# Patient Record
Sex: FEMALE | Race: ASIAN | NOT HISPANIC OR LATINO | Employment: OTHER | URBAN - METROPOLITAN AREA
[De-identification: names, ages, dates, MRNs, and addresses within clinical notes are randomized per-mention and may not be internally consistent; named-entity substitution may affect disease eponyms.]

---

## 2017-05-11 ENCOUNTER — HOME CARE/HOSPICE - HEALTHEAST (OUTPATIENT)
Dept: HOME HEALTH SERVICES | Facility: HOME HEALTH | Age: 72
End: 2017-05-11

## 2017-05-31 ENCOUNTER — RECORDS - HEALTHEAST (OUTPATIENT)
Dept: ADMINISTRATIVE | Facility: OTHER | Age: 72
End: 2017-05-31

## 2019-03-24 ENCOUNTER — RECORDS - HEALTHEAST (OUTPATIENT)
Dept: ADMINISTRATIVE | Facility: OTHER | Age: 74
End: 2019-03-24

## 2019-03-24 ASSESSMENT — MIFFLIN-ST. JEOR
SCORE: 1384.03
SCORE: 1384.03

## 2019-03-25 ENCOUNTER — RECORDS - HEALTHEAST (OUTPATIENT)
Dept: ADMINISTRATIVE | Facility: OTHER | Age: 74
End: 2019-03-25

## 2019-03-25 ENCOUNTER — AMBULATORY - HEALTHEAST (OUTPATIENT)
Dept: OTHER | Facility: CLINIC | Age: 74
End: 2019-03-25

## 2019-03-26 ENCOUNTER — AMBULATORY - HEALTHEAST (OUTPATIENT)
Dept: OTHER | Facility: CLINIC | Age: 74
End: 2019-03-26

## 2019-03-26 ENCOUNTER — ANESTHESIA - HEALTHEAST (OUTPATIENT)
Dept: SURGERY | Facility: HOSPITAL | Age: 74
End: 2019-03-26

## 2019-03-27 ENCOUNTER — OFFICE VISIT - HEALTHEAST (OUTPATIENT)
Dept: INTERPRETER SERVICES | Facility: CLINIC | Age: 74
End: 2019-03-27

## 2019-03-27 ENCOUNTER — SURGERY - HEALTHEAST (OUTPATIENT)
Dept: SURGERY | Facility: HOSPITAL | Age: 74
End: 2019-03-27

## 2019-03-29 ENCOUNTER — ANESTHESIA - HEALTHEAST (OUTPATIENT)
Dept: SURGERY | Facility: HOSPITAL | Age: 74
End: 2019-03-29

## 2019-03-29 ENCOUNTER — SURGERY - HEALTHEAST (OUTPATIENT)
Dept: SURGERY | Facility: HOSPITAL | Age: 74
End: 2019-03-29

## 2019-04-01 ENCOUNTER — COMMUNICATION - HEALTHEAST (OUTPATIENT)
Dept: NEUROLOGY | Facility: CLINIC | Age: 74
End: 2019-04-01

## 2019-04-01 ENCOUNTER — AMBULATORY - HEALTHEAST (OUTPATIENT)
Dept: OTHER | Facility: CLINIC | Age: 74
End: 2019-04-01

## 2019-04-02 ENCOUNTER — COMMUNICATION - HEALTHEAST (OUTPATIENT)
Dept: NEUROSURGERY | Facility: CLINIC | Age: 74
End: 2019-04-02

## 2019-04-02 DIAGNOSIS — S32.010A COMPRESSION FRACTURE OF L1 LUMBAR VERTEBRA (H): ICD-10-CM

## 2019-04-03 ENCOUNTER — COMMUNICATION - HEALTHEAST (OUTPATIENT)
Dept: NEUROSURGERY | Facility: CLINIC | Age: 74
End: 2019-04-03

## 2019-04-03 ENCOUNTER — AMBULATORY - HEALTHEAST (OUTPATIENT)
Dept: OTHER | Facility: CLINIC | Age: 74
End: 2019-04-03

## 2019-04-03 ENCOUNTER — OFFICE VISIT - HEALTHEAST (OUTPATIENT)
Dept: GERIATRICS | Facility: CLINIC | Age: 74
End: 2019-04-03

## 2019-04-03 DIAGNOSIS — Z98.890 S/P LAMINECTOMY: ICD-10-CM

## 2019-04-03 DIAGNOSIS — Z79.4 TYPE 2 DIABETES MELLITUS WITH HYPERGLYCEMIA, WITH LONG-TERM CURRENT USE OF INSULIN (H): ICD-10-CM

## 2019-04-03 DIAGNOSIS — S32.030S CLOSED COMPRESSION FRACTURE OF THIRD LUMBAR VERTEBRA, SEQUELA: ICD-10-CM

## 2019-04-03 DIAGNOSIS — R52 PAIN MANAGEMENT: ICD-10-CM

## 2019-04-03 DIAGNOSIS — E11.65 TYPE 2 DIABETES MELLITUS WITH HYPERGLYCEMIA, WITH LONG-TERM CURRENT USE OF INSULIN (H): ICD-10-CM

## 2019-04-04 ENCOUNTER — RECORDS - HEALTHEAST (OUTPATIENT)
Dept: LAB | Facility: CLINIC | Age: 74
End: 2019-04-04

## 2019-04-04 LAB
ALBUMIN UR-MCNC: ABNORMAL MG/DL
APPEARANCE UR: ABNORMAL
BACTERIA #/AREA URNS HPF: ABNORMAL HPF
BILIRUB UR QL STRIP: NEGATIVE
COLOR UR AUTO: YELLOW
GLUCOSE UR STRIP-MCNC: NEGATIVE MG/DL
HGB UR QL STRIP: ABNORMAL
KETONES UR STRIP-MCNC: NEGATIVE MG/DL
LEUKOCYTE ESTERASE UR QL STRIP: ABNORMAL
MUCOUS THREADS #/AREA URNS LPF: ABNORMAL LPF
NITRATE UR QL: POSITIVE
PH UR STRIP: 6.5 [PH] (ref 4.5–8)
RBC #/AREA URNS AUTO: ABNORMAL HPF
SP GR UR STRIP: 1.02 (ref 1–1.03)
SQUAMOUS #/AREA URNS AUTO: ABNORMAL LPF
UROBILINOGEN UR STRIP-ACNC: ABNORMAL
WBC #/AREA URNS AUTO: >100 HPF

## 2019-04-05 ENCOUNTER — OFFICE VISIT - HEALTHEAST (OUTPATIENT)
Dept: GERIATRICS | Facility: CLINIC | Age: 74
End: 2019-04-05

## 2019-04-05 DIAGNOSIS — Z79.4 TYPE 2 DIABETES MELLITUS WITH HYPERGLYCEMIA, WITH LONG-TERM CURRENT USE OF INSULIN (H): ICD-10-CM

## 2019-04-05 DIAGNOSIS — E11.65 TYPE 2 DIABETES MELLITUS WITH HYPERGLYCEMIA, WITH LONG-TERM CURRENT USE OF INSULIN (H): ICD-10-CM

## 2019-04-05 DIAGNOSIS — R52 PAIN MANAGEMENT: ICD-10-CM

## 2019-04-05 DIAGNOSIS — Z98.890 S/P LAMINECTOMY: ICD-10-CM

## 2019-04-05 DIAGNOSIS — S32.030S CLOSED COMPRESSION FRACTURE OF THIRD LUMBAR VERTEBRA, SEQUELA: ICD-10-CM

## 2019-04-05 DIAGNOSIS — N30.00 ACUTE CYSTITIS WITHOUT HEMATURIA: ICD-10-CM

## 2019-04-05 DIAGNOSIS — D64.9 ANEMIA, UNSPECIFIED TYPE: ICD-10-CM

## 2019-04-05 LAB — HGB BLD-MCNC: 8.8 G/DL (ref 12–16)

## 2019-04-06 LAB
BACTERIA SPEC CULT: ABNORMAL
BACTERIA SPEC CULT: ABNORMAL

## 2019-04-08 ENCOUNTER — OFFICE VISIT - HEALTHEAST (OUTPATIENT)
Dept: GERIATRICS | Facility: CLINIC | Age: 74
End: 2019-04-08

## 2019-04-08 DIAGNOSIS — N30.00 ACUTE CYSTITIS WITHOUT HEMATURIA: ICD-10-CM

## 2019-04-08 DIAGNOSIS — Z79.4 TYPE 2 DIABETES MELLITUS WITH HYPERGLYCEMIA, WITH LONG-TERM CURRENT USE OF INSULIN (H): ICD-10-CM

## 2019-04-08 DIAGNOSIS — E11.65 TYPE 2 DIABETES MELLITUS WITH HYPERGLYCEMIA, WITH LONG-TERM CURRENT USE OF INSULIN (H): ICD-10-CM

## 2019-04-08 DIAGNOSIS — Z98.890 S/P LAMINECTOMY: ICD-10-CM

## 2019-04-08 DIAGNOSIS — D62 ACUTE BLOOD LOSS ANEMIA: ICD-10-CM

## 2019-04-08 DIAGNOSIS — M80.00XD AGE-RELATED OSTEOPOROSIS WITH CURRENT PATHOLOGICAL FRACTURE WITH ROUTINE HEALING, SUBSEQUENT ENCOUNTER: ICD-10-CM

## 2019-04-09 ENCOUNTER — RECORDS - HEALTHEAST (OUTPATIENT)
Dept: LAB | Facility: CLINIC | Age: 74
End: 2019-04-09

## 2019-04-09 LAB
BASOPHILS # BLD AUTO: 0 THOU/UL (ref 0–0.2)
BASOPHILS NFR BLD AUTO: 0 % (ref 0–2)
EOSINOPHIL # BLD AUTO: 0.1 THOU/UL (ref 0–0.4)
EOSINOPHIL NFR BLD AUTO: 1 % (ref 0–6)
ERYTHROCYTE [DISTWIDTH] IN BLOOD BY AUTOMATED COUNT: 14.1 % (ref 11–14.5)
FERRITIN SERPL-MCNC: 267 NG/ML (ref 10–130)
HCT VFR BLD AUTO: 33.3 % (ref 35–47)
HGB BLD-MCNC: 10.7 G/DL (ref 12–16)
IRON SERPL-MCNC: 47 UG/DL (ref 42–175)
LYMPHOCYTES # BLD AUTO: 1.9 THOU/UL (ref 0.8–4.4)
LYMPHOCYTES NFR BLD AUTO: 26 % (ref 20–40)
MCH RBC QN AUTO: 31.7 PG (ref 27–34)
MCHC RBC AUTO-ENTMCNC: 32.1 G/DL (ref 32–36)
MCV RBC AUTO: 99 FL (ref 80–100)
MONOCYTES # BLD AUTO: 0.5 THOU/UL (ref 0–0.9)
MONOCYTES NFR BLD AUTO: 7 % (ref 2–10)
NEUTROPHILS # BLD AUTO: 4.8 THOU/UL (ref 2–7.7)
NEUTROPHILS NFR BLD AUTO: 66 % (ref 50–70)
PLATELET # BLD AUTO: 333 THOU/UL (ref 140–440)
PMV BLD AUTO: 8.7 FL (ref 8.5–12.5)
RBC # BLD AUTO: 3.38 MILL/UL (ref 3.8–5.4)
TSH SERPL DL<=0.005 MIU/L-ACNC: 0.26 UIU/ML (ref 0.3–5)
WBC: 7.3 THOU/UL (ref 4–11)

## 2019-04-10 ENCOUNTER — OFFICE VISIT - HEALTHEAST (OUTPATIENT)
Dept: GERIATRICS | Facility: CLINIC | Age: 74
End: 2019-04-10

## 2019-04-10 DIAGNOSIS — S32.030S CLOSED COMPRESSION FRACTURE OF THIRD LUMBAR VERTEBRA, SEQUELA: ICD-10-CM

## 2019-04-10 DIAGNOSIS — Z79.4 TYPE 2 DIABETES MELLITUS WITH HYPERGLYCEMIA, WITH LONG-TERM CURRENT USE OF INSULIN (H): ICD-10-CM

## 2019-04-10 DIAGNOSIS — Z98.890 S/P LAMINECTOMY: ICD-10-CM

## 2019-04-10 DIAGNOSIS — N30.00 ACUTE CYSTITIS WITHOUT HEMATURIA: ICD-10-CM

## 2019-04-10 DIAGNOSIS — R52 PAIN MANAGEMENT: ICD-10-CM

## 2019-04-10 DIAGNOSIS — E11.65 TYPE 2 DIABETES MELLITUS WITH HYPERGLYCEMIA, WITH LONG-TERM CURRENT USE OF INSULIN (H): ICD-10-CM

## 2019-04-12 ENCOUNTER — OFFICE VISIT - HEALTHEAST (OUTPATIENT)
Dept: GERIATRICS | Facility: CLINIC | Age: 74
End: 2019-04-12

## 2019-04-12 DIAGNOSIS — S32.030S CLOSED COMPRESSION FRACTURE OF THIRD LUMBAR VERTEBRA, SEQUELA: ICD-10-CM

## 2019-04-12 DIAGNOSIS — Z79.4 TYPE 2 DIABETES MELLITUS WITH HYPERGLYCEMIA, WITH LONG-TERM CURRENT USE OF INSULIN (H): ICD-10-CM

## 2019-04-12 DIAGNOSIS — M21.371 BILATERAL FOOT-DROP: ICD-10-CM

## 2019-04-12 DIAGNOSIS — Z98.890 S/P LAMINECTOMY: ICD-10-CM

## 2019-04-12 DIAGNOSIS — R52 PAIN MANAGEMENT: ICD-10-CM

## 2019-04-12 DIAGNOSIS — M21.372 BILATERAL FOOT-DROP: ICD-10-CM

## 2019-04-12 DIAGNOSIS — E11.65 TYPE 2 DIABETES MELLITUS WITH HYPERGLYCEMIA, WITH LONG-TERM CURRENT USE OF INSULIN (H): ICD-10-CM

## 2019-04-15 ENCOUNTER — AMBULATORY - HEALTHEAST (OUTPATIENT)
Dept: NEUROSURGERY | Facility: CLINIC | Age: 74
End: 2019-04-15

## 2019-04-16 ENCOUNTER — OFFICE VISIT - HEALTHEAST (OUTPATIENT)
Dept: GERIATRICS | Facility: CLINIC | Age: 74
End: 2019-04-16

## 2019-04-16 DIAGNOSIS — S32.030S CLOSED COMPRESSION FRACTURE OF THIRD LUMBAR VERTEBRA, SEQUELA: ICD-10-CM

## 2019-04-16 DIAGNOSIS — Z79.4 TYPE 2 DIABETES MELLITUS WITH HYPERGLYCEMIA, WITH LONG-TERM CURRENT USE OF INSULIN (H): ICD-10-CM

## 2019-04-16 DIAGNOSIS — E11.65 TYPE 2 DIABETES MELLITUS WITH HYPERGLYCEMIA, WITH LONG-TERM CURRENT USE OF INSULIN (H): ICD-10-CM

## 2019-04-16 DIAGNOSIS — R52 PAIN MANAGEMENT: ICD-10-CM

## 2019-04-16 DIAGNOSIS — Z98.890 S/P LAMINECTOMY: ICD-10-CM

## 2019-04-17 ENCOUNTER — RECORDS - HEALTHEAST (OUTPATIENT)
Dept: LAB | Facility: CLINIC | Age: 74
End: 2019-04-17

## 2019-04-17 LAB
25(OH)D3 SERPL-MCNC: 23.4 NG/ML (ref 30–80)
T4 FREE SERPL-MCNC: 1.3 NG/DL (ref 0.7–1.8)
TSH SERPL DL<=0.005 MIU/L-ACNC: 0.44 UIU/ML (ref 0.3–5)

## 2019-04-19 ENCOUNTER — OFFICE VISIT - HEALTHEAST (OUTPATIENT)
Dept: NEUROSURGERY | Facility: CLINIC | Age: 74
End: 2019-04-19

## 2019-04-19 ENCOUNTER — HOSPITAL ENCOUNTER (OUTPATIENT)
Dept: RADIOLOGY | Facility: CLINIC | Age: 74
Discharge: HOME | End: 2019-04-19
Attending: SURGERY

## 2019-04-19 ENCOUNTER — AMBULATORY - HEALTHEAST (OUTPATIENT)
Dept: NEUROSURGERY | Facility: CLINIC | Age: 74
End: 2019-04-19

## 2019-04-19 DIAGNOSIS — S32.010G CLOSED COMPRESSION FRACTURE OF L1 LUMBAR VERTEBRA WITH DELAYED HEALING, SUBSEQUENT ENCOUNTER: ICD-10-CM

## 2019-04-19 DIAGNOSIS — S32.010A COMPRESSION FRACTURE OF L1 LUMBAR VERTEBRA (H): ICD-10-CM

## 2019-04-19 ASSESSMENT — MIFFLIN-ST. JEOR: SCORE: 1346.83

## 2019-04-22 ENCOUNTER — OFFICE VISIT - HEALTHEAST (OUTPATIENT)
Dept: GERIATRICS | Facility: CLINIC | Age: 74
End: 2019-04-22

## 2019-04-22 DIAGNOSIS — Z98.890 S/P LAMINECTOMY: ICD-10-CM

## 2019-04-22 DIAGNOSIS — E55.9 VITAMIN D DEFICIENCY: ICD-10-CM

## 2019-04-22 DIAGNOSIS — R52 PAIN MANAGEMENT: ICD-10-CM

## 2019-04-22 DIAGNOSIS — S32.030S CLOSED COMPRESSION FRACTURE OF THIRD LUMBAR VERTEBRA, SEQUELA: ICD-10-CM

## 2019-04-22 DIAGNOSIS — E11.65 TYPE 2 DIABETES MELLITUS WITH HYPERGLYCEMIA, WITH LONG-TERM CURRENT USE OF INSULIN (H): ICD-10-CM

## 2019-04-22 DIAGNOSIS — Z79.4 TYPE 2 DIABETES MELLITUS WITH HYPERGLYCEMIA, WITH LONG-TERM CURRENT USE OF INSULIN (H): ICD-10-CM

## 2019-04-25 ENCOUNTER — OFFICE VISIT - HEALTHEAST (OUTPATIENT)
Dept: GERIATRICS | Facility: CLINIC | Age: 74
End: 2019-04-25

## 2019-04-25 DIAGNOSIS — E11.65 TYPE 2 DIABETES MELLITUS WITH HYPERGLYCEMIA, WITH LONG-TERM CURRENT USE OF INSULIN (H): ICD-10-CM

## 2019-04-25 DIAGNOSIS — S32.030S CLOSED COMPRESSION FRACTURE OF THIRD LUMBAR VERTEBRA, SEQUELA: ICD-10-CM

## 2019-04-25 DIAGNOSIS — Z98.890 S/P LAMINECTOMY: ICD-10-CM

## 2019-04-25 DIAGNOSIS — Z79.4 TYPE 2 DIABETES MELLITUS WITH HYPERGLYCEMIA, WITH LONG-TERM CURRENT USE OF INSULIN (H): ICD-10-CM

## 2019-04-25 DIAGNOSIS — R52 PAIN MANAGEMENT: ICD-10-CM

## 2019-04-25 RX ORDER — ACETAMINOPHEN 325 MG/1
650 TABLET ORAL EVERY 6 HOURS PRN
Status: SHIPPED | COMMUNITY
Start: 2019-04-25 | End: 2023-08-06

## 2019-04-29 ENCOUNTER — RECORDS - HEALTHEAST (OUTPATIENT)
Dept: LAB | Facility: CLINIC | Age: 74
End: 2019-04-29

## 2019-04-29 LAB
ALBUMIN UR-MCNC: ABNORMAL MG/DL
APPEARANCE UR: ABNORMAL
BACTERIA #/AREA URNS HPF: ABNORMAL HPF
BILIRUB UR QL STRIP: NEGATIVE
COLOR UR AUTO: YELLOW
GLUCOSE UR STRIP-MCNC: NEGATIVE MG/DL
HGB UR QL STRIP: NEGATIVE
KETONES UR STRIP-MCNC: NEGATIVE MG/DL
LEUKOCYTE ESTERASE UR QL STRIP: ABNORMAL
MUCOUS THREADS #/AREA URNS LPF: ABNORMAL LPF
NITRATE UR QL: POSITIVE
PH UR STRIP: 6.5 [PH] (ref 4.5–8)
RBC #/AREA URNS AUTO: ABNORMAL HPF
SP GR UR STRIP: 1.02 (ref 1–1.03)
SQUAMOUS #/AREA URNS AUTO: ABNORMAL LPF
UROBILINOGEN UR STRIP-ACNC: ABNORMAL
WBC #/AREA URNS AUTO: ABNORMAL HPF

## 2019-04-30 ENCOUNTER — OFFICE VISIT - HEALTHEAST (OUTPATIENT)
Dept: GERIATRICS | Facility: CLINIC | Age: 74
End: 2019-04-30

## 2019-04-30 DIAGNOSIS — Z79.4 TYPE 2 DIABETES MELLITUS WITH HYPERGLYCEMIA, WITH LONG-TERM CURRENT USE OF INSULIN (H): ICD-10-CM

## 2019-04-30 DIAGNOSIS — N30.00 ACUTE CYSTITIS WITHOUT HEMATURIA: ICD-10-CM

## 2019-04-30 DIAGNOSIS — Z98.890 S/P LAMINECTOMY: ICD-10-CM

## 2019-04-30 DIAGNOSIS — E11.65 TYPE 2 DIABETES MELLITUS WITH HYPERGLYCEMIA, WITH LONG-TERM CURRENT USE OF INSULIN (H): ICD-10-CM

## 2019-04-30 DIAGNOSIS — R52 PAIN MANAGEMENT: ICD-10-CM

## 2019-04-30 DIAGNOSIS — S32.030S CLOSED COMPRESSION FRACTURE OF THIRD LUMBAR VERTEBRA, SEQUELA: ICD-10-CM

## 2019-05-02 ENCOUNTER — OFFICE VISIT - HEALTHEAST (OUTPATIENT)
Dept: GERIATRICS | Facility: CLINIC | Age: 74
End: 2019-05-02

## 2019-05-02 DIAGNOSIS — S32.030S CLOSED COMPRESSION FRACTURE OF THIRD LUMBAR VERTEBRA, SEQUELA: ICD-10-CM

## 2019-05-02 DIAGNOSIS — Z98.890 S/P LAMINECTOMY: ICD-10-CM

## 2019-05-02 DIAGNOSIS — N30.00 ACUTE CYSTITIS WITHOUT HEMATURIA: ICD-10-CM

## 2019-05-02 DIAGNOSIS — R52 PAIN MANAGEMENT: ICD-10-CM

## 2019-05-02 LAB
BACTERIA SPEC CULT: ABNORMAL
BACTERIA SPEC CULT: ABNORMAL

## 2019-05-06 ENCOUNTER — OFFICE VISIT - HEALTHEAST (OUTPATIENT)
Dept: GERIATRICS | Facility: CLINIC | Age: 74
End: 2019-05-06

## 2019-05-06 DIAGNOSIS — Z98.890 S/P LAMINECTOMY: ICD-10-CM

## 2019-05-06 DIAGNOSIS — S32.030S CLOSED COMPRESSION FRACTURE OF THIRD LUMBAR VERTEBRA, SEQUELA: ICD-10-CM

## 2019-05-06 DIAGNOSIS — N30.00 ACUTE CYSTITIS WITHOUT HEMATURIA: ICD-10-CM

## 2019-05-06 DIAGNOSIS — Z79.4 TYPE 2 DIABETES MELLITUS WITH HYPERGLYCEMIA, WITH LONG-TERM CURRENT USE OF INSULIN (H): ICD-10-CM

## 2019-05-06 DIAGNOSIS — E11.65 TYPE 2 DIABETES MELLITUS WITH HYPERGLYCEMIA, WITH LONG-TERM CURRENT USE OF INSULIN (H): ICD-10-CM

## 2019-05-09 ENCOUNTER — OFFICE VISIT - HEALTHEAST (OUTPATIENT)
Dept: GERIATRICS | Facility: CLINIC | Age: 74
End: 2019-05-09

## 2019-05-09 DIAGNOSIS — S32.030S CLOSED COMPRESSION FRACTURE OF THIRD LUMBAR VERTEBRA, SEQUELA: ICD-10-CM

## 2019-05-09 DIAGNOSIS — R29.6 FALLS FREQUENTLY: ICD-10-CM

## 2019-05-09 DIAGNOSIS — M21.371 BILATERAL FOOT-DROP: ICD-10-CM

## 2019-05-09 DIAGNOSIS — E11.65 TYPE 2 DIABETES MELLITUS WITH HYPERGLYCEMIA, WITH LONG-TERM CURRENT USE OF INSULIN (H): ICD-10-CM

## 2019-05-09 DIAGNOSIS — Z79.4 TYPE 2 DIABETES MELLITUS WITH HYPERGLYCEMIA, WITH LONG-TERM CURRENT USE OF INSULIN (H): ICD-10-CM

## 2019-05-09 DIAGNOSIS — Z98.890 S/P LAMINECTOMY: ICD-10-CM

## 2019-05-09 DIAGNOSIS — M21.372 BILATERAL FOOT-DROP: ICD-10-CM

## 2019-05-13 ENCOUNTER — OFFICE VISIT - HEALTHEAST (OUTPATIENT)
Dept: NEUROSURGERY | Facility: CLINIC | Age: 74
End: 2019-05-13

## 2019-05-13 ENCOUNTER — HOSPITAL ENCOUNTER (OUTPATIENT)
Dept: RADIOLOGY | Facility: CLINIC | Age: 74
Discharge: HOME | End: 2019-05-13

## 2019-05-13 DIAGNOSIS — S32.010G CLOSED COMPRESSION FRACTURE OF L1 LUMBAR VERTEBRA WITH DELAYED HEALING, SUBSEQUENT ENCOUNTER: ICD-10-CM

## 2019-05-13 RX ORDER — AMOXICILLIN 250 MG
1 CAPSULE ORAL DAILY
Status: SHIPPED | COMMUNITY
Start: 2019-05-13 | End: 2023-08-06

## 2019-05-13 RX ORDER — POLYETHYLENE GLYCOL 3350 17 G/17G
17 POWDER, FOR SOLUTION ORAL DAILY
Status: SHIPPED | COMMUNITY
Start: 2019-05-13 | End: 2023-08-06

## 2019-05-13 ASSESSMENT — MIFFLIN-ST. JEOR: SCORE: 1346.83

## 2019-05-14 ENCOUNTER — OFFICE VISIT - HEALTHEAST (OUTPATIENT)
Dept: GERIATRICS | Facility: CLINIC | Age: 74
End: 2019-05-14

## 2019-05-14 DIAGNOSIS — M80.00XD AGE-RELATED OSTEOPOROSIS WITH CURRENT PATHOLOGICAL FRACTURE WITH ROUTINE HEALING, SUBSEQUENT ENCOUNTER: ICD-10-CM

## 2019-05-14 DIAGNOSIS — Z98.890 S/P LAMINECTOMY: ICD-10-CM

## 2019-05-14 DIAGNOSIS — S32.030S CLOSED COMPRESSION FRACTURE OF THIRD LUMBAR VERTEBRA, SEQUELA: ICD-10-CM

## 2019-05-14 DIAGNOSIS — E11.65 TYPE 2 DIABETES MELLITUS WITH HYPERGLYCEMIA, WITH LONG-TERM CURRENT USE OF INSULIN (H): ICD-10-CM

## 2019-05-14 DIAGNOSIS — Z79.4 TYPE 2 DIABETES MELLITUS WITH HYPERGLYCEMIA, WITH LONG-TERM CURRENT USE OF INSULIN (H): ICD-10-CM

## 2019-05-14 DIAGNOSIS — M21.372 BILATERAL FOOT-DROP: ICD-10-CM

## 2019-05-14 DIAGNOSIS — M21.371 BILATERAL FOOT-DROP: ICD-10-CM

## 2019-05-14 RX ORDER — ALENDRONATE SODIUM 10 MG/1
70 TABLET ORAL WEEKLY
Status: SHIPPED | COMMUNITY
Start: 2019-05-14 | End: 2023-08-06

## 2019-05-16 ENCOUNTER — RECORDS - HEALTHEAST (OUTPATIENT)
Dept: LAB | Facility: CLINIC | Age: 74
End: 2019-05-16

## 2019-05-16 LAB — 25(OH)D3 SERPL-MCNC: 34.4 NG/ML (ref 30–80)

## 2019-05-20 ENCOUNTER — OFFICE VISIT - HEALTHEAST (OUTPATIENT)
Dept: GERIATRICS | Facility: CLINIC | Age: 74
End: 2019-05-20

## 2019-05-20 DIAGNOSIS — Z98.890 S/P LAMINECTOMY: ICD-10-CM

## 2019-05-20 DIAGNOSIS — M80.00XD AGE-RELATED OSTEOPOROSIS WITH CURRENT PATHOLOGICAL FRACTURE WITH ROUTINE HEALING, SUBSEQUENT ENCOUNTER: ICD-10-CM

## 2019-05-20 DIAGNOSIS — E11.65 TYPE 2 DIABETES MELLITUS WITH HYPERGLYCEMIA, WITH LONG-TERM CURRENT USE OF INSULIN (H): ICD-10-CM

## 2019-05-20 DIAGNOSIS — S32.030S CLOSED COMPRESSION FRACTURE OF THIRD LUMBAR VERTEBRA, SEQUELA: ICD-10-CM

## 2019-05-20 DIAGNOSIS — E55.9 VITAMIN D DEFICIENCY: ICD-10-CM

## 2019-05-20 DIAGNOSIS — Z79.4 TYPE 2 DIABETES MELLITUS WITH HYPERGLYCEMIA, WITH LONG-TERM CURRENT USE OF INSULIN (H): ICD-10-CM

## 2019-05-20 RX ORDER — CALCIUM CARBONATE 500 MG/1
2 TABLET, CHEWABLE ORAL DAILY
Status: SHIPPED | COMMUNITY
Start: 2019-05-20 | End: 2023-08-06

## 2019-05-20 RX ORDER — INSULIN GLARGINE 100 [IU]/ML
34 INJECTION, SOLUTION SUBCUTANEOUS AT BEDTIME
Status: SHIPPED | COMMUNITY
Start: 2019-05-20 | End: 2023-08-06

## 2019-05-28 ENCOUNTER — OFFICE VISIT - HEALTHEAST (OUTPATIENT)
Dept: GERIATRICS | Facility: CLINIC | Age: 74
End: 2019-05-28

## 2019-05-28 DIAGNOSIS — R63.4 WEIGHT LOSS: ICD-10-CM

## 2019-05-28 DIAGNOSIS — M80.00XD AGE-RELATED OSTEOPOROSIS WITH CURRENT PATHOLOGICAL FRACTURE WITH ROUTINE HEALING, SUBSEQUENT ENCOUNTER: ICD-10-CM

## 2019-05-28 DIAGNOSIS — Z79.4 TYPE 2 DIABETES MELLITUS WITH HYPERGLYCEMIA, WITH LONG-TERM CURRENT USE OF INSULIN (H): ICD-10-CM

## 2019-05-28 DIAGNOSIS — Z98.890 S/P LAMINECTOMY: ICD-10-CM

## 2019-05-28 DIAGNOSIS — E11.65 TYPE 2 DIABETES MELLITUS WITH HYPERGLYCEMIA, WITH LONG-TERM CURRENT USE OF INSULIN (H): ICD-10-CM

## 2019-05-28 DIAGNOSIS — S32.030S CLOSED COMPRESSION FRACTURE OF THIRD LUMBAR VERTEBRA, SEQUELA: ICD-10-CM

## 2019-06-03 ENCOUNTER — OFFICE VISIT - HEALTHEAST (OUTPATIENT)
Dept: GERIATRICS | Facility: CLINIC | Age: 74
End: 2019-06-03

## 2019-06-03 DIAGNOSIS — S32.030S CLOSED COMPRESSION FRACTURE OF THIRD LUMBAR VERTEBRA, SEQUELA: ICD-10-CM

## 2019-06-03 DIAGNOSIS — M80.00XD AGE-RELATED OSTEOPOROSIS WITH CURRENT PATHOLOGICAL FRACTURE WITH ROUTINE HEALING, SUBSEQUENT ENCOUNTER: ICD-10-CM

## 2019-06-03 DIAGNOSIS — Z98.890 S/P LAMINECTOMY: ICD-10-CM

## 2019-06-03 DIAGNOSIS — Z79.4 TYPE 2 DIABETES MELLITUS WITH HYPERGLYCEMIA, WITH LONG-TERM CURRENT USE OF INSULIN (H): ICD-10-CM

## 2019-06-03 DIAGNOSIS — M21.372 BILATERAL FOOT-DROP: ICD-10-CM

## 2019-06-03 DIAGNOSIS — E55.9 VITAMIN D DEFICIENCY: ICD-10-CM

## 2019-06-03 DIAGNOSIS — R29.6 FALLS FREQUENTLY: ICD-10-CM

## 2019-06-03 DIAGNOSIS — E11.65 TYPE 2 DIABETES MELLITUS WITH HYPERGLYCEMIA, WITH LONG-TERM CURRENT USE OF INSULIN (H): ICD-10-CM

## 2019-06-03 DIAGNOSIS — M21.371 BILATERAL FOOT-DROP: ICD-10-CM

## 2019-06-06 ENCOUNTER — AMBULATORY - HEALTHEAST (OUTPATIENT)
Dept: GERIATRICS | Facility: CLINIC | Age: 74
End: 2019-06-06

## 2019-06-20 ENCOUNTER — COMMUNICATION - HEALTHEAST (OUTPATIENT)
Dept: NEUROSURGERY | Facility: CLINIC | Age: 74
End: 2019-06-20

## 2019-06-20 ENCOUNTER — HOSPITAL ENCOUNTER (OUTPATIENT)
Dept: RADIOLOGY | Facility: HOSPITAL | Age: 74
Discharge: HOME | End: 2019-06-20

## 2019-06-20 DIAGNOSIS — S32.010G CLOSED COMPRESSION FRACTURE OF L1 LUMBAR VERTEBRA WITH DELAYED HEALING, SUBSEQUENT ENCOUNTER: ICD-10-CM

## 2019-07-01 ENCOUNTER — OFFICE VISIT - HEALTHEAST (OUTPATIENT)
Dept: NEUROSURGERY | Facility: CLINIC | Age: 74
End: 2019-07-01

## 2019-07-01 DIAGNOSIS — S32.010G CLOSED COMPRESSION FRACTURE OF L1 LUMBAR VERTEBRA WITH DELAYED HEALING, SUBSEQUENT ENCOUNTER: ICD-10-CM

## 2019-07-01 ASSESSMENT — MIFFLIN-ST. JEOR: SCORE: 1267.46

## 2021-05-27 NOTE — ANESTHESIA POSTPROCEDURE EVALUATION
Patient: Trinidad Brown  Lumbar 3- Lumbar-4, Lumbar 4-Lumbar -5, Lumbar 5-Sacral 1,  decompressive laminectomy.  Anesthesia type: general    Patient location: PACU  Last vitals:   Vitals:    03/29/19 2230   BP: 117/59   Pulse: 93   Resp: 18   Temp: 36.4  C (97.6  F)   SpO2: 98%   Note is a late entry owing to clinical demands  Post vital signs: stable  Level of consciousness: awake and responds to questions  Post-anesthesia pain: pain controlled  Post-anesthesia nausea and vomiting: no  Pulmonary: supplemental oxygen  Cardiovascular: stable and blood pressure at baseline  Hydration: adequate  Anesthetic events: no    QCDR Measures:  ASA# 11 - Jenna-op Cardiac Arrest: ASA11B - Patient did NOT experience unanticipated cardiac arrest  ASA# 12 - Jenna-op Mortality Rate: ASA12B - Patient did NOT die  ASA# 13 - PACU Re-Intubation Rate: ASA13B - Patient did NOT require a new airway mgmt  ASA# 10 - Composite Anes Safety: ASA10A - No serious adverse event    Additional Notes:

## 2021-05-27 NOTE — TELEPHONE ENCOUNTER
Teri called today on behalf of Trinidad Brown. She stated they are having trouble with the TLSO brace fitting correctly. The brace seems to be slipping and sliding around. They placed Trinidad in a soft brace in order to get her up and moving but need a better plan to proceed with PT and OT. Please call Teri at [phone].

## 2021-05-27 NOTE — ANESTHESIA PROCEDURE NOTES
Arterial Line  Reason for Procedure: hemodynamic monitoring and multiple ABGs  Patient location during procedure: Pre-op  Start time: 3/29/2019 11:09 AM  End time: 3/29/2019 11:20 AM  Staffing:  Performing  Anesthesiologist: Jess Mota MD  Performing CRNA: Kulwant Quick CRNA  Sterile Precautions:  sterile barriers used during insertion: cap, mask, sterile gloves, large sheet, and hand hygiene used.  Arterial Line:   Immediately prior to procedure a time out was called to verify the correct patient, procedure, equipment, support staff and site/side marked as required  Laterality: right  Location: brachial  Prepped with: ChloroPrep    Needle gauge: 20 G  Number of Attempts: 1  Secured with: tape, transparent dressing and pressure dressing  Flushed with: saline  1% lidocaine local anesthesia used for skin prep.   See MAR for additional medications given.  Ultrasound evaluation of access site: yes  Vessel patent by US exam    Concurrent real time visualization of needle entry    Additional Notes:  Patient tolerated well and without complication

## 2021-05-27 NOTE — PATIENT INSTRUCTIONS - HE
WOUND CARE:  Keep a dressing on the wound until the staples are removed.  No lotions, soaps, powders, ointments, creams, or patches on incision until the wound is well healed.    Change the dressing daily, more frequently if necessary to keep the wound dry.  If you notice increased or persistent drainage from your wound, contact our office.  You may use an extra large band-aid or 4x4 and tape.  Both can be purchased at your pharmacy.    Staples are usually removed in 1-2 weeks.  They are sometimes left longer.    Wash your hands before changing the dressing or touching the wound. If someone is helping you change the dressing, ensure that the person washes his/her hands.  Good handwashing can decrease the risk of infection.  If you are unable to see the wound, have someone check the wound daily for redness, swelling or drainage.  A small amount of drainage is normal.       Two days after surgery begin showering. Wash your wound daily. Remove all dressings prior to your shower.  Apply mild soap directly to the wound, form a light lather and rinse with running water.     Do not submerge the wound under water. No baths or swimming for 6 weeks.     If you develop redness, swelling, drainage, or temp 101 or greater, please call our office at [phone].        * No lifting, pushing or pulling greater than 5-10 pound (this is about a gallon of milk).  *No repetitive bending, twisting, or jarring activities  *No overhead work  *No aerobic or strenuous activity  *No activities with increased risk of falls  *You may move about your home as tolerated  *You may walk up and down stairs as tolerated  *You may increase your activity slowly over the next 4-6 weeks    WALKING PROGRAM: As you can tolerate, walk daily-start with 5-10 minutes of continuous walking. This is in addition to the walking that you do as part of your daily activities. Increase the time that you walk by 5 minutes every couple of days. Do not exceed 30-45  minutes of continuous walking until seen in follow-up. Walking is the best exercise after surgery.  **Listen to your body, if you find that you are more painful or fatigued, you may need to proceed more slowly.    **Do not smoke or expose yourself to second hand smoke. Cigarette smoke can delay healing and cause complications.

## 2021-05-27 NOTE — ANESTHESIA CARE TRANSFER NOTE
Last vitals:   Vitals:    03/29/19 1820   BP:    Pulse: 90   Resp: 14   Temp:    SpO2: 100%   See vitals from 1810 from PACU.  Patient's level of consciousness is drowsy  Spontaneous respirations: yes  Maintains airway independently: no-nasal airway in place.  Dentition unchanged: yes  Oropharynx: oropharynx clear of all foreign objects    QCDR Measures:  ASA# 20 - Surgical Safety Checklist: WHO surgical safety checklist completed prior to induction    PQRS# 430 - Adult PONV Prevention: 4558F - Pt received => 2 anti-emetic agents (different classes) preop & intraop  ASA# 8 - Peds PONV Prevention: 4558F-8P - Pt did NOT receive => 2 antiemetic agents  PQRS# 424 - Jenna-op Temp Management: 4559F - At least one body temp DOCUMENTED => 35.5C or 95.9F within required timeframe  PQRS# 426 - PACU Transfer Protocol: - Transfer of care checklist used  ASA# 14 - Acute Post-op Pain: ASA14B - Patient did NOT experience pain >= 7 out of 10

## 2021-05-27 NOTE — PROGRESS NOTES
[facility]        Visit Type: H & P (Acute back pain with fall)    Code Status:  DNR  Facility:  [facility] [[id number]]          PCP: Rohini Alvarez MD       PHONE: [phone]     FAX:[phone]        ASSESSMENT/PLAN:  1. S/P laminectomy   stable with no progression of the patient's lower extremity weakness at this time.  The patient's pain centered on her surgical site.  We have shifted her narcotics from oxycodone to Dilaudid with better control of her pain.  PT/OT   2. Type 2 diabetes mellitus with hyperglycemia, with long-term current use of insulin (H)   blood sugars are stable with a.m. blood sugars range , very low blood sugars in the evenings range 87-89.  Will discontinue glipizide and consider tapering patient's Lantus at at bedtime.  Continue Januvia and metformin for now.   3. Acute cystitis without hematuria   patient is currently on ciprofloxacin to complete another 5-day course, no evidence for worsening symptoms   4. Acute blood loss anemia   asymptomatic, will recheck CBC on 4/9, no evidence of bleeding   5. Age-related osteoporosis with current pathological fracture with routine healing, subsequent encounter   bone DEXA scan as outpatient, check TSH and vitamin D levels.  Consider Fosamax, versus Miacalcin nasal spray which might help with patient's lumbar pain from her fractured site         HISTORY OF PRESENT ILLNESS:   Trinidad Brown is a 74 y.o. female with a history of type 2 diabetes, spinal stenosis who was admitted after falling causing acute back pain and sustained a left spine compression fracture at L1 which is exacerbated by her severe spinal stenosis of the L-spine.  The patient underwent decompressive laminectomy on 3/29/19.  According to her family, she has had previous mechanical falls and has been unable to stand or ambulate for a few months, has had lower extremity weakness probably secondary to her severe spinal  stenosis.  Post surgery, the patient did well with good surgical site healing except that she suffered acute blood loss anemia with hemoglobin dropping to 8.7 from 14.9..  Hemoglobin on 4/5 is at 8.8.  She still complains of bilateral lower extremity weakness.  She was able to tolerate TLSO brace.  Her blood sugars were high with a hemoglobin A1c of 11.3 and blood sugars in the 400s on admission.  The patient was on glipizide, metformin, Januvia at home and in the hospital she was started on Lantus at 45 units at at bedtime.  She also had cognitive impairment and she failed a mini cognitive assessment with a slums of 0.  Unclear if this is also secondary to cultural barrier for this patient.    Currently, the patient states that she still has a lot of pain especially when she turns from side to side, pain mainly on her surgical site.  She still complains of weakness bilateral lower extremities associated tingling and refused to do her physical therapy because of her weakness on her legs.  She denies any dizziness or lightheadedness or syncopal episodes.  She does not have any fevers or chills, cough, abdominal pain, diarrhea or constipation.  She states that she is sleeping poorly due to her pain.  Her appetite has been good but she occasionally complains of some nausea and she admitted that she vomited small amounts of food and is not tolerating her food well.  Interview with the patient was done with .    Other review of systems are negative.      PAST MEDICAL/SURGICAL HISTORY:  Past Medical History:   Diagnosis Date     Squamous cell carcinoma of back      Type 2 diabetes mellitus with hyperglycemia (H) 12/9/2015     Past Surgical History:   Procedure Laterality Date     BACK SURGERY      tumor removal     LUMBAR LAMINECTOMY Bilateral 3/29/2019    Procedure: Lumbar 3- Lumbar-4, Lumbar 4-Lumbar -5, Lumbar 5-Sacral 1,  decompressive laminectomy.;  Surgeon: Naeem Thomas MD;  Location: [facility]  Main OR;  Service: Spine       SOCIAL HISTORY: Patient is  and lives with her daughter and son-in-law and their kids.  She states that previous to her problems with her weakness of the lower extremities she was independent and walk around home.  Slowly but this became worse until she was not able to ambulate on her own and had been prone to falls.      FAMILY HISTORY:  Family History   Problem Relation Age of Onset     Stroke Daughter      Hypertension Daughter      No Medical Problems Son        MEDICATIONS:  Current Outpatient Medications on File Prior to Visit   Medication Sig     atorvastatin (LIPITOR) 40 MG tablet Take 40 mg by mouth at bedtime.     calcium carbonate-vitamin D3 (CALCIUM WITH VITAMIN D) 600 mg(1,500mg) -400 unit per tablet Take 1 tablet by mouth 2 (two) times a day.     glipiZIDE (GLUCOTROL) 5 MG tablet Take 5 mg by mouth 2 (two) times a day before meals.     HYDROmorphone (DILAUDID) 2 MG tablet Take 2 mg by mouth every 3 (three) hours as needed for pain. 0.5 to 1 tab every 3 hours prn     LANTUS SOLOSTAR U-100 INSULIN 100 unit/mL (3 mL) pen Inject 45 Units under the skin at bedtime. 11.65 Type 2 with hyperglycemia  Contact provider if insulin prescribed is not the preferred insulin per insurance.     metFORMIN (GLUCOPHAGE) 1000 MG tablet Take 1,000 mg by mouth 2 (two) times a day with meals.     polyethylene glycol (MIRALAX) 17 gram packet Take 1 packet (17 g total) by mouth 2 (two) times a day.     senna-docusate (PERICOLACE) 8.6-50 mg tablet Take 1 tablet by mouth 2 (two) times a day.     sitaGLIPtin (JANUVIA) 100 MG tablet Take 100 mg by mouth daily.     No current facility-administered medications on file prior to visit.        ALLERGIES:  No Known Allergies      PHYSICAL EXAMINATION:  Vital signs 189 pounds, 110/66, 97.0, 83, 20, 93% room air  General: Awake, Alert, oriented to self, not in any form of acute distress, answers questions appropriately via , follows simple  commands, obese  HEENT: Pink conjunctiva, anicteric sclerae, oral mucosa is moist  NECK: Supple, without any lymphadenopathy, thyromegaly or any masses  LUNG: Clear to auscultation, good chest expansion. There are no crackles, no wheezes, normal AP diameter  BACK: No kyphosis of the thoracic spine him a surgical site is clean without any redness or discharge or tenderness  CVS: There is good S1  S2, there are no murmurs, no heaves, rhythm is regular  ABDOMEN: Globular and soft, nontender to palpation, no organomegaly, good bowel sounds  EXTREMITIES: Good range of motion on both upper and lower extremities with foot drop noted on left foot greater than right, trace pedal edema, no cyanosis or clubbing, no calf tenderness  SKIN: Warm and dry, no rashes  noted    LABS:  Lab Results   Component Value Date    WBC 7.3 04/09/2019    HGB 10.7 (L) 04/09/2019    HCT 33.3 (L) 04/09/2019    MCV 99 04/09/2019     04/09/2019     Lab Results   Component Value Date    CREATININE 0.61 03/31/2019    BUN 11 03/31/2019     03/31/2019    K 3.7 03/31/2019     (H) 03/31/2019    CO2 27 03/31/2019           >45 minutes of total time spent, greater than 55% of the time spent in coordination of care and counseling regarding the above medical issues and plan of care including discussion with nursing staff regarding her current use of insulin, blood sugar monitoring, continued physical therapy.  Also discussed with patient regarding diabetes and treatment.  She states that she was not aware that she is on insulin and that she would prefer to be off it when she goes home. I have reviewed the patient's medical records, labs and medications.       Electronically signed by:Kaila Ovalle MD

## 2021-05-27 NOTE — PROGRESS NOTES
"Code Status:  DNR  Visit Type: Follow Up     Facility:  [facility] [[id number]]      Facility Type: SNF (Skilled Nursing Facility, TCU)    History of Present Illness:    Trinidad Brown is a 74 y.o. female seen today for follow-up TCU visit.  She had a hospitalization at [facility] 3/20 424/2/2019.  She has a past medical history for 4 type 2 diabetes.  She had a fall at home with persistent pain and was seen in the ER.  She had a CT of her spine which showed significant spinal stenosis of her L3 to L5-S1.  An MRI showed lumbar spondylosis with mild acute active compression fracture and moderate edema.  She did have a disc herniation and ligamentous changes on MRI.  Neurosurgery was consulted and she was taken to surgery on 3/29/19 for an L-spine decompressive laminectomy.  Her incision site was stapled closed and postop course was complicated by acute blood loss anemia with a discharge hemoglobin of 8.7 down from baseline of 14.9.  He was also treated for an E. coli UTI with 5 days of amoxicillin.  She was discharged to the TCU with orders to wear a TLSO when out of bed.  She also had significant left foot drop which per patient was chronic and an AFO was ordered.  She did show cognitive impairment however due to cultural issues they were unable to do good cognitive testing while in the hospital.  Her daughter had reported to hospital staff that her memory issues were declining the past couple months.  It was also recommended that she have osteoporosis workup as an outpatient.    Today, I met with patient with a video .  She is resistant to conversation today stating \"why am I being held here I did not do anything wrong\".  I was not able to rationalize with her the fact that she continued to need therapy due to her weakness and inability to stand or transfer on her own.  I did request nursing to contact family and see if they could visit today to improve her mood.  Her confusion that was " apparent upon admission has now cleared and she has no complaints of urinary issues.  However she is not easily rationalized with which seems more likely due to a language barrier.  She denies any pain it is taking Dilaudid 2 mg 1-2 times a day.  Her blood sugars have been stable with a decrease in Lantus to 42 units and a discontinuation of glipizide.  He was on Sitagliptin and metformin along with the Lantus.    Past Medical History:   Diagnosis Date     Squamous cell carcinoma of back      Type 2 diabetes mellitus with hyperglycemia (H) 12/9/2015     Past Surgical History:   Procedure Laterality Date     BACK SURGERY      tumor removal     LUMBAR LAMINECTOMY Bilateral 3/29/2019    Procedure: Lumbar 3- Lumbar-4, Lumbar 4-Lumbar -5, Lumbar 5-Sacral 1,  decompressive laminectomy.;  Surgeon: Naeem Thomas MD;  Location: [facility] OR;  Service: Spine     Family History   Problem Relation Age of Onset     Stroke Daughter      Hypertension Daughter      No Medical Problems Son      Social History     Socioeconomic History     Marital status: Single     Spouse name: Not on file     Number of children: Not on file     Years of education: Not on file     Highest education level: Not on file   Occupational History     Not on file   Social Needs     Financial resource strain: Not on file     Food insecurity:     Worry: Not on file     Inability: Not on file     Transportation needs:     Medical: Not on file     Non-medical: Not on file   Tobacco Use     Smoking status: Never Smoker     Smokeless tobacco: Never Used   Substance and Sexual Activity     Alcohol use: No     Drug use: No     Sexual activity: Yes     Birth control/protection: Post-menopausal   Lifestyle     Physical activity:     Days per week: Not on file     Minutes per session: Not on file     Stress: Not on file   Relationships     Social connections:     Talks on phone: Not on file     Gets together: Not on file     Attends Spiritism service: Not  on file     Active member of club or organization: Not on file     Attends meetings of clubs or organizations: Not on file     Relationship status: Not on file     Intimate partner violence:     Fear of current or ex partner: Not on file     Emotionally abused: Not on file     Physically abused: Not on file     Forced sexual activity: Not on file   Other Topics Concern     Not on file   Social History Narrative     Not on file       Current Outpatient Medications   Medication Sig Dispense Refill     atorvastatin (LIPITOR) 40 MG tablet Take 40 mg by mouth at bedtime.       calcium carbonate-vitamin D3 (CALCIUM WITH VITAMIN D) 600 mg(1,500mg) -400 unit per tablet Take 1 tablet by mouth 2 (two) times a day.       HYDROmorphone (DILAUDID) 2 MG tablet Take 2 mg by mouth every 3 (three) hours as needed for pain. 0.5 to 1 tab every 3 hours prn       LANTUS SOLOSTAR U-100 INSULIN 100 unit/mL (3 mL) pen Inject 45 Units under the skin at bedtime. 11.65 Type 2 with hyperglycemia  Contact provider if insulin prescribed is not the preferred insulin per insurance. 15 mL 0     metFORMIN (GLUCOPHAGE) 1000 MG tablet Take 1,000 mg by mouth 2 (two) times a day with meals.       polyethylene glycol (MIRALAX) 17 gram packet Take 1 packet (17 g total) by mouth 2 (two) times a day. 60 packet 0     senna-docusate (PERICOLACE) 8.6-50 mg tablet Take 1 tablet by mouth 2 (two) times a day. 60 tablet 0     sitaGLIPtin (JANUVIA) 100 MG tablet Take 100 mg by mouth daily.       No current facility-administered medications for this visit.      No Known Allergies  Immunization History   Administered Date(s) Administered     Influenza high dose, seasonal 03/26/2019     Influenza, inj, historic,unspecified 09/16/2015         Review of Systems   Review of systems difficult as patient does not answer all questioning.  She states she has a headache and would like to get out of her TLSO and lie back in bed.        Physical Exam   Vital signs: /46,  heart rate 70, respiratory 16, temp 97.7.   GENERAL APPEARANCE: Well developed, obese woman in no acute distress  HEENT: normocephalic, atraumatic  PERRL, sclerae anicteric, conjunctivae clear and moist, EOM intact  LUNGS: Lung sounds CTA, no adventitious sounds, respiratory effort normal.  CARD: RRR, S1, S2, without murmurs, gallops, rubs,   ABD: Soft and nontender with normal bowel sounds.   EXTREMITIES: No cyanosis, clubbing or edema.  Good CMS with positive pulses to lower extremities.  NEURO: Alert and oriented, face is symmetric.  Good lower extremity sensation.   SKIN: no new skin issues.  Did not view back incision as she is sitting up in chair with TLSO on  PSYCH: euthymic            Labs:    Recent Results (from the past 240 hour(s))   Ferritin   Result Value Ref Range    Ferritin 267 (H) 10 - 130 ng/mL   Iron   Result Value Ref Range    Iron 47 42 - 175 ug/dL   Thyroid Stimulating Hormone (TSH)   Result Value Ref Range    TSH 0.26 (L) 0.30 - 5.00 uIU/mL   HM1 (CBC with Diff)   Result Value Ref Range    WBC 7.3 4.0 - 11.0 thou/uL    RBC 3.38 (L) 3.80 - 5.40 mill/uL    Hemoglobin 10.7 (L) 12.0 - 16.0 g/dL    Hematocrit 33.3 (L) 35.0 - 47.0 %    MCV 99 80 - 100 fL    MCH 31.7 27.0 - 34.0 pg    MCHC 32.1 32.0 - 36.0 g/dL    RDW 14.1 11.0 - 14.5 %    Platelets 333 140 - 440 thou/uL    MPV 8.7 8.5 - 12.5 fL    Neutrophils % 66 50 - 70 %    Lymphocytes % 26 20 - 40 %    Monocytes % 7 2 - 10 %    Eosinophils % 1 0 - 6 %    Basophils % 0 0 - 2 %    Neutrophils Absolute 4.8 2.0 - 7.7 thou/uL    Lymphocytes Absolute 1.9 0.8 - 4.4 thou/uL    Monocytes Absolute 0.5 0.0 - 0.9 thou/uL    Eosinophils Absolute 0.1 0.0 - 0.4 thou/uL    Basophils Absolute 0.0 0.0 - 0.2 thou/uL         Assessment:  1. S/P laminectomy     2. Closed compression fracture of third lumbar vertebra, sequela     3. Pain management     4. Type 2 diabetes mellitus with hyperglycemia, with long-term current use of insulin (H)         Plan:   Status  post laminectomy and compression fracture: She was seen by neurosurgery yesterday and everything appears to be going well and will return next week for removal of her staples.    Pain management: I will decrease her Dilaudid dose to 0.5 mg to 1 mg every 4 hours as it appears her pain is improving.    Diabetes: This is stable on her med changes from last week we will continue with Lantus at 42 units, sitagliptin and metformin.              Electronically signed by: Teri Brush CNP

## 2021-05-27 NOTE — TELEPHONE ENCOUNTER
Instructed Teri to call [facility] to look at brace and possibly adjust. Phone number provided.  Fadumo Mack RN

## 2021-05-27 NOTE — TELEPHONE ENCOUNTER
PATIENT NAME:  Trinidad Brown  YOB: 1945  MRN: [id number]  SURGEON: Dr. Thomas  DATE of SURGERY: 3-29-19  PROCEDURE: Lumbar 3- Lumbar-4, Lumbar 4-Lumbar -5, Lumbar 5-Sacral 1,  decompressive laminectomy.  L1 fracture    FOLLOW-UP:  Wound Check:  7 days [On nurse schedule unless noted otherwise]  Staples/Sutures Out : 21 Days [On nurse schedule unless noted otherwise]  Post Op Visit:3 weeks with xrays and  6+ weeks   Post-op Provider: NP  DIAGNOSTICS:  radiology: X-Ray: ap/lat lumbar, upright in brace  At 3 week appt.(ordered 4-2-19)  DISPOSITION:  To [facility]  [phone]    ADDITIONAL INSTRUCTIONS FOR MEDICAL STAFF:      We did a laminectomy but she also has L1 fracture so we should get xrays at her 3 week follow up with np. AP/LAT lumbar upright in brace.     Needs  and likely prefers [facility] if possible

## 2021-05-27 NOTE — PROGRESS NOTES
Code Status:  DNR  Visit Type: Follow Up     Facility:  [facility] [[id number]]      Facility Type: SNF (Skilled Nursing Facility, TCU)    History of Present Illness:    Trinidad Brown is a 74 y.o. female seen today for follow-up TCU visit.  She had a hospitalization at [facility] 3/20 424/2/2019.  She has a past medical history for 4 type 2 diabetes.  She had a fall at home with persistent pain and was seen in the ER.  She had a CT of her spine which showed significant spinal stenosis of her L3 to L5-S1.  An MRI showed lumbar spondylosis with mild acute active compression fracture and moderate edema.  She did have a disc herniation and ligamentous changes on MRI.  Neurosurgery was consulted and she was taken to surgery on 3/29/19 for an L-spine decompressive laminectomy.  Her incision site was stapled closed and postop course was complicated by acute blood loss anemia with a discharge hemoglobin of 8.7 down from baseline of 14.9.  He was also treated for an E. coli UTI with 5 days of amoxicillin.  She was discharged to the TCU with orders to wear a TLSO when out of bed.  She also had significant left foot drop which per patient was chronic and an AFO was ordered.  She did show cognitive impairment however due to cultural issues they were unable to do good cognitive testing while in the hospital.  Her daughter had reported to hospital staff that her memory issues were declining the past couple months.  It was also recommended that she have osteoporosis workup as an outpatient.    Today, I met with her grand-daughter and talked about the patient progress.  I explained that her progress is delayed due to her leg weakness and foot drops.  I explained that she is very motivated to participate with therapy however her recovery is slow due to the weakness she had before the surgery.  I discussed with her her follow up and her pain.  I also let her know that again [facility] will be coming out to  make more adjustments on her tLSO.  Ms. Brown denies any issues with bowels or bladder.  She completed her Cipro for UTI yesterday. Granddaughter states her cognitive status seems to have improved this week. BS stable after decreasing lantus and dcing glipizide this week.     Past Medical History:   Diagnosis Date     Squamous cell carcinoma of back      Type 2 diabetes mellitus with hyperglycemia (H) 12/9/2015     Past Surgical History:   Procedure Laterality Date     BACK SURGERY      tumor removal     LUMBAR LAMINECTOMY Bilateral 3/29/2019    Procedure: Lumbar 3- Lumbar-4, Lumbar 4-Lumbar -5, Lumbar 5-Sacral 1,  decompressive laminectomy.;  Surgeon: Naeem Thomas MD;  Location: [facility] OR;  Service: Spine     Family History   Problem Relation Age of Onset     Stroke Daughter      Hypertension Daughter      No Medical Problems Son      Social History     Socioeconomic History     Marital status: Single     Spouse name: Not on file     Number of children: Not on file     Years of education: Not on file     Highest education level: Not on file   Occupational History     Not on file   Social Needs     Financial resource strain: Not on file     Food insecurity:     Worry: Not on file     Inability: Not on file     Transportation needs:     Medical: Not on file     Non-medical: Not on file   Tobacco Use     Smoking status: Never Smoker     Smokeless tobacco: Never Used   Substance and Sexual Activity     Alcohol use: No     Drug use: No     Sexual activity: Yes     Birth control/protection: Post-menopausal   Lifestyle     Physical activity:     Days per week: Not on file     Minutes per session: Not on file     Stress: Not on file   Relationships     Social connections:     Talks on phone: Not on file     Gets together: Not on file     Attends Yarsanism service: Not on file     Active member of club or organization: Not on file     Attends meetings of clubs or organizations: Not on file     Relationship  status: Not on file     Intimate partner violence:     Fear of current or ex partner: Not on file     Emotionally abused: Not on file     Physically abused: Not on file     Forced sexual activity: Not on file   Other Topics Concern     Not on file   Social History Narrative     Not on file       Current Outpatient Medications   Medication Sig Dispense Refill     atorvastatin (LIPITOR) 40 MG tablet Take 40 mg by mouth at bedtime.       calcium carbonate-vitamin D3 (CALCIUM WITH VITAMIN D) 600 mg(1,500mg) -400 unit per tablet Take 1 tablet by mouth 2 (two) times a day.       HYDROmorphone (DILAUDID) 2 MG tablet Take 2 mg by mouth every 3 (three) hours as needed for pain. 0.5 to 1 tab every 3 hours prn       LANTUS SOLOSTAR U-100 INSULIN 100 unit/mL (3 mL) pen Inject 45 Units under the skin at bedtime. 11.65 Type 2 with hyperglycemia  Contact provider if insulin prescribed is not the preferred insulin per insurance. 15 mL 0     metFORMIN (GLUCOPHAGE) 1000 MG tablet Take 1,000 mg by mouth 2 (two) times a day with meals.       polyethylene glycol (MIRALAX) 17 gram packet Take 1 packet (17 g total) by mouth 2 (two) times a day. 60 packet 0     senna-docusate (PERICOLACE) 8.6-50 mg tablet Take 1 tablet by mouth 2 (two) times a day. 60 tablet 0     sitaGLIPtin (JANUVIA) 100 MG tablet Take 100 mg by mouth daily.       No current facility-administered medications for this visit.      No Known Allergies  Immunization History   Administered Date(s) Administered     Influenza high dose, seasonal 03/26/2019     Influenza, inj, historic,unspecified 09/16/2015         Review of Systems   Patient denies fever, chills, headache, lightheadedness, dizziness, rhinorrhea, cough, congestion, shortness of breath, chest pain, palpitations, abdominal pain, n/v, diarrhea, constipation, change in appetite, dysuria, frequency, burning or pain with urination.  Other than stated in HPI all other review of systems is negative.         Physical  Exam   Vital signs: /68, HR 80, resp 16, temp 96.8   GENERAL APPEARANCE: Well developed, obese woman moaning in her bed.   HEENT: normocephalic, atraumatic  PERRL, sclerae anicteric, conjunctivae clear and moist, EOM intact  LUNGS: Lung sounds CTA, no adventitious sounds, respiratory effort normal.  CARD: RRR, S1, S2, without murmurs, gallops, rubs, no JVD,   ABD: Soft and nontender with normal bowel sounds.   EXTREMITIES: No cyanosis, clubbing or edema.  Good CMS with positive pulses to lower extremities.  NEURO: Alert and oriented, face is symmetric.  Good lower extremity sensation.  Foot drop on right, able to move toes.  SKIN: no new skin issues.  PSYCH: euthymic            Labs:    Recent Results (from the past 240 hour(s))   Urinalysis-UC if Indicated   Result Value Ref Range    Color, UA Yellow Colorless, Yellow, Straw, Light Yellow    Clarity, UA Cloudy (!) Clear    Glucose, UA Negative Negative    Bilirubin, UA Negative Negative    Ketones, UA Negative Negative    Specific Gravity, UA 1.017 1.001 - 1.030    Blood, UA Trace (!) Negative    pH, UA 6.5 4.5 - 8.0    Protein, UA Trace (!) Negative mg/dL    Urobilinogen, UA <2.0 E.U./dL <2.0 E.U./dL, 2.0 E.U./dL    Nitrite, UA Positive (!) Negative    Leukocytes, UA Large (!) Negative    Bacteria, UA Many (!) None Seen hpf    RBC, UA 0-2 None Seen, 0-2 hpf    WBC, UA >100 (!) None Seen, 0-5 hpf    Squam Epithel, UA 10-25 (!) None Seen, 0-5 lpf    Mucus, UA Moderate (!) None Seen lpf   Culture, Urine   Result Value Ref Range    Culture Mixture of urogenital organisms with     Culture >100,000 col/ml Escherichia coli (!)        Susceptibility    Escherichia coli - CR     Amoxicillin + Clavulanate <=4/2 Sensitive      Ampicillin <=4 Sensitive      Cefazolin 2 Sensitive      Cefepime <=1 Sensitive      Ceftriaxone <=1 Sensitive      Ciprofloxacin <=0.5 Sensitive      Gentamicin <=2 Sensitive      Levofloxacin <=1 Sensitive      Meropenem <=0.25 Sensitive       Nitrofurantoin 32 Sensitive      Tetracycline <=2 Sensitive      Tobramycin <=2 Sensitive      Trimethoprim + Sulfamethoxazole <=0.5 Sensitive    Hemoglobin   Result Value Ref Range    Hemoglobin 8.8 (L) 12.0 - 16.0 g/dL   Ferritin   Result Value Ref Range    Ferritin 267 (H) 10 - 130 ng/mL   Iron   Result Value Ref Range    Iron 47 42 - 175 ug/dL   Thyroid Stimulating Hormone (TSH)   Result Value Ref Range    TSH 0.26 (L) 0.30 - 5.00 uIU/mL   HM1 (CBC with Diff)   Result Value Ref Range    WBC 7.3 4.0 - 11.0 thou/uL    RBC 3.38 (L) 3.80 - 5.40 mill/uL    Hemoglobin 10.7 (L) 12.0 - 16.0 g/dL    Hematocrit 33.3 (L) 35.0 - 47.0 %    MCV 99 80 - 100 fL    MCH 31.7 27.0 - 34.0 pg    MCHC 32.1 32.0 - 36.0 g/dL    RDW 14.1 11.0 - 14.5 %    Platelets 333 140 - 440 thou/uL    MPV 8.7 8.5 - 12.5 fL    Neutrophils % 66 50 - 70 %    Lymphocytes % 26 20 - 40 %    Monocytes % 7 2 - 10 %    Eosinophils % 1 0 - 6 %    Basophils % 0 0 - 2 %    Neutrophils Absolute 4.8 2.0 - 7.7 thou/uL    Lymphocytes Absolute 1.9 0.8 - 4.4 thou/uL    Monocytes Absolute 0.5 0.0 - 0.9 thou/uL    Eosinophils Absolute 0.1 0.0 - 0.4 thou/uL    Basophils Absolute 0.0 0.0 - 0.2 thou/uL         Assessment:  1. S/P laminectomy     2. Closed compression fracture of third lumbar vertebra, sequela     3. Pain management     4. Type 2 diabetes mellitus with hyperglycemia, with long-term current use of insulin (H)     5. Bilateral foot-drop         Plan:   Laminectomy and compression fracture: continue TLSO when up.  Continue therapy    Pain management continue dilaudid 1mg every 3 hours prn, continue scheduled tylenol.     DM: continue with Lantus 42u, Metformin 1000mg two times a day and sitagliptan    Bilateral foot drop: nursing to assure they use pillows to place feet in dorsa-flexion while in bed.               Electronically signed by: Teri Brush CNP

## 2021-05-27 NOTE — PROGRESS NOTES
"S: Patient is a 72 y/o female, 5'4\", 190 lbs seen today at [facility], room 427, for the fitting and delivery of her custom TLSO on orders from Alana Lopez CNP for the treatment of Dx: L1 compression fracture/ severe L4-L5 spinal stenosis.     O: Patient presented supine in her hospital bed at the time of my arrival.  Patient seemed slightly confused during our appointment today. Patient's physician did arrive with an  midway through our appointment.     G: Patient's custom TLSO will help to support and stabilize her affected spinal vertebra to provide triplanar control through soft tissue compression in order to prevent further injury and allow her spine to heal properly. Patient requires a custom TLSO due to her unique body morphology that could not be accommodated by any OTS option and she has multi-level spinal involvement.     A: I fit the patient with her custom TLSO as she remained supine in her hospital bed. Patient was log-rolled with the aid of hospital staff in order to properly don her custom TLSO. TLSO positioning was adjusted a few times during our appointment today as it has a tendency to shift superior due to the patient's excess soft tissue. Once the TLSO was positioned properly, fit was as intended. Patient and I then discussed, through , care and use instructions for her custom TLSO. Patient is scheduled for surgery for her lumbar spinal stenosis tomorrow morning.     P: Patient was given my card and asked to call our office if there are any issues with the fit and function of her custom TLSO.   "

## 2021-05-27 NOTE — PROGRESS NOTES
Code Status:  DNR  Visit Type: Follow Up     Facility:  [facility] [[id number]]      Facility Type: SNF (Skilled Nursing Facility, TCU)    History of Present Illness:   Hospital Admission Date: 3/24/2019  Hospital Discharge Date: 4/2/2019  Facility Admission Date: 4/2/2019    Trinidad Brown is a 73 y.o. female seen today for follow-up TCU visit.  She had a hospitalization at [facility] 3/20 424/2/2019.  She has a past medical history for 4 type 2 diabetes.  She had a fall at home with persistent pain and was seen in the ER.  She had a CT of her spine which showed significant spinal stenosis of her L3 to L5-S1.  An MRI showed lumbar spondylosis with mild acute active compression fracture and moderate edema.  She did have a disc herniation and ligamentous changes on MRI.  Neurosurgery was consulted and she was taken to surgery on 3/29/19 for an L-spine decompressive laminectomy.  Her incision site was stapled closed and postop course was complicated by acute blood loss anemia with a discharge hemoglobin of 8.7 down from baseline of 14.9.  He was also treated for an E. coli UTI with 5 days of amoxicillin.  She was discharged to the TCU with orders to wear a TLSO when out of bed.  She also had significant left foot drop which per patient was chronic and an AFO was ordered.  She did show cognitive impairment however due to cultural issues they were unable to do good cognitive testing while in the hospital.  Her daughter had reported to hospital staff that her memory issues were declining the past couple months.  She is to follow-up with neuro surgery in 1 week for an incision check and have her staples removed in 3 weeks.  It was also recommended that she have osteoporosis workup as an outpatient.    Today, I meet with her with an , she complains of lower back pain with twisting type movement.  She denies any pain into her lower extremities and states the pain medication does help improve her  pain.  I did speak with neurosurgery earlier this week in regards to her TLSO and they recommended she have it adjusted by [facility] who came out and trimmed down some of the bulk in length.  Therapy reports that they are able to get her to participate more in therapy now with this better fitted TLSO.  She has good CMS to lower extremities bilaterally and shows no signs and symptoms of DVT.  Is able to wiggle her toes easily.  She states she has a good appetite.  Her incision is well approximated with staples and free of edema, erythema, and or drainage.  Her hemoglobin was checked today at 8.8 which is stable.  She had a UA UC done earlier this week due to family reporting increased confusion.  The UA showed she has a urinary tract infection awaiting UC results in order to determine treatment plan.  She denies any symptoms of dysuria, burning or frequency.    Past Medical History:   Diagnosis Date     Squamous cell carcinoma of back      Type 2 diabetes mellitus with hyperglycemia (H) 12/9/2015     Past Surgical History:   Procedure Laterality Date     BACK SURGERY      tumor removal     LUMBAR LAMINECTOMY Bilateral 3/29/2019    Procedure: Lumbar 3- Lumbar-4, Lumbar 4-Lumbar -5, Lumbar 5-Sacral 1,  decompressive laminectomy.;  Surgeon: Naeem Thomas MD;  Location: [facility] [place];  Service: Spine     Family History   Problem Relation Age of Onset     Stroke Daughter      Hypertension Daughter      No Medical Problems Son      Social History     Socioeconomic History     Marital status: Single     Spouse name: Not on file     Number of children: Not on file     Years of education: Not on file     Highest education level: Not on file   Occupational History     Not on file   Social Needs     Financial resource strain: Not on file     Food insecurity:     Worry: Not on file     Inability: Not on file     Transportation needs:     Medical: Not on file     Non-medical: Not on file   Tobacco Use      Smoking status: Never Smoker     Smokeless tobacco: Never Used   Substance and Sexual Activity     Alcohol use: No     Drug use: No     Sexual activity: Yes     Birth control/protection: Post-menopausal   Lifestyle     Physical activity:     Days per week: Not on file     Minutes per session: Not on file     Stress: Not on file   Relationships     Social connections:     Talks on phone: Not on file     Gets together: Not on file     Attends [employer]ism service: Not on file     Active member of club or organization: Not on file     Attends meetings of clubs or organizations: Not on file     Relationship status: Not on file     Intimate partner violence:     Fear of current or ex partner: Not on file     Emotionally abused: Not on file     Physically abused: Not on file     Forced sexual activity: Not on file   Other Topics Concern     Not on file   Social History Narrative     Not on file       Current Outpatient Medications   Medication Sig Dispense Refill     atorvastatin (LIPITOR) 40 MG tablet Take 40 mg by mouth at bedtime.       calcium carbonate-vitamin D3 (CALCIUM WITH VITAMIN D) 600 mg(1,500mg) -400 unit per tablet Take 1 tablet by mouth 2 (two) times a day.       glipiZIDE (GLUCOTROL) 5 MG tablet Take 5 mg by mouth 2 (two) times a day before meals.       HYDROmorphone (DILAUDID) 2 MG tablet Take 2 mg by mouth every 3 (three) hours as needed for pain. 0.5 to 1 tab every 3 hours prn       LANTUS SOLOSTAR U-100 INSULIN 100 unit/mL (3 mL) pen Inject 45 Units under the skin at bedtime. 11.65 Type 2 with hyperglycemia  Contact provider if insulin prescribed is not the preferred insulin per insurance. 15 mL 0     metFORMIN (GLUCOPHAGE) 1000 MG tablet Take 1,000 mg by mouth 2 (two) times a day with meals.       polyethylene glycol (MIRALAX) 17 gram packet Take 1 packet (17 g total) by mouth 2 (two) times a day. 60 packet 0     senna-docusate (PERICOLACE) 8.6-50 mg tablet Take 1 tablet by mouth 2 (two) times a day. 60  tablet 0     sitaGLIPtin (JANUVIA) 100 MG tablet Take 100 mg by mouth daily.       No current facility-administered medications for this visit.      No Known Allergies  Immunization History   Administered Date(s) Administered     Influenza high dose, seasonal 03/26/2019     Influenza, inj, historic,unspecified 09/16/2015         Review of Systems   Patient denies fever, chills, headache, lightheadedness, dizziness, rhinorrhea, cough, congestion, shortness of breath, chest pain, palpitations, abdominal pain, n/v, diarrhea, constipation, change in appetite, dysuria, frequency, burning or pain with urination.  Other than stated in HPI all other review of systems is negative.         Physical Exam   Vital signs: /58, heart rate 81, respiratory 12, temp 96.4, blood sugars  .  GENERAL APPEARANCE: Well developed, obese woman moaning in her bed.   HEENT: normocephalic, atraumatic  PERRL, sclerae anicteric, conjunctivae clear and moist, EOM intact  LUNGS: Lung sounds CTA, no adventitious sounds, respiratory effort normal.  CARD: RRR, S1, S2, without murmurs, gallops, rubs, no JVD,   ABD: Soft and nontender with normal bowel sounds.   MSK: Lower extremities show strength of 4/5, upper extremities show normal strength.  EXTREMITIES: No cyanosis, clubbing or edema.  Good CMS with positive pulses to lower extremities.  NEURO: Alert and tenable cognitive impairment. Face is symmetric.  Good lower extremity sensation.  Foot drop on right, able to move toes.  SKIN: Midline back lumbar surgical incision well approximated with staples, no edema, erythema, drainage.  PSYCH: euthymic            Labs:    Recent Results (from the past 240 hour(s))   POCT Glucose   Result Value Ref Range    Glucose 193 (H) 70 - 139 mg/dL   POCT Glucose - 4 times daily before meals and at bedtime   Result Value Ref Range    Glucose 239 (H) 70 - 139 mg/dL   POCT Glucose - 4 times daily before meals and at bedtime   Result Value Ref Range     Glucose 154 (H) 70 - 139 mg/dL   POCT Glucose   Result Value Ref Range    Glucose 295 (H) 70 - 139 mg/dL   POCT Glucose   Result Value Ref Range    Glucose 198 (H) 70 - 139 mg/dL   HM2(CBC w/o Differential)   Result Value Ref Range    WBC 5.6 4.0 - 11.0 thou/uL    RBC 3.92 3.80 - 5.40 mill/uL    Hemoglobin 12.5 12.0 - 16.0 g/dL    Hematocrit 37.5 35.0 - 47.0 %    MCV 96 80 - 100 fL    MCH 31.9 27.0 - 34.0 pg    MCHC 33.3 32.0 - 36.0 g/dL    RDW 11.9 11.0 - 14.5 %    Platelets 184 140 - 440 thou/uL    MPV 9.2 8.5 - 12.5 fL   POCT Glucose   Result Value Ref Range    Glucose 193 (H) 70 - 139 mg/dL   POCT Glucose   Result Value Ref Range    Glucose 206 (H) 70 - 139 mg/dL   POCT Glucose - 4 times daily before meals and at bedtime   Result Value Ref Range    Glucose 264 (H) 70 - 139 mg/dL   POCT Glucose - 4 times daily before meals and at bedtime   Result Value Ref Range    Glucose 97 70 - 139 mg/dL   POCT Glucose - 4 times daily before meals and at bedtime   Result Value Ref Range    Glucose 203 (H) 70 - 139 mg/dL   POCT Glucose   Result Value Ref Range    Glucose 202 (H) 70 - 139 mg/dL   Basic Metabolic Panel   Result Value Ref Range    Sodium 138 136 - 145 mmol/L    Potassium 4.0 3.5 - 5.0 mmol/L    Chloride 107 98 - 107 mmol/L    CO2 23 22 - 31 mmol/L    Anion Gap, Calculation 8 5 - 18 mmol/L    Glucose 163 (H) 70 - 125 mg/dL    Calcium 8.6 8.5 - 10.5 mg/dL    BUN 8 8 - 28 mg/dL    Creatinine 0.66 0.60 - 1.10 mg/dL    GFR MDRD Af Amer >60 >60 mL/min/1.73m2    GFR MDRD Non Af Amer >60 >60 mL/min/1.73m2   HM2(CBC w/o Differential)   Result Value Ref Range    WBC 4.7 4.0 - 11.0 thou/uL    RBC 4.00 3.80 - 5.40 mill/uL    Hemoglobin 12.7 12.0 - 16.0 g/dL    Hematocrit 37.0 35.0 - 47.0 %    MCV 93 80 - 100 fL    MCH 31.8 27.0 - 34.0 pg    MCHC 34.3 32.0 - 36.0 g/dL    RDW 11.9 11.0 - 14.5 %    Platelets 193 140 - 440 thou/uL    MPV 8.7 8.5 - 12.5 fL   POCT Glucose   Result Value Ref Range    Glucose 156 (H) 70 - 139 mg/dL    POCT Glucose   Result Value Ref Range    Glucose 157 (H) 70 - 139 mg/dL   POCT Glucose - 4 times daily before meals and at bedtime   Result Value Ref Range    Glucose 241 (H) 70 - 139 mg/dL   POCT Glucose - 4 times daily before meals and at bedtime   Result Value Ref Range    Glucose 231 (H) 70 - 139 mg/dL   POCT Glucose - 4 times daily before meals and at bedtime   Result Value Ref Range    Glucose 65 (L) 70 - 139 mg/dL   POCT Glucose   Result Value Ref Range    Glucose 149 (H) 70 - 139 mg/dL   POCT Glucose   Result Value Ref Range    Glucose 77 70 - 139 mg/dL   POCT Glucose   Result Value Ref Range    Glucose 113 70 - 139 mg/dL   POCT Glucose   Result Value Ref Range    Glucose 162 (H) 70 - 139 mg/dL   HM2(CBC w/o Differential)   Result Value Ref Range    WBC 6.3 4.0 - 11.0 thou/uL    RBC 3.67 (L) 3.80 - 5.40 mill/uL    Hemoglobin 11.6 (L) 12.0 - 16.0 g/dL    Hematocrit 34.8 (L) 35.0 - 47.0 %    MCV 95 80 - 100 fL    MCH 31.6 27.0 - 34.0 pg    MCHC 33.3 32.0 - 36.0 g/dL    RDW 12.1 11.0 - 14.5 %    Platelets 220 140 - 440 thou/uL    MPV 8.9 8.5 - 12.5 fL   Glucose   Result Value Ref Range    Glucose 89 70 - 125 mg/dL    Patient Fasting > 8hrs? Unknown    POCT Glucose - 4 times daily before meals and at bedtime   Result Value Ref Range    Glucose 112 70 - 139 mg/dL   POCT Glucose - 4 times daily before meals and at bedtime   Result Value Ref Range    Glucose 178 (H) 70 - 139 mg/dL   POCT Glucose - 4 times daily before meals and at bedtime   Result Value Ref Range    Glucose 243 (H) 70 - 139 mg/dL   Hemoglobin   Result Value Ref Range    Hemoglobin 9.9 (L) 12.0 - 16.0 g/dL   Platelet Count - every other day x 3   Result Value Ref Range    Platelets 191 140 - 440 thou/uL   POCT Glucose - 4 times daily before meals and at bedtime   Result Value Ref Range    Glucose 424 (H) 70 - 139 mg/dL   Glucose, Random   Result Value Ref Range    Glucose 416 (HH) 70 - 125 mg/dL    Patient Fasting > 8hrs? Unknown    POCT Glucose -  4 times daily before meals and at bedtime   Result Value Ref Range    Glucose 385 (H) 70 - 139 mg/dL   POCT Glucose - 4 times daily before meals and at bedtime   Result Value Ref Range    Glucose 435 (H) 70 - 139 mg/dL   POCT Glucose - 4 times daily before meals and at bedtime   Result Value Ref Range    Glucose 283 (H) 70 - 139 mg/dL   HM2(CBC w/o Differential)   Result Value Ref Range    WBC 13.1 (H) 4.0 - 11.0 thou/uL    RBC 2.75 (L) 3.80 - 5.40 mill/uL    Hemoglobin 8.7 (L) 12.0 - 16.0 g/dL    Hematocrit 26.1 (L) 35.0 - 47.0 %    MCV 95 80 - 100 fL    MCH 31.6 27.0 - 34.0 pg    MCHC 33.3 32.0 - 36.0 g/dL    RDW 12.4 11.0 - 14.5 %    Platelets 192 140 - 440 thou/uL    MPV 8.7 8.5 - 12.5 fL   Basic Metabolic Panel   Result Value Ref Range    Sodium 140 136 - 145 mmol/L    Potassium 3.7 3.5 - 5.0 mmol/L    Chloride 109 (H) 98 - 107 mmol/L    CO2 27 22 - 31 mmol/L    Anion Gap, Calculation 4 (L) 5 - 18 mmol/L    Glucose 126 (H) 70 - 125 mg/dL    Calcium 8.4 (L) 8.5 - 10.5 mg/dL    BUN 11 8 - 28 mg/dL    Creatinine 0.61 0.60 - 1.10 mg/dL    GFR MDRD Af Amer >60 >60 mL/min/1.73m2    GFR MDRD Non Af Amer >60 >60 mL/min/1.73m2   POCT Glucose - 4 times daily before meals and at bedtime   Result Value Ref Range    Glucose 148 (H) 70 - 139 mg/dL   POCT Glucose - 4 times daily before meals and at bedtime   Result Value Ref Range    Glucose 148 (H) 70 - 139 mg/dL   POCT Glucose - 4 times daily before meals and at bedtime   Result Value Ref Range    Glucose 163 (H) 70 - 139 mg/dL   POCT Glucose - 4 times daily before meals and at bedtime   Result Value Ref Range    Glucose 160 (H) 70 - 139 mg/dL   HM2(CBC w/o Differential)   Result Value Ref Range    WBC 11.3 (H) 4.0 - 11.0 thou/uL    RBC 2.74 (L) 3.80 - 5.40 mill/uL    Hemoglobin 8.8 (L) 12.0 - 16.0 g/dL    Hematocrit 25.7 (L) 35.0 - 47.0 %    MCV 94 80 - 100 fL    MCH 32.1 27.0 - 34.0 pg    MCHC 34.2 32.0 - 36.0 g/dL    RDW 12.9 11.0 - 14.5 %    Platelets 196 140 - 440  thou/uL    MPV 9.3 8.5 - 12.5 fL   POCT Glucose - 4 times daily before meals and at bedtime   Result Value Ref Range    Glucose 245 (H) 70 - 139 mg/dL   POCT Glucose - 4 times daily before meals and at bedtime   Result Value Ref Range    Glucose 149 (H) 70 - 139 mg/dL   POCT Glucose - 4 times daily before meals and at bedtime   Result Value Ref Range    Glucose 118 70 - 139 mg/dL   POCT Glucose - 4 times daily before meals and at bedtime   Result Value Ref Range    Glucose 153 (H) 70 - 139 mg/dL   POCT Glucose - 4 times daily before meals and at bedtime   Result Value Ref Range    Glucose 91 70 - 139 mg/dL   POCT Glucose - 4 times daily before meals and at bedtime   Result Value Ref Range    Glucose 110 70 - 139 mg/dL   Urinalysis-UC if Indicated   Result Value Ref Range    Color, UA Yellow Colorless, Yellow, Straw, Light Yellow    Clarity, UA Cloudy (!) Clear    Glucose, UA Negative Negative    Bilirubin, UA Negative Negative    Ketones, UA Negative Negative    Specific Gravity, UA 1.017 1.001 - 1.030    Blood, UA Trace (!) Negative    pH, UA 6.5 4.5 - 8.0    Protein, UA Trace (!) Negative mg/dL    Urobilinogen, UA <2.0 E.U./dL <2.0 E.U./dL, 2.0 E.U./dL    Nitrite, UA Positive (!) Negative    Leukocytes, UA Large (!) Negative    Bacteria, UA Many (!) None Seen hpf    RBC, UA 0-2 None Seen, 0-2 hpf    WBC, UA >100 (!) None Seen, 0-5 hpf    Squam Epithel, UA 10-25 (!) None Seen, 0-5 lpf    Mucus, UA Moderate (!) None Seen lpf   Hemoglobin   Result Value Ref Range    Hemoglobin 8.8 (L) 12.0 - 16.0 g/dL         Assessment:  1. S/P laminectomy     2. Closed compression fracture of third lumbar vertebra, sequela     3. Pain management     4. Type 2 diabetes mellitus with hyperglycemia, with long-term current use of insulin (H)     5. Acute cystitis without hematuria     6. Anemia, unspecified type         Plan:   Status post laminectomy and compression fracture: She reports the TLSO still is snug when she sits up.   Therapy reports it is fitting better.  I counseled her on the rationale of the TLSO to keep her spine in a neutral position in order for the fracture to heal.    Pain management: Continue with Dilaudid 1-2 mg every 3 hours as needed for pain.  Continue with scheduled Tylenol.    Diabetes: Stable continue with metformin 1000 mg twice daily and Lantus 45 units at at bedtime.    UTI: Awaiting results from urine culture in order to treat with appropriate antibiotics.  Asymptomatic at this time so will not start on Pyridium.    Anemia: Holding stable at this time.  We will recheck a CBC next week with iron studies in order to determine if there are next steps that are appropriate for treatment.              Electronically signed by: Teri Brush CNP

## 2021-05-27 NOTE — PROGRESS NOTES
"Trinidad Brown is status post lumbar L3-4-5-S1 bilateral decompressive laminectomy on 3/24/19 and 3/29/2019 by Dr. Naeem Thomas.  Also has L1 compression fracture - f/u on 4/19/2019 with AP/Lat xrays.  Today she returns in follow up for a wound check. She is here with her . [facility]ides at [facility]. In a wheelchair, able to walk short distance. In TLSO brace. Reports a mild mid back pain. Denies radicular pain. Feels her legs are getting \"stronger\".   Staples intact.  Surgical wound WNL - CDI, no signs of infection or skin breakdown.  Incision well-healed: good skin approximation, no redness or visible/palpable edema, no tenderness to palpation.  PT. AF, denies fever, chills or sweats.  Pt. reports that the symptoms are improved from pre-op.  Kayla Barrientos, RN, CNRN    "

## 2021-05-27 NOTE — PROGRESS NOTES
"Diagnosis: L1 compression fracture/ severe L4-L5 spinal stenosis    S: Patient was seen today at the [facility] room 303 with an order from Alana Bullock CNP for a custom TLSO. Patient stated that she recently fell which resulted in the fracture. 5'4\", 198 lbs.     O: Patient was in bed for the evaluation appointment. Patient presents supine in bed and was speaking with Fadumo Mcconnell CNP from neuro when I arrived. Patent was alert/oriented and was in minimal distress due to pain management.     A: Multiple measurements were taken of the patient's torso. Measurements will be sent to [employer] to fabricate a custom bivalve TLSO with copolymer external frame and flex foam inner liner. Custom TLSO is required due to patient's hips to waist ratio and large body habitus not being accommodated by an off the shelf tissue. Also an off the shelf TLSO would not provide adequate support to patient's acute compression fracture condition.     P: Patient will be seen tomorrow for fitting of custom TLSO. It is possible that the patient will have Sx tomorrow and if the patient is not ready to be fit with the TLSO, we will hold until ready to be fit.   "

## 2021-05-27 NOTE — PROGRESS NOTES
"S: Patient is a 74 y/o female, 5'4\", 190 lbs seen at [facility], room 427, for the fitting and delivery of a Maramed OTS PLS AFO, size medium for her left side on orders from Alana Lopez CNP for the treatment of Dx: left foot drop.     O: Patient presents supine in her hospital bed and seemed to not understand what was going on during the appointment today. Patient does not speak English so  phone was used during our appointment,  was attempted to relay information, but she could not understand much at all and stated that the patient was not saying anything in response. I tired to take the patient through ROM and MMT tests while the  phone was in use, but patient could not relax her foot and ankle for ROM tests and could not respond to directions for MMT tests either.     G: Patient's Maramed OTS PLS AFO will help the patient return to a energy efficient gait, restore dorsiflexion during the gait cycle and protect the patient against catching her toe during gait, preventing falls.     A: I preliminarily fit the patient with a size medium Maramed PLS AFO base on her foot size. Static and dynamic fitting could not be completed during today's appointment as the patient could not stand, much less walk to ensure proper fit. AFO was left in patient's room so if PT wishes to attempt walking with the AFO, they have an option there. Patient also did not have shoes anywhere in her hospital room as a member of hospital staff was able to check to see if there were any shoes. Patient cannot be adequately fit without shoes and she should not try to use the AFO without shoes. Patient's nurse signed off on the delivery of the AFO at the end of our appointment today.    P: I left my card in the patient's room if there are any questions regarding the fit and function of the AFO. If changes of any kind are needed for the AFO once the patient begins to walk, our office should be contacted. "

## 2021-05-27 NOTE — PROGRESS NOTES
Code Status:  DNR  Visit Type: Follow Up     Facility:  [facility] [[id number]]      Facility Type: SNF (Skilled Nursing Facility, TCU)    History of Present Illness:    Trinidad Brown is a 74 y.o. female seen today for follow-up TCU visit.  She had a hospitalization at [facility] 3/20 424/2/2019.  She has a past medical history for 4 type 2 diabetes.  She had a fall at home with persistent pain and was seen in the ER.  She had a CT of her spine which showed significant spinal stenosis of her L3 to L5-S1.  An MRI showed lumbar spondylosis with mild acute active compression fracture and moderate edema.  She did have a disc herniation and ligamentous changes on MRI.  Neurosurgery was consulted and she was taken to surgery on 3/29/19 for an L-spine decompressive laminectomy.  Her incision site was stapled closed and postop course was complicated by acute blood loss anemia with a discharge hemoglobin of 8.7 down from baseline of 14.9.  He was also treated for an E. coli UTI with 5 days of amoxicillin.  She was discharged to the TCU with orders to wear a TLSO when out of bed.  She also had significant left foot drop which per patient was chronic and an AFO was ordered.  She did show cognitive impairment however due to cultural issues they were unable to do good cognitive testing while in the hospital.  Her daughter had reported to hospital staff that her memory issues were declining the past couple months.  It was also recommended that she have osteoporosis workup as an outpatient.    Today I meet with her with a video .  She reports feeling about the same however her pain is improved with the use of as needed Dilaudid.  She uses the Dilaudid approximately 2 times a day.  Her blood sugars have been good between 89 and 188 after discontinuing the glipizide this week.  Her last day of ciprofloxacin for her UTI is 4/11 and she denies any urinary symptoms.    Past Medical History:   Diagnosis Date      Squamous cell carcinoma of back      Type 2 diabetes mellitus with hyperglycemia (H) 12/9/2015     Past Surgical History:   Procedure Laterality Date     BACK SURGERY      tumor removal     LUMBAR LAMINECTOMY Bilateral 3/29/2019    Procedure: Lumbar 3- Lumbar-4, Lumbar 4-Lumbar -5, Lumbar 5-Sacral 1,  decompressive laminectomy.;  Surgeon: Naeem Thomas MD;  Location: [facility] OR;  Service: Spine     Family History   Problem Relation Age of Onset     Stroke Daughter      Hypertension Daughter      No Medical Problems Son      Social History     Socioeconomic History     Marital status: Single     Spouse name: Not on file     Number of children: Not on file     Years of education: Not on file     Highest education level: Not on file   Occupational History     Not on file   Social Needs     Financial resource strain: Not on file     Food insecurity:     Worry: Not on file     Inability: Not on file     Transportation needs:     Medical: Not on file     Non-medical: Not on file   Tobacco Use     Smoking status: Never Smoker     Smokeless tobacco: Never Used   Substance and Sexual Activity     Alcohol use: No     Drug use: No     Sexual activity: Yes     Birth control/protection: Post-menopausal   Lifestyle     Physical activity:     Days per week: Not on file     Minutes per session: Not on file     Stress: Not on file   Relationships     Social connections:     Talks on phone: Not on file     Gets together: Not on file     Attends Bahai service: Not on file     Active member of club or organization: Not on file     Attends meetings of clubs or organizations: Not on file     Relationship status: Not on file     Intimate partner violence:     Fear of current or ex partner: Not on file     Emotionally abused: Not on file     Physically abused: Not on file     Forced sexual activity: Not on file   Other Topics Concern     Not on file   Social History Narrative     Not on file       Current Outpatient  Medications   Medication Sig Dispense Refill     atorvastatin (LIPITOR) 40 MG tablet Take 40 mg by mouth at bedtime.       calcium carbonate-vitamin D3 (CALCIUM WITH VITAMIN D) 600 mg(1,500mg) -400 unit per tablet Take 1 tablet by mouth 2 (two) times a day.       glipiZIDE (GLUCOTROL) 5 MG tablet Take 5 mg by mouth 2 (two) times a day before meals.       HYDROmorphone (DILAUDID) 2 MG tablet Take 2 mg by mouth every 3 (three) hours as needed for pain. 0.5 to 1 tab every 3 hours prn       LANTUS SOLOSTAR U-100 INSULIN 100 unit/mL (3 mL) pen Inject 45 Units under the skin at bedtime. 11.65 Type 2 with hyperglycemia  Contact provider if insulin prescribed is not the preferred insulin per insurance. 15 mL 0     metFORMIN (GLUCOPHAGE) 1000 MG tablet Take 1,000 mg by mouth 2 (two) times a day with meals.       polyethylene glycol (MIRALAX) 17 gram packet Take 1 packet (17 g total) by mouth 2 (two) times a day. 60 packet 0     senna-docusate (PERICOLACE) 8.6-50 mg tablet Take 1 tablet by mouth 2 (two) times a day. 60 tablet 0     sitaGLIPtin (JANUVIA) 100 MG tablet Take 100 mg by mouth daily.       No current facility-administered medications for this visit.      No Known Allergies  Immunization History   Administered Date(s) Administered     Influenza high dose, seasonal 03/26/2019     Influenza, inj, historic,unspecified 09/16/2015         Review of Systems   Patient denies fever, chills, headache, lightheadedness, dizziness, rhinorrhea, cough, congestion, shortness of breath, chest pain, palpitations, abdominal pain, n/v, diarrhea, constipation, change in appetite, dysuria, frequency, burning or pain with urination.  Other than stated in HPI all other review of systems is negative.         Physical Exam   Vital signs: /71, heart rate 86, respiratory rate 19, temp 97.2   GENERAL APPEARANCE: Well developed, obese woman moaning in her bed.   HEENT: normocephalic, atraumatic  PERRL, sclerae anicteric, conjunctivae  clear and moist, EOM intact  LUNGS: Lung sounds CTA, no adventitious sounds, respiratory effort normal.  CARD: RRR, S1, S2, without murmurs, gallops, rubs, no JVD,   ABD: Soft and nontender with normal bowel sounds.   MSK: Lower extremities show strength of 4/5, upper extremities show normal strength.  EXTREMITIES: No cyanosis, clubbing or edema.  Good CMS with positive pulses to lower extremities.  NEURO: Alert and oriented, face is symmetric.  Good lower extremity sensation.  Foot drop on right, able to move toes.  SKIN: Midline back lumbar surgical incision well approximated with staples, no edema, erythema, drainage.  PSYCH: euthymic            Labs:    Recent Results (from the past 240 hour(s))   POCT Glucose - 4 times daily before meals and at bedtime   Result Value Ref Range    Glucose 163 (H) 70 - 139 mg/dL   POCT Glucose - 4 times daily before meals and at bedtime   Result Value Ref Range    Glucose 160 (H) 70 - 139 mg/dL   HM2(CBC w/o Differential)   Result Value Ref Range    WBC 11.3 (H) 4.0 - 11.0 thou/uL    RBC 2.74 (L) 3.80 - 5.40 mill/uL    Hemoglobin 8.8 (L) 12.0 - 16.0 g/dL    Hematocrit 25.7 (L) 35.0 - 47.0 %    MCV 94 80 - 100 fL    MCH 32.1 27.0 - 34.0 pg    MCHC 34.2 32.0 - 36.0 g/dL    RDW 12.9 11.0 - 14.5 %    Platelets 196 140 - 440 thou/uL    MPV 9.3 8.5 - 12.5 fL   POCT Glucose - 4 times daily before meals and at bedtime   Result Value Ref Range    Glucose 245 (H) 70 - 139 mg/dL   POCT Glucose - 4 times daily before meals and at bedtime   Result Value Ref Range    Glucose 149 (H) 70 - 139 mg/dL   POCT Glucose - 4 times daily before meals and at bedtime   Result Value Ref Range    Glucose 118 70 - 139 mg/dL   POCT Glucose - 4 times daily before meals and at bedtime   Result Value Ref Range    Glucose 153 (H) 70 - 139 mg/dL   POCT Glucose - 4 times daily before meals and at bedtime   Result Value Ref Range    Glucose 91 70 - 139 mg/dL   POCT Glucose - 4 times daily before meals and at bedtime    Result Value Ref Range    Glucose 110 70 - 139 mg/dL   Urinalysis-UC if Indicated   Result Value Ref Range    Color, UA Yellow Colorless, Yellow, Straw, Light Yellow    Clarity, UA Cloudy (!) Clear    Glucose, UA Negative Negative    Bilirubin, UA Negative Negative    Ketones, UA Negative Negative    Specific Gravity, UA 1.017 1.001 - 1.030    Blood, UA Trace (!) Negative    pH, UA 6.5 4.5 - 8.0    Protein, UA Trace (!) Negative mg/dL    Urobilinogen, UA <2.0 E.U./dL <2.0 E.U./dL, 2.0 E.U./dL    Nitrite, UA Positive (!) Negative    Leukocytes, UA Large (!) Negative    Bacteria, UA Many (!) None Seen hpf    RBC, UA 0-2 None Seen, 0-2 hpf    WBC, UA >100 (!) None Seen, 0-5 hpf    Squam Epithel, UA 10-25 (!) None Seen, 0-5 lpf    Mucus, UA Moderate (!) None Seen lpf   Culture, Urine   Result Value Ref Range    Culture Mixture of urogenital organisms with     Culture >100,000 col/ml Escherichia coli (!)        Susceptibility    Escherichia coli - CR     Amoxicillin + Clavulanate <=4/2 Sensitive      Ampicillin <=4 Sensitive      Cefazolin 2 Sensitive      Cefepime <=1 Sensitive      Ceftriaxone <=1 Sensitive      Ciprofloxacin <=0.5 Sensitive      Gentamicin <=2 Sensitive      Levofloxacin <=1 Sensitive      Meropenem <=0.25 Sensitive      Nitrofurantoin 32 Sensitive      Tetracycline <=2 Sensitive      Tobramycin <=2 Sensitive      Trimethoprim + Sulfamethoxazole <=0.5 Sensitive    Hemoglobin   Result Value Ref Range    Hemoglobin 8.8 (L) 12.0 - 16.0 g/dL   Ferritin   Result Value Ref Range    Ferritin 267 (H) 10 - 130 ng/mL   Iron   Result Value Ref Range    Iron 47 42 - 175 ug/dL   Thyroid Stimulating Hormone (TSH)   Result Value Ref Range    TSH 0.26 (L) 0.30 - 5.00 uIU/mL   HM1 (CBC with Diff)   Result Value Ref Range    WBC 7.3 4.0 - 11.0 thou/uL    RBC 3.38 (L) 3.80 - 5.40 mill/uL    Hemoglobin 10.7 (L) 12.0 - 16.0 g/dL    Hematocrit 33.3 (L) 35.0 - 47.0 %    MCV 99 80 - 100 fL    MCH 31.7 27.0 - 34.0 pg     MCHC 32.1 32.0 - 36.0 g/dL    RDW 14.1 11.0 - 14.5 %    Platelets 333 140 - 440 thou/uL    MPV 8.7 8.5 - 12.5 fL    Neutrophils % 66 50 - 70 %    Lymphocytes % 26 20 - 40 %    Monocytes % 7 2 - 10 %    Eosinophils % 1 0 - 6 %    Basophils % 0 0 - 2 %    Neutrophils Absolute 4.8 2.0 - 7.7 thou/uL    Lymphocytes Absolute 1.9 0.8 - 4.4 thou/uL    Monocytes Absolute 0.5 0.0 - 0.9 thou/uL    Eosinophils Absolute 0.1 0.0 - 0.4 thou/uL    Basophils Absolute 0.0 0.0 - 0.2 thou/uL         Assessment:  1. S/P laminectomy     2. Closed compression fracture of third lumbar vertebra, sequela     3. Pain management     4. Type 2 diabetes mellitus with hyperglycemia, with long-term current use of insulin (H)     5. Acute cystitis without hematuria         Plan:   Status post laminectomy and compression fracture: Continue with therapies and wearing TLSO when out of bed.    Pain management: Continue with as needed Dilaudid we will plan to change the order on next visit to extend hours between Dilaudid dosing to 6 hours.    Diabetes: Stable will decrease Lantus insulin from 45 units to 42 units and monitor effects.    UTI: Finishing up ciprofloxacin on 4/11.  Encouraged her to drink plenty of fluids.    TSH was less than 0.5 this week and vitamin D was a unable to run due to insufficient blood.  So we will recheck both TSH and vitamin D next week.              Electronically signed by: Teri Brush CNP

## 2021-05-27 NOTE — ANESTHESIA PREPROCEDURE EVALUATION
Anesthesia Evaluation      Patient summary reviewed   No history of anesthetic complications     Airway   Mallampati: II  Neck ROM: full   Pulmonary - negative ROS and normal exam                          Cardiovascular - negative ROS and normal exam  Exercise tolerance: > or = 4 METS   Neuro/Psych    (+) dementia,     Endo/Other    (+) diabetes mellitus type 2 poorly controlled using insulin, obesity,      GI/Hepatic/Renal - negative ROS      Other findings: Cognitive status impaired, etiology unclear at this time.  Severe lumbar spinal stenosis with claudication, had fall prior to admission , possible acute on chronic condition.  MRI showed L1 fracture.  H/o squamous cell Ca on back, dental abscess, UTI.  Per chart notes the daughter states pt has been very confused and this is getting worse.  Daughter states pt is DNR.  DM very poorly controlled, HgA1c 3/26 was 11.3.  Echo 3/24/19 showed EF 60%, mild concentric thickening of LV wall, mild AI.    Hg 12.5, K 4.4, Na 138, GFR>60, coags nl. Many teeth missing, many of remainder are severely decayed and some worn to gum line.      Dental    (+) poor dentition                       Anesthesia Plan  Planned anesthetic: general endotracheal  DM very poorly controlled.  DNR status discussed with her daughter.  ASA 4   Induction: intravenous   Anesthetic plan and risks discussed with: patient and child/children    Post-op plan: routine recovery  DNR/DNI status   .  Plan is for suspension of DNR

## 2021-05-27 NOTE — PROGRESS NOTES
Code Status:  DNR  Visit Type: Follow Up     Facility:  [facility] [[id number]]      Facility Type: SNF (Skilled Nursing Facility, TCU)    History of Present Illness:   Hospital Admission Date: 3/24/2019  Hospital Discharge Date: 4/2/2019  Facility Admission Date: 4/2/2019    Trinidad Brown is a 73 y.o. female seen today as initial follow-up TCU visit following a hospitalization at [facility] 3/20 424/2/2019.  She has a past medical history for 4 type 2 diabetes.  She had a fall at home with persistent pain and was seen in the ER.  She had a CT of her spine which showed significant spinal stenosis of her L3 to L5-S1.  An MRI showed lumbar spondylosis with mild acute active compression fracture and moderate edema.  She did have a disc herniation and ligamentous changes on MRI.  Neurosurgery was consulted and she was taken to surgery on 3/29/19 for an L-spine decompressive laminectomy.  Her incision site was stapled closed and postop course was complicated by acute blood loss anemia with a discharge hemoglobin of 8.7 down from baseline of 14.9.  He was also treated for an E. coli UTI with 5 days of amoxicillin.  She was discharged to the TCU with orders to wear a TLSO when out of bed.  She also had significant left foot drop which per patient was chronic and an AFO was ordered.  She did show cognitive impairment however due to cultural issues they were unable to do good cognitive testing while in the hospital.  Her daughter had reported to hospital staff that her memory issues were declining the past couple months.  She is to follow-up with neuro surgery in 1 week for an incision check and have her staples removed in 3 weeks.  It was also recommended that she have osteoporosis workup as an outpatient.    Today, she is in significant pain however she is unable to really describe the type of pain except for its in her low back and radiating down her legs.  She takes oxycodone 5-10 mg every 3 hours and  she is unable to articulate with an  if this is helping her at all.  He did have a fall upon admission to the TCU last night with increase in pain so was sent to the ER which found no acute changes on CT and so she was discharged back to TCU.  This morning nursing reports she was agitated with their attempt to ambulate her and so she slipped to the floor without injury.  Vital signs are stable however I believe her cognitive status could be limiting her understanding in her rehabilitation.  I was present during her PT/OT evaluation and it was very difficult for her to sit up with her TLSO on as it did slip and twist.  This was removed and a tight abdominal binder was placed in order to see if she could tolerate and she was only able to stand with a 3 person assist for seconds.    Past Medical History:   Diagnosis Date     Squamous cell carcinoma of back      Type 2 diabetes mellitus with hyperglycemia (H) 12/9/2015     Past Surgical History:   Procedure Laterality Date     BACK SURGERY      tumor removal     LUMBAR LAMINECTOMY Bilateral 3/29/2019    Procedure: Lumbar 3- Lumbar-4, Lumbar 4-Lumbar -5, Lumbar 5-Sacral 1,  decompressive laminectomy.;  Surgeon: Naeem Thomas MD;  Location: [facility] OR;  Service: Spine     Family History   Problem Relation Age of Onset     Stroke Daughter      Hypertension Daughter      No Medical Problems Son      Social History     Socioeconomic History     Marital status: Single     Spouse name: Not on file     Number of children: Not on file     Years of education: Not on file     Highest education level: Not on file   Occupational History     Not on file   Social Needs     Financial resource strain: Not on file     Food insecurity:     Worry: Not on file     Inability: Not on file     Transportation needs:     Medical: Not on file     Non-medical: Not on file   Tobacco Use     Smoking status: Never Smoker     Smokeless tobacco: Never Used   Substance and  Sexual Activity     Alcohol use: No     Drug use: No     Sexual activity: Yes     Birth control/protection: Post-menopausal   Lifestyle     Physical activity:     Days per week: Not on file     Minutes per session: Not on file     Stress: Not on file   Relationships     Social connections:     Talks on phone: Not on file     Gets together: Not on file     Attends Faith service: Not on file     Active member of club or organization: Not on file     Attends meetings of clubs or organizations: Not on file     Relationship status: Not on file     Intimate partner violence:     Fear of current or ex partner: Not on file     Emotionally abused: Not on file     Physically abused: Not on file     Forced sexual activity: Not on file   Other Topics Concern     Not on file   Social History Narrative     Not on file       Current Outpatient Medications   Medication Sig Dispense Refill     HYDROmorphone (DILAUDID) 2 MG tablet Take 2 mg by mouth every 3 (three) hours as needed for pain. 0.5 to 1 tab every 3 hours prn       atorvastatin (LIPITOR) 40 MG tablet Take 40 mg by mouth at bedtime.       calcium carbonate-vitamin D3 (CALCIUM WITH VITAMIN D) 600 mg(1,500mg) -400 unit per tablet Take 1 tablet by mouth 2 (two) times a day.       glipiZIDE (GLUCOTROL) 5 MG tablet Take 5 mg by mouth 2 (two) times a day before meals.       LANTUS SOLOSTAR U-100 INSULIN 100 unit/mL (3 mL) pen Inject 45 Units under the skin at bedtime. 11.65 Type 2 with hyperglycemia  Contact provider if insulin prescribed is not the preferred insulin per insurance. 15 mL 0     metFORMIN (GLUCOPHAGE) 1000 MG tablet Take 1,000 mg by mouth 2 (two) times a day with meals.       polyethylene glycol (MIRALAX) 17 gram packet Take 1 packet (17 g total) by mouth 2 (two) times a day. 60 packet 0     senna-docusate (PERICOLACE) 8.6-50 mg tablet Take 1 tablet by mouth 2 (two) times a day. 60 tablet 0     sitaGLIPtin (JANUVIA) 100 MG tablet Take 100 mg by mouth daily.        No current facility-administered medications for this visit.      No Known Allergies  Immunization History   Administered Date(s) Administered     Influenza high dose, seasonal 03/26/2019     Influenza, inj, historic,unspecified 09/16/2015         Review of Systems   Review of systems is difficult as she is not always answering questions with the  and moaning during assessment.  She does not answer many of the questions except the ones related to her back pain.    Physical Exam   Vital signs: /64, heart rate 98, respiratory 20, temp 98.4, weight 189 pounds, blood sugars are good in the 120-150s.  GENERAL APPEARANCE: Well developed, obese woman moaning in her bed.   HEENT: normocephalic, atraumatic  PERRL, sclerae anicteric, conjunctivae clear and moist, EOM intact  LUNGS: Lung sounds CTA, no adventitious sounds, respiratory effort normal.  CARD: RRR, S1, S2, without murmurs, gallops, rubs, no JVD,   ABD: Soft and nontender with normal bowel sounds.   MSK: Lower extremities show strength of 4/5, upper extremities show normal strength.  EXTREMITIES: No cyanosis, clubbing or edema.  NEURO: Alert and tenable cognitive impairment. Face is symmetric.  Good lower extremity sensation.  Foot drop.  Able to move toes.  SKIN: Inspection of the skin reveals no rashes, ulcerations or petechiae.  PSYCH: euthymic            Labs:    Recent Results (from the past 240 hour(s))   POCT Glucose   Result Value Ref Range    Glucose 310 (H) 70 - 139 mg/dL   POCT Glucose   Result Value Ref Range    Glucose 144 (H) 70 - 139 mg/dL   Basic Metabolic Panel   Result Value Ref Range    Sodium 134 (L) 136 - 145 mmol/L    Potassium 3.7 3.5 - 5.0 mmol/L    Chloride 104 98 - 107 mmol/L    CO2 21 (L) 22 - 31 mmol/L    Anion Gap, Calculation 9 5 - 18 mmol/L    Glucose 293 (H) 70 - 125 mg/dL    Calcium 8.6 8.5 - 10.5 mg/dL    BUN 7 (L) 8 - 28 mg/dL    Creatinine 0.73 0.60 - 1.10 mg/dL    GFR MDRD Af Amer >60 >60 mL/min/1.73m2     GFR MDRD Non Af Amer >60 >60 mL/min/1.73m2   HM1 (CBC with Diff)   Result Value Ref Range    WBC 5.9 4.0 - 11.0 thou/uL    RBC 4.09 3.80 - 5.40 mill/uL    Hemoglobin 13.0 12.0 - 16.0 g/dL    Hematocrit 38.4 35.0 - 47.0 %    MCV 94 80 - 100 fL    MCH 31.8 27.0 - 34.0 pg    MCHC 33.9 32.0 - 36.0 g/dL    RDW 12.0 11.0 - 14.5 %    Platelets 181 140 - 440 thou/uL    MPV 9.2 8.5 - 12.5 fL    Neutrophils % 51 50 - 70 %    Lymphocytes % 36 20 - 40 %    Monocytes % 11 (H) 2 - 10 %    Eosinophils % 2 0 - 6 %    Basophils % 0 0 - 2 %    Neutrophils Absolute 3.0 2.0 - 7.7 thou/uL    Lymphocytes Absolute 2.1 0.8 - 4.4 thou/uL    Monocytes Absolute 0.6 0.0 - 0.9 thou/uL    Eosinophils Absolute 0.1 0.0 - 0.4 thou/uL    Basophils Absolute 0.0 0.0 - 0.2 thou/uL   POCT Glucose - 4 times daily before meals and at bedtime   Result Value Ref Range    Glucose 262 (H) 70 - 139 mg/dL   Echo Complete   Result Value Ref Range    LV volume diastolic 32.1 (!) 46 - 106 cm3    LV volume systolic 10.8 (!) 14 - 42 cm3    HR 71 bpm    IVSd 1.21 (!) 0.6 - 0.9 cm    LVIDd 4.4 3.8 - 5.2 cm    LVIDs 2.83 2.2 - 3.5 cm    LVOT diam 2.1 cm    LVOT mean gradient 4 mmHg    LVOT peak VTI 28.5 cm    LVOT mean court 90.5 cm/s    LVOT peak court 123 cm/s    LVOT peak gradient 6 mmHg    LV PWd 1.27 (!) 0.6 - 0.9 cm    MV E' lat court 5 cm/s    MV E' med court 5.33 cm/s    AR decel slope 2,710 mm/s2    AR p 1/2 time 658 ms    AR peak court 440 cm/s    AV peak court 172 cm/s    AV cusp sep 1.5 cm    AV cusp sep 1.5 cm    AV mean court 119 cm/s    AV mean gradient 6 mmHg    AV VTI 34.1 cm    AO root 3 cm    LA size 4 cm    MV decel slope 4,440 mm/s2    MV decel time 208 ms    MV P 1/2 time 64 ms    MV peak A court 114 cm/s    MV peak E court 82.5 cm/s    MV mean court 70.1 cm/s    MV mean gradient 2 mmHg    MV VTI 30.8 cm    MV peak velocityoctiy 121 cm/s    TR peak court 286 cm/s    BSA 2.01 m2    Hieght 64 in    Weight 3,054.4 lbs    /75 mmHg    IVS/PW ratio 1.0     TR peak  gradent 32.7 mmHg    LV FS 35.7 28 - 44 %    Echo LVEF calculated 66 55 - 75 %    LA volume 44.5 mL    LV mass 200.7 g    AV area 2.9 cm2    AV DIM IND court 0.7     MV area p 1/2 time 3.4 cm2    MV area cont eq 3.2 cm2    MV E/A Ratio 0.7     LVOT area 3.46 cm2    LVOT SV 98.7 cm3    AV peak gradient 11.8 mmHg    MV peak gradient 5.9 mmHg    LV systolic volume index 5.4 8 - 24 cm3/m2    LV diastolic volume index 16.0 29 - 61 cm3/m2    LA volume index 22.2 mL/m2    LV mass index 99.8 g/m2    LV SVi 49.1 ml/m2    TAPSE 1.7 cm    MV med E/e' ratio 15.5     MV lat E/e' ratio 16.5     LV CO 7.0 l/min    LV Ci 3.5 l/min/m2    LA area 2 15.3 cm2    LA area 1 16.1 cm2    Height 64.0 in    Weight 191 lbs    MV Avg E/e' Ratio 16.0 cm/s    LA length 4.7 cm    AR peak gradient 77.4 mmHg    AV DIM IND VTI 0.8     MVA VTI 3.20 cm2   POCT Glucose - 4 times daily before meals and at bedtime   Result Value Ref Range    Glucose 276 (H) 70 - 139 mg/dL   POCT Glucose - 4 times daily before meals and at bedtime   Result Value Ref Range    Glucose 166 (H) 70 - 139 mg/dL   POCT Glucose - 4 times daily before meals and at bedtime   Result Value Ref Range    Glucose 273 (H) 70 - 139 mg/dL   Basic Metabolic Panel   Result Value Ref Range    Sodium 138 136 - 145 mmol/L    Potassium 4.4 3.5 - 5.0 mmol/L    Chloride 106 98 - 107 mmol/L    CO2 25 22 - 31 mmol/L    Anion Gap, Calculation 7 5 - 18 mmol/L    Glucose 199 (H) 70 - 125 mg/dL    Calcium 8.9 8.5 - 10.5 mg/dL    BUN 6 (L) 8 - 28 mg/dL    Creatinine 0.70 0.60 - 1.10 mg/dL    GFR MDRD Af Amer >60 >60 mL/min/1.73m2    GFR MDRD Non Af Amer >60 >60 mL/min/1.73m2   Glycosylated Hemoglobin A1C   Result Value Ref Range    Hemoglobin A1c 11.3 (H) 4.2 - 6.1 %   INR   Result Value Ref Range    INR 1.04 0.90 - 1.10   APTT(PTT)   Result Value Ref Range    PTT 29 24 - 37 seconds   Platelet Function Test   Result Value Ref Range    PFA-COL/ 1 - 180 sec   Platelet Count - every other day x 3    Result Value Ref Range    Platelets 175 140 - 440 thou/uL   Type and Screen   Result Value Ref Range    ABORh O POS     Antibody Screen Negative Negative   POCT Glucose - 4 times daily before meals and at bedtime   Result Value Ref Range    Glucose 217 (H) 70 - 139 mg/dL   POCT Glucose   Result Value Ref Range    Glucose 193 (H) 70 - 139 mg/dL   POCT Glucose - 4 times daily before meals and at bedtime   Result Value Ref Range    Glucose 239 (H) 70 - 139 mg/dL   POCT Glucose - 4 times daily before meals and at bedtime   Result Value Ref Range    Glucose 154 (H) 70 - 139 mg/dL   POCT Glucose   Result Value Ref Range    Glucose 295 (H) 70 - 139 mg/dL   POCT Glucose   Result Value Ref Range    Glucose 198 (H) 70 - 139 mg/dL   HM2(CBC w/o Differential)   Result Value Ref Range    WBC 5.6 4.0 - 11.0 thou/uL    RBC 3.92 3.80 - 5.40 mill/uL    Hemoglobin 12.5 12.0 - 16.0 g/dL    Hematocrit 37.5 35.0 - 47.0 %    MCV 96 80 - 100 fL    MCH 31.9 27.0 - 34.0 pg    MCHC 33.3 32.0 - 36.0 g/dL    RDW 11.9 11.0 - 14.5 %    Platelets 184 140 - 440 thou/uL    MPV 9.2 8.5 - 12.5 fL   POCT Glucose   Result Value Ref Range    Glucose 193 (H) 70 - 139 mg/dL   POCT Glucose   Result Value Ref Range    Glucose 206 (H) 70 - 139 mg/dL   POCT Glucose - 4 times daily before meals and at bedtime   Result Value Ref Range    Glucose 264 (H) 70 - 139 mg/dL   POCT Glucose - 4 times daily before meals and at bedtime   Result Value Ref Range    Glucose 97 70 - 139 mg/dL   POCT Glucose - 4 times daily before meals and at bedtime   Result Value Ref Range    Glucose 203 (H) 70 - 139 mg/dL   POCT Glucose   Result Value Ref Range    Glucose 202 (H) 70 - 139 mg/dL   Basic Metabolic Panel   Result Value Ref Range    Sodium 138 136 - 145 mmol/L    Potassium 4.0 3.5 - 5.0 mmol/L    Chloride 107 98 - 107 mmol/L    CO2 23 22 - 31 mmol/L    Anion Gap, Calculation 8 5 - 18 mmol/L    Glucose 163 (H) 70 - 125 mg/dL    Calcium 8.6 8.5 - 10.5 mg/dL    BUN 8 8 - 28  mg/dL    Creatinine 0.66 0.60 - 1.10 mg/dL    GFR MDRD Af Amer >60 >60 mL/min/1.73m2    GFR MDRD Non Af Amer >60 >60 mL/min/1.73m2   HM2(CBC w/o Differential)   Result Value Ref Range    WBC 4.7 4.0 - 11.0 thou/uL    RBC 4.00 3.80 - 5.40 mill/uL    Hemoglobin 12.7 12.0 - 16.0 g/dL    Hematocrit 37.0 35.0 - 47.0 %    MCV 93 80 - 100 fL    MCH 31.8 27.0 - 34.0 pg    MCHC 34.3 32.0 - 36.0 g/dL    RDW 11.9 11.0 - 14.5 %    Platelets 193 140 - 440 thou/uL    MPV 8.7 8.5 - 12.5 fL   POCT Glucose   Result Value Ref Range    Glucose 156 (H) 70 - 139 mg/dL   POCT Glucose   Result Value Ref Range    Glucose 157 (H) 70 - 139 mg/dL   POCT Glucose - 4 times daily before meals and at bedtime   Result Value Ref Range    Glucose 241 (H) 70 - 139 mg/dL   POCT Glucose - 4 times daily before meals and at bedtime   Result Value Ref Range    Glucose 231 (H) 70 - 139 mg/dL   POCT Glucose - 4 times daily before meals and at bedtime   Result Value Ref Range    Glucose 65 (L) 70 - 139 mg/dL   POCT Glucose   Result Value Ref Range    Glucose 149 (H) 70 - 139 mg/dL   POCT Glucose   Result Value Ref Range    Glucose 77 70 - 139 mg/dL   POCT Glucose   Result Value Ref Range    Glucose 113 70 - 139 mg/dL   POCT Glucose   Result Value Ref Range    Glucose 162 (H) 70 - 139 mg/dL   HM2(CBC w/o Differential)   Result Value Ref Range    WBC 6.3 4.0 - 11.0 thou/uL    RBC 3.67 (L) 3.80 - 5.40 mill/uL    Hemoglobin 11.6 (L) 12.0 - 16.0 g/dL    Hematocrit 34.8 (L) 35.0 - 47.0 %    MCV 95 80 - 100 fL    MCH 31.6 27.0 - 34.0 pg    MCHC 33.3 32.0 - 36.0 g/dL    RDW 12.1 11.0 - 14.5 %    Platelets 220 140 - 440 thou/uL    MPV 8.9 8.5 - 12.5 fL   Glucose   Result Value Ref Range    Glucose 89 70 - 125 mg/dL    Patient Fasting > 8hrs? Unknown    POCT Glucose - 4 times daily before meals and at bedtime   Result Value Ref Range    Glucose 112 70 - 139 mg/dL   POCT Glucose - 4 times daily before meals and at bedtime   Result Value Ref Range    Glucose 178 (H) 70  - 139 mg/dL   POCT Glucose - 4 times daily before meals and at bedtime   Result Value Ref Range    Glucose 243 (H) 70 - 139 mg/dL   Hemoglobin   Result Value Ref Range    Hemoglobin 9.9 (L) 12.0 - 16.0 g/dL   Platelet Count - every other day x 3   Result Value Ref Range    Platelets 191 140 - 440 thou/uL   POCT Glucose - 4 times daily before meals and at bedtime   Result Value Ref Range    Glucose 424 (H) 70 - 139 mg/dL   Glucose, Random   Result Value Ref Range    Glucose 416 (HH) 70 - 125 mg/dL    Patient Fasting > 8hrs? Unknown    POCT Glucose - 4 times daily before meals and at bedtime   Result Value Ref Range    Glucose 385 (H) 70 - 139 mg/dL   POCT Glucose - 4 times daily before meals and at bedtime   Result Value Ref Range    Glucose 435 (H) 70 - 139 mg/dL   POCT Glucose - 4 times daily before meals and at bedtime   Result Value Ref Range    Glucose 283 (H) 70 - 139 mg/dL   HM2(CBC w/o Differential)   Result Value Ref Range    WBC 13.1 (H) 4.0 - 11.0 thou/uL    RBC 2.75 (L) 3.80 - 5.40 mill/uL    Hemoglobin 8.7 (L) 12.0 - 16.0 g/dL    Hematocrit 26.1 (L) 35.0 - 47.0 %    MCV 95 80 - 100 fL    MCH 31.6 27.0 - 34.0 pg    MCHC 33.3 32.0 - 36.0 g/dL    RDW 12.4 11.0 - 14.5 %    Platelets 192 140 - 440 thou/uL    MPV 8.7 8.5 - 12.5 fL   Basic Metabolic Panel   Result Value Ref Range    Sodium 140 136 - 145 mmol/L    Potassium 3.7 3.5 - 5.0 mmol/L    Chloride 109 (H) 98 - 107 mmol/L    CO2 27 22 - 31 mmol/L    Anion Gap, Calculation 4 (L) 5 - 18 mmol/L    Glucose 126 (H) 70 - 125 mg/dL    Calcium 8.4 (L) 8.5 - 10.5 mg/dL    BUN 11 8 - 28 mg/dL    Creatinine 0.61 0.60 - 1.10 mg/dL    GFR MDRD Af Amer >60 >60 mL/min/1.73m2    GFR MDRD Non Af Amer >60 >60 mL/min/1.73m2   POCT Glucose - 4 times daily before meals and at bedtime   Result Value Ref Range    Glucose 148 (H) 70 - 139 mg/dL   POCT Glucose - 4 times daily before meals and at bedtime   Result Value Ref Range    Glucose 148 (H) 70 - 139 mg/dL   POCT Glucose -  4 times daily before meals and at bedtime   Result Value Ref Range    Glucose 163 (H) 70 - 139 mg/dL   POCT Glucose - 4 times daily before meals and at bedtime   Result Value Ref Range    Glucose 160 (H) 70 - 139 mg/dL   HM2(CBC w/o Differential)   Result Value Ref Range    WBC 11.3 (H) 4.0 - 11.0 thou/uL    RBC 2.74 (L) 3.80 - 5.40 mill/uL    Hemoglobin 8.8 (L) 12.0 - 16.0 g/dL    Hematocrit 25.7 (L) 35.0 - 47.0 %    MCV 94 80 - 100 fL    MCH 32.1 27.0 - 34.0 pg    MCHC 34.2 32.0 - 36.0 g/dL    RDW 12.9 11.0 - 14.5 %    Platelets 196 140 - 440 thou/uL    MPV 9.3 8.5 - 12.5 fL   POCT Glucose - 4 times daily before meals and at bedtime   Result Value Ref Range    Glucose 245 (H) 70 - 139 mg/dL   POCT Glucose - 4 times daily before meals and at bedtime   Result Value Ref Range    Glucose 149 (H) 70 - 139 mg/dL   POCT Glucose - 4 times daily before meals and at bedtime   Result Value Ref Range    Glucose 118 70 - 139 mg/dL   POCT Glucose - 4 times daily before meals and at bedtime   Result Value Ref Range    Glucose 153 (H) 70 - 139 mg/dL   POCT Glucose - 4 times daily before meals and at bedtime   Result Value Ref Range    Glucose 91 70 - 139 mg/dL   POCT Glucose - 4 times daily before meals and at bedtime   Result Value Ref Range    Glucose 110 70 - 139 mg/dL         Assessment:  1. S/P laminectomy     2. Closed compression fracture of third lumbar vertebra, sequela     3. Pain management     4. Type 2 diabetes mellitus with hyperglycemia, with long-term current use of insulin (H)         Plan:   For her compression fracture and status post laminectomy: At this point therapy and mobility will be very difficult as she is unable to tolerate the TLSO due to poor fitting.  Will have [employer] orthotics come out and trim TLSO as so it fits her short stature better.  Continue with therapy as able.  Did discuss this plan with neurosurgery nurse practitioner and she agrees.  Follow-up with neurosurgery next week for an  incision check.    Pain management: I am going to switch her oxycodone to Dilaudid at this time in hopes of better coverage for her pain.  I will continue to assess her and if pain lightens enough may be able to discuss what she is feeling better.  Could consider a Medrol Dosepak for radiculopathy if continues to have increased pain.    Diabetes: We will continue to monitor blood sugars they are stable at this time.  Continue with glipizide 5 mg twice daily Januvia 100 mg daily, and metformin 1000 mg twice daily.  She is on atorvastatin 40 mg for lipidemia.    Total  35 minutes of which 30 minutes was spent with patient, staff and neurosurgery in coordination of clarification of discharge orders, TLSO adjustments, pain management and plan.      Electronically signed by: Teri Brush CNP

## 2021-05-27 NOTE — PROGRESS NOTES
"S: Patient is a 72 y/o female 5'4\", 190 lbs seen at [facility] for an adjustment to her custom TLSO. Staff members reported to me that they did not know how to properly don the TLSO and that this was the only current issues with the patient's orthosis.     O: Patient presented supine in her hospital bed. All communication with patient was accomplished with electronic  services. Patient was wearing a thick sweatshirt and was the only clothes she had with her at the care facility. I explained to staff that the patient needs to be brought a some kind of thin top and the thickness of a sweatshirt will cause difficulty in donning, allow the TLSO to improperly shift, and could lead to potential pressure gradient within the TLSO that could cause skin issues.     A: I fit the patient's custom TLSO with the aid of one staff member and taught three staff member how to properly log-roll the patient and determine if the custom TLSO is properly donned on the patient. After donning was complete to ensure that the TLSO was in the correct position with the internal foam waist pad correctly positioned just about the iliac crest of the patient, fit of TLSO was as intended. I then discussed with the patient that the TLSO must be worn tight enough or it will not be able to properly support the patient's spinal fractures. Patient reported through the  that she needed the TLSO loosened in order to breathe, which was done immediately. Patient then reported that she felt pain all over. I attempted to ask the patient if she had felt this pain before she was wearing the TLSO or the pain occurred when she was wearing the TLSO. At that time the patient no longer answered any of the translators questions. Several attempts were made to communicate with the patient at this point of time and no responses were given. Nursing staff member guessed that she probably felt generalized pain due to a recent fall out of bed a " the facility. Patient remained unresponsive at attempts to communicate. Patient's TLSO was then removed as she was supine in her bed and did not need to wear her TLSO at this time.     P: I asked nursing staff to call our office if there are any further issues with the fit and function of the patient's custom TLSO.

## 2021-05-27 NOTE — TELEPHONE ENCOUNTER
PATIENT NAME:  Trinidad Brown  YOB: 1945  MRN: [id number]  SURGEON: DR. MERINO  DATE of SURGERY: 3-29-19  PROCEDURE: Lumbar 3- Lumbar-4, Lumbar 4-Lumbar -5, Lumbar 5-Sacral 1,  decompressive laminectomy.    FOLLOW-UP:  Wound Check:  7-10 days [On nurse schedule unless noted otherwise]  Staples Out : 21 Days [On nurse schedule unless noted otherwise]  Post Op Visit: 6+ weeks   Post-op Provider: NP  DIAGNOSTICS:  NONE  DISPOSITION:  LIKELY DISCHARGE TO TCU ?[facility] [place]?     ADDITIONAL INSTRUCTIONS FOR MEDICAL STAFF:    Needs to f/u with PCP

## 2021-05-28 NOTE — PROGRESS NOTES
"NEUROSURGERY FOLLOW UP EVALUATION:    ASSESSMENT/ PLAN:  Trinidad Brown is a 74 y.o. female, who presents today status post Lumbar 3- Lumbar-4, Lumbar 4-Lumbar -5, Lumbar 5-Sacral 1, decompressive laminectomy for spinal stenosis by Dr Thomas on 3/29/2019. Patient also being treated in TLSO brace for L1 compression fracture she sustained around 3/24/19 during a fall. She was last seen 4/19/19 and she was not wearing a brace.     1.  Fracture is NOT healed. Wear the brace when upright.  2.  Continue physical therapy.  3.  Follow up in 4 weeks with Lumbar spine X-rays.   4.  Needs osteoporosis work up and treatment    Today she reports no back pain. She's not wearing her brace \"they never give it to me\"    EXAM:    Alert and oriented x3, speech appropriate via   Arm strength: 5/5  Leg strength: 5/5   Bulk and tone: normal  Gait she's mostly wheelchair bound       IMAGING:  The imaging was personally reviewed by me.   L1 superior endplate compression deformity with approximately 30% height loss is unchanged from the prior radiograph and mildly worsened compared to the prior CT.     Remaining vertebral body heights are maintained. Unchanged lumbar alignment with lumbar dextro curvature, thoracolumbar lordosis, and mild retrolisthesis at L3-L4. Unchanged multilevel lumbar spondylosis.       Stephany Hough NP  [facility]  "

## 2021-05-28 NOTE — PROGRESS NOTES
Code Status:  DNR  Visit Type: Follow Up     Facility:  [facility] [[id number]]      Facility Type: SNF (Skilled Nursing Facility, TCU)    History of Present Illness:    Trinidad Brown is a 74 y.o. female was seen today for follow-up TCU visit.  She had a hospitalization at [facility] 3/20 424/2/2019.  She has a past medical history for 4 type 2 diabetes.  She had a fall at home with persistent pain and was seen in the ER.  She had a CT of her spine which showed significant spinal stenosis of her L3 to L5-S1.  An MRI showed lumbar spondylosis with mild acute active compression fracture and moderate edema.  She did have a disc herniation and ligamentous changes on MRI.  Neurosurgery was consulted and she was taken to surgery on 3/29/19 for an L-spine decompressive laminectomy.  Her incision site was stapled closed and postop course was complicated by acute blood loss anemia with a discharge hemoglobin of 8.7 down from baseline of 14.9.  He was also treated for an E. coli UTI with 5 days of amoxicillin.  She was discharged to the TCU with orders to wear a TLSO when out of bed.  She also had significant left foot drop which per patient was chronic and an AFO was ordered.  She did show cognitive impairment however due to cultural issues they were unable to do good cognitive testing while in the hospital.  Her daughter had reported to hospital staff that her memory issues were declining the past couple months.  It was also recommended that she have osteoporosis workup as an outpatient.    Today, I examine the patient with a video .  She denies any discomfort or complaints.  She has had a 12lb weight loss and when asked she states she just doesn't feel like eating.  She says she likes the food just fine but does not have the appetite.  Her BS are trending down.  Staff tell me she had a fall in the bathroom yesterday after her and her daughter tried to transfer her to the bathroom without  requesting staff help.  I discussed with her and she states she was not injured and has no residual discomforts today.  I counseled her on being sure to ask for staff help as she is not strong enough to transfer herself.  PT states she will be getting her AFO braces today which will hopefully continue her functional progress.       Current Outpatient Medications   Medication Sig Dispense Refill     acetaminophen (TYLENOL) 325 MG tablet Take 650 mg by mouth every 6 (six) hours as needed for pain.       atorvastatin (LIPITOR) 40 MG tablet Take 40 mg by mouth at bedtime.       calcium carbonate-vitamin D3 (CALCIUM WITH VITAMIN D) 600 mg(1,500mg) -400 unit per tablet Take 1 tablet by mouth 2 (two) times a day.       LANTUS SOLOSTAR U-100 INSULIN 100 unit/mL (3 mL) pen Inject 45 Units under the skin at bedtime. 11.65 Type 2 with hyperglycemia  Contact provider if insulin prescribed is not the preferred insulin per insurance. (Patient taking differently: Inject 32 Units under the skin at bedtime. 11.65 Type 2 with hyperglycemia  Contact provider if insulin prescribed is not the preferred insulin per insurance.      ) 15 mL 0     metFORMIN (GLUCOPHAGE) 1000 MG tablet Take 1,000 mg by mouth 2 (two) times a day with meals.       sitaGLIPtin (JANUVIA) 100 MG tablet Take 100 mg by mouth daily.       No current facility-administered medications for this visit.      No Known Allergies  Immunization History   Administered Date(s) Administered     Influenza high dose, seasonal 03/26/2019     Influenza, inj, historic,unspecified 09/16/2015         Review of Systems   Patient denies fever, chills, headache, lightheadedness, dizziness, rhinorrhea, cough, congestion, shortness of breath, chest pain, palpitations, abdominal pain, n/v, diarrhea, constipation, change in appetite, dysuria, frequency, burning or pain with urination.  Other than stated in HPI all other review of systems is negative.       Physical Exam   Vital signs:  /63, HR 89, resp 20, temp 97.6    GENERAL APPEARANCE: Well developed, obese woman in no acute distress  HEENT: normocephalic, atraumatic  PERRL, sclerae anicteric, conjunctivae clear and moist, EOM intact  LUNGS: lungs CTA, respiratory effort normal.  CARD: RRR, S1, S2, without murmurs, gallops, rubs,   Abdomen: soft, non-distended, non-tender, +BS x 4   MSK: foot drop right foot  EXTREMITIES: No cyanosis, clubbing or edema.   No s/s of DVT  NEURO: Alert and oriented, face is symmetric.  Good lower extremity sensation.   SKIN: no new lesions, rashes or petechia   PSYCH: euthymic            Labs:    No results found for this or any previous visit (from the past 240 hour(s)).        Assessment:  1. S/P laminectomy     2. Closed compression fracture of third lumbar vertebra, sequela     3. Type 2 diabetes mellitus with hyperglycemia, with long-term current use of insulin (H)     4. Bilateral foot-drop     5. Falls frequently         Plan:   She will need to continue with therapy in order to increase her function for progression towards discharge.  Awaiting AFOs.  She has had 4 falls since her admission.  I counseled her on the risk of falls with her proposed osteoporosis and fractures.  I instructed her to be sure to call for help when needed.  She agreed. Her lack of appetite is concerning as it is affecting her weight and blood sugars.  I will have dietician consult for food options and calorie counting.  I will order dietary supplements two times a day and decreased her Lantus to 32u at HS.                  Electronically signed by: Teri Brush CNP

## 2021-05-28 NOTE — PROGRESS NOTES
"Code Status:  DNR  Visit Type: Follow Up     Facility:  [facility] [[id number]]      Facility Type: SNF (Skilled Nursing Facility, TCU)    History of Present Illness:    Trinidad Brown is a 74 y.o. female was seen today for follow-up TCU visit.  She had a hospitalization at [facility] 3/20 424/2/2019.  She has a past medical history for 4 type 2 diabetes.  She had a fall at home with persistent pain and was seen in the ER.  She had a CT of her spine which showed significant spinal stenosis of her L3 to L5-S1.  An MRI showed lumbar spondylosis with mild acute active compression fracture and moderate edema.  She did have a disc herniation and ligamentous changes on MRI.  Neurosurgery was consulted and she was taken to surgery on 3/29/19 for an L-spine decompressive laminectomy.  Her incision site was stapled closed and postop course was complicated by acute blood loss anemia with a discharge hemoglobin of 8.7 down from baseline of 14.9.  He was also treated for an E. coli UTI with 5 days of amoxicillin.  She was discharged to the TCU with orders to wear a TLSO when out of bed.  She also had significant left foot drop which per patient was chronic and an AFO was ordered.  She did show cognitive impairment however due to cultural issues they were unable to do good cognitive testing while in the hospital.  Her daughter had reported to hospital staff that her memory issues were declining the past couple months.  It was also recommended that she have osteoporosis workup as an outpatient.    Today, I examine the patient with a video .  She denies any discomfort.  She continues to report ongoing \"numbness\" of her lower extremities.  She has gotten her custom AFOs which helps with transfers.  Therapy tells me she has maximized her progress and most likely would be transferring to home next week with family support.  She complains of having to wear the TLSO and is not able to tolerate it fitted " tightly as it causes her breathing difficulties.  She reports she is eating better this week and has been drinking the Glucerna drinks.  She has shown another 1 pound weight loss with a total of 13 pounds down.  Her blood sugars are well maintained on the current dose of Lantus at 34 units, metformin and sitagliptin.  Her blood sugars range from 90s to 160s.    Current Outpatient Medications   Medication Sig Dispense Refill     calcium, as carbonate, (TUMS) 200 mg calcium (500 mg) chewable tablet Chew 2 tablets daily.       insulin glargine (LANTUS) 100 unit/mL injection Inject 34 Units under the skin at bedtime.       acetaminophen (TYLENOL) 325 MG tablet Take 650 mg by mouth every 6 (six) hours as needed for pain.       alendronate (FOSAMAX) 10 MG tablet Take 70 mg by mouth once a week. Take in the morning on an empty stomach with a full glass of water 30 minutes before food       atorvastatin (LIPITOR) 40 MG tablet Take 40 mg by mouth at bedtime.       cholecalciferol, vitamin D3, 1,000 unit tablet Take 2,000 Units by mouth daily.              LANTUS SOLOSTAR U-100 INSULIN 100 unit/mL (3 mL) pen Inject 45 Units under the skin at bedtime. 11.65 Type 2 with hyperglycemia  Contact provider if insulin prescribed is not the preferred insulin per insurance. (Patient taking differently: Inject 34 Units under the skin at bedtime. 11.65 Type 2 with hyperglycemia  Contact provider if insulin prescribed is not the preferred insulin per insurance.      ) 15 mL 0     metFORMIN (GLUCOPHAGE) 1000 MG tablet Take 1,000 mg by mouth 2 (two) times a day with meals.       polyethylene glycol (MIRALAX) 17 gram packet Take 17 g by mouth daily.       senna-docusate (PERICOLACE) 8.6-50 mg tablet Take 1 tablet by mouth daily.       sitaGLIPtin (JANUVIA) 100 MG tablet Take 100 mg by mouth daily.       No current facility-administered medications for this visit.      No Known Allergies  Immunization History   Administered Date(s)  Administered     Influenza high dose, seasonal 03/26/2019     Influenza, inj, historic,unspecified 09/16/2015         Review of Systems   Patient denies fever, chills, headache, lightheadedness, dizziness, rhinorrhea, cough, congestion, shortness of breath, chest pain, palpitations, abdominal pain, n/v, diarrhea, constipation, change in appetite, dysuria, frequency, burning or pain with urination.  Other than stated in HPI all other review of systems is negative.       Physical Exam   Vital signs: /72, heart rate 86, respiratory 18, temp 97.5   GENERAL APPEARANCE: Well developed, obese woman in no acute distress  HEENT: normocephalic, atraumatic  PERRL, sclerae anicteric, conjunctivae clear and moist, EOM intact  LUNGS: lungs CTA, respiratory effort normal.  CARD: RRR, S1, S2, without murmurs, gallops, rubs,   Abdomen: soft, non-distended, non-tender, +BS x 4   MSK: foot drop right foot  EXTREMITIES: No cyanosis, clubbing or edema.   No s/s of DVT  NEURO: Alert and oriented, face is symmetric.  Good lower extremity sensation.   SKIN: no new lesions, rashes or petechia   PSYCH: euthymic            Labs:    Recent Results (from the past 240 hour(s))   Vitamin D, Total (25-Hydroxy)   Result Value Ref Range    Vitamin D, Total (25-Hydroxy) 34.4 30.0 - 80.0 ng/mL           Assessment:  1. S/P laminectomy     2. Closed compression fracture of third lumbar vertebra, sequela     3. Type 2 diabetes mellitus with hyperglycemia, with long-term current use of insulin (H)     4. Age-related osteoporosis with current pathological fracture with routine healing, subsequent encounter     5. Vitamin D deficiency         Plan:   Close compression fracture: I counseled her family on neurosurgery continuing to recommend the TLSO for another 3 more weeks until x-rays.  I will have [facility] come out and see if there is any adjustments or recommendations they can make in order to help her have support and  tolerate.     Diabetes: Stable at this time we will continue Lantus at 34 units, sitagliptin and metformin at current doses.  Continue with Glucerna for supplementation.  I counseled patient on rationale for Glucerna in order to continue to stabilize her weight and blood sugars.    Osteoporosis: Continue on alendronate as she is tolerating and continue with calcium supplements.  Counseled family on risks of osteoporosis and her need for a bone scan when she is able to  clinic.  Also counseled them on the use of alendronate and calcium to decrease risk of future fractures.    Vitamin D deficiency: Continue on 2000 international units of vitamin D and will need a recheck of levels in a month.      35 total minutes spent with 20 minutes spent face-to-face with patient and family in coordination and counseling of the above plan of care.  Coordination is in efforts to maximize her transition plan of going home with therapy services.          Electronically signed by: Teri Brush CNP

## 2021-05-28 NOTE — PROGRESS NOTES
Code Status:  DNR  Visit Type: Follow Up     Facility:  [facility] [[id number]]      Facility Type: SNF (Skilled Nursing Facility, TCU)    History of Present Illness:    Trinidad Brown is a 74 y.o. female was seen today for follow-up TCU visit.  She had a hospitalization at [facility] 3/20 424/2/2019.  She has a past medical history for 4 type 2 diabetes.  She had a fall at home with persistent pain and was seen in the ER.  She had a CT of her spine which showed significant spinal stenosis of her L3 to L5-S1.  An MRI showed lumbar spondylosis with mild acute active compression fracture and moderate edema.  She did have a disc herniation and ligamentous changes on MRI.  Neurosurgery was consulted and she was taken to surgery on 3/29/19 for an L-spine decompressive laminectomy.  Her incision site was stapled closed and postop course was complicated by acute blood loss anemia with a discharge hemoglobin of 8.7 down from baseline of 14.9.  He was also treated for an E. coli UTI with 5 days of amoxicillin.  She was discharged to the TCU with orders to wear a TLSO when out of bed.  She also had significant left foot drop which per patient was chronic and an AFO was ordered.  She did show cognitive impairment however due to cultural issues they were unable to do good cognitive testing while in the hospital.  Her daughter had reported to hospital staff that her memory issues were declining the past couple months.  It was also recommended that she have osteoporosis workup as an outpatient.    Today, I examine the patient with a video .  She denies any discomfort or complaints.  The diabetic oral supplements have not come however nursing reports she is eating better this week.  Her BS are trending up in the 200s.  She was seen by neurosurgery yesterday and her vertebral fracture is not healed and they ordered to continue with TLSO x 4 more weeks.  Patient is very disheartened by this as she feels  she is not getting better.  She still not have the AFOs that were suppose to come last week so her therapy has been quite delayed and poor progress due to restriction of TLSO and not have the AFOs.      Current Outpatient Medications   Medication Sig Dispense Refill     alendronate (FOSAMAX) 10 MG tablet Take 70 mg by mouth once a week. Take in the morning on an empty stomach with a full glass of water 30 minutes before food       acetaminophen (TYLENOL) 325 MG tablet Take 650 mg by mouth every 6 (six) hours as needed for pain.       atorvastatin (LIPITOR) 40 MG tablet Take 40 mg by mouth at bedtime.       calcium carbonate-vitamin D3 (CALCIUM WITH VITAMIN D) 600 mg(1,500mg) -400 unit per tablet Take 1 tablet by mouth 2 (two) times a day.       cholecalciferol, vitamin D3, 1,000 unit tablet Take 1,000 Units by mouth daily.       ergocalciferol (VITAMIN D2) 50,000 unit capsule Take 50,000 Units by mouth once a week.       LANTUS SOLOSTAR U-100 INSULIN 100 unit/mL (3 mL) pen Inject 45 Units under the skin at bedtime. 11.65 Type 2 with hyperglycemia  Contact provider if insulin prescribed is not the preferred insulin per insurance. (Patient taking differently: Inject 34 Units under the skin at bedtime. 11.65 Type 2 with hyperglycemia  Contact provider if insulin prescribed is not the preferred insulin per insurance.      ) 15 mL 0     metFORMIN (GLUCOPHAGE) 1000 MG tablet Take 1,000 mg by mouth 2 (two) times a day with meals.       polyethylene glycol (MIRALAX) 17 gram packet Take 17 g by mouth daily.       senna-docusate (PERICOLACE) 8.6-50 mg tablet Take 1 tablet by mouth daily.       sitaGLIPtin (JANUVIA) 100 MG tablet Take 100 mg by mouth daily.       No current facility-administered medications for this visit.      No Known Allergies  Immunization History   Administered Date(s) Administered     Influenza high dose, seasonal 03/26/2019     Influenza, inj, historic,unspecified 09/16/2015         Review of Systems    Patient denies fever, chills, headache, lightheadedness, dizziness, rhinorrhea, cough, congestion, shortness of breath, chest pain, palpitations, abdominal pain, n/v, diarrhea, constipation, change in appetite, dysuria, frequency, burning or pain with urination.  Other than stated in HPI all other review of systems is negative.       Physical Exam   Vital signs: /75, HR 90, resp 20, temp 97.0   GENERAL APPEARANCE: Well developed, obese woman in no acute distress  HEENT: normocephalic, atraumatic  PERRL, sclerae anicteric, conjunctivae clear and moist, EOM intact  LUNGS: lungs CTA, respiratory effort normal.  CARD: RRR, S1, S2, without murmurs, gallops, rubs,   Abdomen: soft, non-distended, non-tender, +BS x 4   MSK: foot drop right foot  EXTREMITIES: No cyanosis, clubbing or edema.   No s/s of DVT  NEURO: Alert and oriented, face is symmetric.  Good lower extremity sensation.   SKIN: no new lesions, rashes or petechia   PSYCH: euthymic            Labs:    No results found for this or any previous visit (from the past 240 hour(s)).        Assessment:  1. S/P laminectomy     2. Closed compression fracture of third lumbar vertebra, sequela     3. Type 2 diabetes mellitus with hyperglycemia, with long-term current use of insulin (H)     4. Bilateral foot-drop     5. Age-related osteoporosis with current pathological fracture with routine healing, subsequent encounter         Plan:   Closed fracture:  She is to continue TLSO when out of bed.  Unsure if patient will comply with this or may just refuse to get out of bed as sitting the TLSO is uncomfortable. Awaiting AFOs for foot drop.  For the osteoporosis, I will start her on alendronate and she is on vit D for Vit D deficiency. Counseled patient on medications and purpose.  Her BS are increasing so will increase Lantus to 34u.                  Electronically signed by: Teri Brush, EDDIE

## 2021-05-28 NOTE — PROGRESS NOTES
Code Status:  DNR  Visit Type: Follow Up     Facility:  [facility] [[id number]]      Facility Type: SNF (Skilled Nursing Facility, TCU)    History of Present Illness:    Trinidad Brown is a 74 y.o. female was seen today for follow-up TCU visit.  She had a hospitalization at [facility] 3/20 424/2/2019.  She has a past medical history for 4 type 2 diabetes.  She had a fall at home with persistent pain and was seen in the ER.  She had a CT of her spine which showed significant spinal stenosis of her L3 to L5-S1.  An MRI showed lumbar spondylosis with mild acute active compression fracture and moderate edema.  She did have a disc herniation and ligamentous changes on MRI.  Neurosurgery was consulted and she was taken to surgery on 3/29/19 for an L-spine decompressive laminectomy.  Her incision site was stapled closed and postop course was complicated by acute blood loss anemia with a discharge hemoglobin of 8.7 down from baseline of 14.9.  He was also treated for an E. coli UTI with 5 days of amoxicillin.  She was discharged to the TCU with orders to wear a TLSO when out of bed.  She also had significant left foot drop which per patient was chronic and an AFO was ordered.  She did show cognitive impairment however due to cultural issues they were unable to do good cognitive testing while in the hospital.  Her daughter had reported to hospital staff that her memory issues were declining the past couple months.  It was also recommended that she have osteoporosis workup as an outpatient.    Today, I met with her with video  and therapy.  At this time, her progress with her function is at a stand still due to the limitations of TLSO and awaiting the AFOs arrival.  She did have a recent spine xray which indicated the same vertebral fracture.  She is not seeing neurosurgery until 5/13.  I dicussed this with Trinidad and she would like to try therapy without the TLSO as she is not having any pain.  She  has not been taking any pain medications this week.  Her BS and BPs are stable.     Current Outpatient Medications   Medication Sig Dispense Refill     acetaminophen (TYLENOL) 325 MG tablet Take 650 mg by mouth every 6 (six) hours as needed for pain.       atorvastatin (LIPITOR) 40 MG tablet Take 40 mg by mouth at bedtime.       calcium carbonate-vitamin D3 (CALCIUM WITH VITAMIN D) 600 mg(1,500mg) -400 unit per tablet Take 1 tablet by mouth 2 (two) times a day.       LANTUS SOLOSTAR U-100 INSULIN 100 unit/mL (3 mL) pen Inject 45 Units under the skin at bedtime. 11.65 Type 2 with hyperglycemia  Contact provider if insulin prescribed is not the preferred insulin per insurance. (Patient taking differently: Inject 38 Units under the skin at bedtime. 11.65 Type 2 with hyperglycemia  Contact provider if insulin prescribed is not the preferred insulin per insurance.      ) 15 mL 0     metFORMIN (GLUCOPHAGE) 1000 MG tablet Take 1,000 mg by mouth 2 (two) times a day with meals.       polyethylene glycol (MIRALAX) 17 gram packet Take 1 packet (17 g total) by mouth 2 (two) times a day. (Patient taking differently: Take 17 g by mouth daily as needed.       ) 60 packet 0     senna-docusate (PERICOLACE) 8.6-50 mg tablet Take 1 tablet by mouth 2 (two) times a day. 60 tablet 0     sitaGLIPtin (JANUVIA) 100 MG tablet Take 100 mg by mouth daily.       No current facility-administered medications for this visit.      No Known Allergies  Immunization History   Administered Date(s) Administered     Influenza high dose, seasonal 03/26/2019     Influenza, inj, historic,unspecified 09/16/2015         Review of Systems   Patient denies fever, chills, headache, lightheadedness, dizziness, rhinorrhea, cough, congestion, shortness of breath, chest pain, palpitations, abdominal pain, n/v, diarrhea, constipation, change in appetite, dysuria, frequency, burning or pain with urination.  Other than stated in HPI all other review of systems is  negative.       Physical Exam   Vital signs: /70, HR 85, resp 20, temp 97.5   GENERAL APPEARANCE: Well developed, obese woman in no acute distress  HEENT: normocephalic, atraumatic  PERRL, sclerae anicteric, conjunctivae clear and moist, EOM intact  LUNGS: Lung sounds CTA, no adventitious sounds, respiratory effort normal.  CARD: RRR, S1, S2, without murmurs, gallops, rubs,   ABD: Soft and nontender with normal bowel sounds.   MSK: foot drop right foot  EXTREMITIES: No cyanosis, clubbing or edema.   No s/s of DVT  NEURO: Alert and oriented, face is symmetric.  Good lower extremity sensation.   SKIN: no new skin issues.    PSYCH: euthymic            Labs:    Recent Results (from the past 240 hour(s))   Urinalysis   Result Value Ref Range    Color, UA Yellow Colorless, Yellow, Straw, Light Yellow    Clarity, UA Cloudy (!) Clear    Glucose, UA Negative Negative    Bilirubin, UA Negative Negative    Ketones, UA Negative Negative    Specific Gravity, UA 1.020 1.001 - 1.030    Blood, UA Negative Negative    pH, UA 6.5 4.5 - 8.0    Protein, UA Trace (!) Negative mg/dL    Urobilinogen, UA <2.0 E.U./dL <2.0 E.U./dL, 2.0 E.U./dL    Nitrite, UA Positive (!) Negative    Leukocytes, UA Large (!) Negative    Bacteria, UA Many (!) None Seen hpf    RBC, UA 0-2 None Seen, 0-2 hpf    WBC, UA  (!) None Seen, 0-5 hpf    Squam Epithel, UA 0-5 None Seen, 0-5 lpf    Mucus, UA Many (!) None Seen lpf   Culture, Urine   Result Value Ref Range    Culture >100,000 col/ml Escherichia coli (!)     Culture 10,000-50,000 col/ml Citrobacter freundii (!)        Susceptibility    Citrobacter freundii - CR     Amoxicillin + Clavulanate 16/8 Resistant      Ampicillin >16 Resistant      Cefazolin >16 Resistant      Cefepime <=1 Sensitive      Ceftriaxone <=1 Sensitive      Ciprofloxacin <=0.5 Sensitive      Gentamicin <=2 Sensitive      Levofloxacin <=1 Sensitive      Meropenem <=0.25 Sensitive      Nitrofurantoin <=16 Sensitive       Tetracycline <=2 Sensitive      Tobramycin <=2 Sensitive      Trimethoprim + Sulfamethoxazole <=0.5 Sensitive     Escherichia coli - CR     Amoxicillin + Clavulanate <=4/2 Sensitive      Ampicillin <=4 Sensitive      Cefazolin 2 Sensitive      Cefepime <=1 Sensitive      Ceftriaxone <=1 Sensitive      Ciprofloxacin <=0.5 Sensitive      Gentamicin <=2 Sensitive      Levofloxacin <=1 Sensitive      Meropenem <=0.25 Sensitive      Nitrofurantoin <=16 Sensitive      Tetracycline <=2 Sensitive      Tobramycin <=2 Sensitive      Trimethoprim + Sulfamethoxazole <=0.5 Sensitive            Assessment:  1. S/P laminectomy     2. Closed compression fracture of third lumbar vertebra, sequela     3. Pain management     4. Acute cystitis without hematuria         Plan:   At this time, in order to progress her functionality and purpose for a TCU I will allow her try to do therapy without the TLSO as long as she is painless.  Awaiting AFO and therapy thinks this will be available Friday or early next week.      UTI: cultures show sensitivity to Bactrim and she is no long symptomatic so she will continue with the Bactrim through this week.                 Electronically signed by: Teri Brush, EDDIE

## 2021-05-28 NOTE — PROGRESS NOTES
Code Status:  DNR  Visit Type: Follow Up     Facility:  [facility] [[id number]]      Facility Type: SNF (Skilled Nursing Facility, TCU)    History of Present Illness:    Trinidad Brown is a 74 y.o. female was seen today for follow-up TCU visit.  She had a hospitalization at [facility] 3/20 424/2/2019.  She has a past medical history for 4 type 2 diabetes.  She had a fall at home with persistent pain and was seen in the ER.  She had a CT of her spine which showed significant spinal stenosis of her L3 to L5-S1.  An MRI showed lumbar spondylosis with mild acute active compression fracture and moderate edema.  She did have a disc herniation and ligamentous changes on MRI.  Neurosurgery was consulted and she was taken to surgery on 3/29/19 for an L-spine decompressive laminectomy.  Her incision site was stapled closed and postop course was complicated by acute blood loss anemia with a discharge hemoglobin of 8.7 down from baseline of 14.9.  He was also treated for an E. coli UTI with 5 days of amoxicillin.  She was discharged to the TCU with orders to wear a TLSO when out of bed.  She also had significant left foot drop which per patient was chronic and an AFO was ordered.  She did show cognitive impairment however due to cultural issues they were unable to do good cognitive testing while in the hospital.  Her daughter had reported to hospital staff that her memory issues were declining the past couple months.  It was also recommended that she have osteoporosis workup as an outpatient.    Today, I met with patient with a [email or web address] video .  She denies any pain today. She as good range of motion and sensation to her lower extremities.  No s/s of DVTs. She has not used dilaudid for pain in over a week. Her BS continue to be in good range with decreasing her Lantus.  Most of the BS are 90s-low 100s.     Current Outpatient Medications   Medication Sig Dispense Refill     acetaminophen (TYLENOL) 325 MG  tablet Take 650 mg by mouth every 6 (six) hours as needed for pain.       atorvastatin (LIPITOR) 40 MG tablet Take 40 mg by mouth at bedtime.       calcium carbonate-vitamin D3 (CALCIUM WITH VITAMIN D) 600 mg(1,500mg) -400 unit per tablet Take 1 tablet by mouth 2 (two) times a day.       LANTUS SOLOSTAR U-100 INSULIN 100 unit/mL (3 mL) pen Inject 45 Units under the skin at bedtime. 11.65 Type 2 with hyperglycemia  Contact provider if insulin prescribed is not the preferred insulin per insurance. (Patient taking differently: Inject 38 Units under the skin at bedtime. 11.65 Type 2 with hyperglycemia  Contact provider if insulin prescribed is not the preferred insulin per insurance.      ) 15 mL 0     metFORMIN (GLUCOPHAGE) 1000 MG tablet Take 1,000 mg by mouth 2 (two) times a day with meals.       polyethylene glycol (MIRALAX) 17 gram packet Take 1 packet (17 g total) by mouth 2 (two) times a day. (Patient taking differently: Take 17 g by mouth daily as needed.       ) 60 packet 0     senna-docusate (PERICOLACE) 8.6-50 mg tablet Take 1 tablet by mouth 2 (two) times a day. 60 tablet 0     sitaGLIPtin (JANUVIA) 100 MG tablet Take 100 mg by mouth daily.       No current facility-administered medications for this visit.      No Known Allergies  Immunization History   Administered Date(s) Administered     Influenza high dose, seasonal 03/26/2019     Influenza, inj, historic,unspecified 09/16/2015         Review of Systems   Patient denies fever, chills, headache, lightheadedness, dizziness, rhinorrhea, cough, congestion, shortness of breath, chest pain, palpitations, abdominal pain, n/v, diarrhea, constipation, change in appetite, dysuria, frequency, burning or pain with urination.  Other than stated in HPI all other review of systems is negative.             Physical Exam   Vital signs: /67, HR 96, resp 20, temp 97.0   GENERAL APPEARANCE: Well developed, obese woman in no acute distress  HEENT: normocephalic,  atraumatic  PERRL, sclerae anicteric, conjunctivae clear and moist, EOM intact  LUNGS: Lung sounds CTA, no adventitious sounds, respiratory effort normal.  CARD: RRR, S1, S2, without murmurs, gallops, rubs,   ABD: Soft and nontender with normal bowel sounds.   EXTREMITIES: No cyanosis, clubbing or edema.  Good CMS with positive pulses to lower extremities. No s/s of DVT  NEURO: Alert and oriented, face is symmetric.  Good lower extremity sensation.   SKIN: no new skin issues.    PSYCH: euthymic            Labs:    Recent Results (from the past 240 hour(s))   T4, Free   Result Value Ref Range    Free T4 1.3 0.7 - 1.8 ng/dL   Thyroid Stimulating Hormone (TSH)   Result Value Ref Range    TSH 0.44 0.30 - 5.00 uIU/mL   Vitamin D, Total (25-Hydroxy)   Result Value Ref Range    Vitamin D, Total (25-Hydroxy) 23.4 (L) 30.0 - 80.0 ng/mL           Assessment:  1. S/P laminectomy     2. Closed compression fracture of third lumbar vertebra, sequela     3. Pain management     4. Type 2 diabetes mellitus with hyperglycemia, with long-term current use of insulin (H)         Plan:   Compression fracture and s/p laminectomy: continue wearing TLSO when out of bed, follow up with neurosurgery in the next 2 weeks. Continue therapies.     Pain management: DC dilaudid, change acetaminophen to 650mg every 6 hours prn.      DM: decrease Lantus to 38u at HS and monitor BS                Electronically signed by: Teri Brush CNP

## 2021-05-28 NOTE — PATIENT INSTRUCTIONS - HE
A dressing is not required.    Keep the wound clean.    Wash your hands before touching the wound.  Ensure that anyone assisting you in the care of your wound washes her/his hands before touching the wound. Good handwashing can decrease the risk of serious infection.    If you are unable to see your wound, have someone check the wound daily for redness, swelling,or drainage. A small amount of drainage is normal.    You may shower.  Pat the wound dry. Do not rub.    No tub baths until the wound is well healed.  Usually 5-6 weeks.     If you develop redness, swelling, drainage, or temp 101 or greater, call our office at [phone].        * No lifting, pushing or pulling greater than 5-10 pound (this is about a gallon of milk).  *No repetitive bending, twisting, or jarring activities  *No overhead work  *No aerobic or strenuous activity  *No activities with increased risk of falls  *You may move about your home as tolerated  *You may walk up and down stairs as tolerated  *You may increase your activity slowly over the next 4-6 weeks    WALKING PROGRAM: As you can tolerate, walk daily-start with 5-10 minutes of continuous walking. This is in addition to the walking that you do as part of your daily activities. Increase the time that you walk by 5 minutes every couple of days. Do not exceed 30-45 minutes of continuous walking until seen in follow-up. Walking is the best exercise after surgery.  **Listen to your body, if you find that you are more painful or fatigued, you may need to proceed more slowly.    **Do not smoke or expose yourself to second hand smoke. Cigarette smoke can delay healing and cause complications.

## 2021-05-28 NOTE — PROGRESS NOTES
"NEUROSURGERY FOLLOW UP EVALUATION:    ASSESSMENT  Trinidad Brown is a 74 y.o. female, who presents today status post Lumbar 3- Lumbar-4, Lumbar 4-Lumbar -5, Lumbar 5-Sacral 1, decompressive laminectomy for spinal stenosis by Dr Thomas on 3/29/2019. Patient also being treated in TLSO brace for L1 compression fracture.     Today she reports numbness and tingling bilateral hands/feet, unable to tell me if it is chronic. Multiple area's of pain, mostly bilateral knees. Has dilaudid on her medication list as well as tylenol from the TCU but uncertain if she is taking these. She is unable to tell me. Tells me she cannot walk without her walker. She does not feel as if she is progressing. Discussed it will take some time for her to see improvement, particularly because she was weak for an unknown, but estimated long period of time prior to surgery. She admits today is the first day she has worn her TLSO. She is here without family but has  present. She cannot tell me where she slept last night or why she is here. \"They told me to come, so I came.\" Initially tells me she cannot lift her legs. Later in the exam, lifts bilateral legs. I saw Trinidad while she was in the hospital and her LE strength appears at baseline. She has chronic left foot drop. Was given a brace in the hospital. Is not wearing it today.     PLAN:   1. RTC in 4 weeks with XR to assess fracture and for 6 week post op follow up. Please have daughter come.   2. Osteoporosis evaluation and treatment  3. Continue TLSO brace as directed  4. Continue therapy at TCU.     Left a message for patient's daughterPatrick to call me today to discuss plan of care. Called TCU to discuss need for patient to wear her brace.     HPI: Trinidad Brown is a 72 yo female with PMH significant for DM II, who presented to the Emergency Department 3/25/2019 for evaluation of low back pain. She reported to the ED staff that she fell several times yesterday, she tells me she fell ten days " "ago. She reports she has been unable to walk \"for at least several days, maybe a week or more\". She reports she has had worsening lower extremity weakness over a period of years which she has attributed to her low back pain. She denies change in bowel or bladder continence. She does endorse falls. Feels her legs do not hold her up. After another fall yesterday noticed significantly worse back pain and presented to the ED for further treatment. Imaging revealed an L1 compression fracture as well as severe stenosis at L4-5 with compression of the cauda equina.     Past Medical History:   Diagnosis Date     Squamous cell carcinoma of back      Type 2 diabetes mellitus with hyperglycemia (H) 12/9/2015      Past Surgical History:   Procedure Laterality Date     BACK SURGERY      tumor removal     LUMBAR LAMINECTOMY Bilateral 3/29/2019    Procedure: Lumbar 3- Lumbar-4, Lumbar 4-Lumbar -5, Lumbar 5-Sacral 1,  decompressive laminectomy.;  Surgeon: Naeem Thomas MD;  Location: [facility] [place];  Service: Spine       Current Outpatient Medications   Medication Sig     atorvastatin (LIPITOR) 40 MG tablet Take 40 mg by mouth at bedtime.     calcium carbonate-vitamin D3 (CALCIUM WITH VITAMIN D) 600 mg(1,500mg) -400 unit per tablet Take 1 tablet by mouth 2 (two) times a day.     HYDROmorphone (DILAUDID) 2 MG tablet Take 2 mg by mouth every 3 (three) hours as needed for pain. 0.5 to 1 tab every 3 hours prn     LANTUS SOLOSTAR U-100 INSULIN 100 unit/mL (3 mL) pen Inject 45 Units under the skin at bedtime. 11.65 Type 2 with hyperglycemia  Contact provider if insulin prescribed is not the preferred insulin per insurance.     metFORMIN (GLUCOPHAGE) 1000 MG tablet Take 1,000 mg by mouth 2 (two) times a day with meals.     polyethylene glycol (MIRALAX) 17 gram packet Take 1 packet (17 g total) by mouth 2 (two) times a day.     senna-docusate (PERICOLACE) 8.6-50 mg tablet Take 1 tablet by mouth 2 (two) times a day.     " sitaGLIPtin (JANUVIA) 100 MG tablet Take 100 mg by mouth daily.       No Known Allergies    Social History     Socioeconomic History     Marital status: Single     Spouse name: Not on file     Number of children: Not on file     Years of education: Not on file     Highest education level: Not on file   Occupational History     Not on file   Social Needs     Financial resource strain: Not on file     Food insecurity:     Worry: Not on file     Inability: Not on file     Transportation needs:     Medical: Not on file     Non-medical: Not on file   Tobacco Use     Smoking status: Never Smoker     Smokeless tobacco: Never Used   Substance and Sexual Activity     Alcohol use: No     Drug use: No     Sexual activity: Yes     Birth control/protection: Post-menopausal   Lifestyle     Physical activity:     Days per week: Not on file     Minutes per session: Not on file     Stress: Not on file   Relationships     Social connections:     Talks on phone: Not on file     Gets together: Not on file     Attends Congregational service: Not on file     Active member of club or organization: Not on file     Attends meetings of clubs or organizations: Not on file     Relationship status: Not on file     Intimate partner violence:     Fear of current or ex partner: Not on file     Emotionally abused: Not on file     Physically abused: Not on file     Forced sexual activity: Not on file   Other Topics Concern     Not on file   Social History Narrative     Not on file       Family History   Problem Relation Age of Onset     Stroke Daughter      Hypertension Daughter      No Medical Problems Son        PHYSICAL EXAM:  Heart Rate:  [100] 100  Resp:  [18] 18  BP: (108)/(66) 108/66    Alert and oriented x3, speech baseline, Hmong speaking  PERRL, EOMI, face symmetric, tongue midline, shoulder shrug equal  No pronator drift, finger to nose smooth and accurate bilaterally  Arm strength: 5/5  Leg strength: 5/5   Ankle dorsiflexion: 5/5 right, 3/5  left   Extensor hallicus longus: 5/5 right, 3/5 left   Ankle plantar flexion : 5/5 right, 3/5 left     Bulk and tone: normal for age  Reflexes: biceps, triceps, brachioradialis, patellar and achilles 2+  No pathological reflexes   Coordination/Gait: not assessed.   Incision: Staples removed today by DAVION Garza     IMAGING:  The imaging was personally reviewed by me.     Lumbar XR  Patient is imaged in a spinal brace. 5 lumbar type vertebra. Broad lumbar dextrocurvature. Straightened lumbar lordosis. Mild to moderate superior endplate compression fracture at L1 with 40% central vertebral height loss has slightly progressed compared to the prior CT. No new fracture or progressive vertebral height loss elsewhere. Multilevel lumbar spondylosis as seen previously including advanced loss of disc height at L2-3 and L3-4 and mild to moderate loss of disc height at L4-5 and L5-S1 with associated multilevel endplate spurring. Dorsal decompression at L3 and L4.    Alana Bullock, [facility]  O: [phone]

## 2021-05-28 NOTE — PATIENT INSTRUCTIONS - HE
PLAN:   1. RTC in 4 weeks with XR to assess fracture  2. Osteoporosis evaluation and treatment  3. Continue TLSO brace as directed. Wear your brace anytime you are out of bed.   WEARING THE LUMBAR BRACE:  * Wear the brace when out of bed or sitting upright in bed.  * You should apply the brace before standing.  * You may shower seated without brace.   Sit down on shower chair, remove brace   Shower   Dry   Re-apply brace before standing.   Take care not to fall  *If your brace is not fitting call [facility]   *Check  your incision and the skin under your brace at least daily.  4. Please have your daughter come to your next appointment.   5. Continue physical therapy as able at [facility].

## 2021-05-28 NOTE — PROGRESS NOTES
Trinidad Brown is status post lumbar L3-4-5-S1 bilateral decompressive laminectomy on 3/24/19 and 3/29/2019 by Dr. Naeem Thomas.  Also has L1 compression fracture - had AP/Lat xrays done this morning.  Last seen on 4/15/2019 for a wound check.  Today she returns in follow up for staples removal. She is here with her . Resides at [facility]. In a wheelchair, able to walk short distance. In TLSO brace. Reports a mild mid back pain. Denies radicular pain. No new symptoms in the interim.    Surgical wound WNL - CDI, no signs of infection or skin breakdown.  Incision well-healed: good skin approximation, no redness or visible/palpable edema, no tenderness to palpation.  PT. AF, denies fever, chills or sweats.  Pt. reports that the symptoms are improved from pre-op.    Staples - intact removed without difficulty. Wound prepped with Betadine before and after removal.  Surrounding skin has no signs of breakdown.  Verbal instructions regarding incision care are given.  Pt. advised to call us if any s/s of infection noted - all discussed in details.      Kayla Barrientos, RN, CNRN

## 2021-05-28 NOTE — PATIENT INSTRUCTIONS - HE
1.  Fracture is NOT healed. Wear the brace when upright.  2.  Continue physical therapy.  3.  Follow up in 4 weeks with Lumbar spine xrays.   4.  Needs osteoporosis work up and treatment

## 2021-05-28 NOTE — PROGRESS NOTES
Code Status:  DNR  Visit Type: Follow Up     Facility:  [facility] [[id number]]      Facility Type: SNF (Skilled Nursing Facility, TCU)    History of Present Illness:    Trinidad Brown is a 74 y.o. female was seen today for follow-up TCU visit.  She had a hospitalization at [facility] 3/20 424/2/2019.  She has a past medical history for 4 type 2 diabetes.  She had a fall at home with persistent pain and was seen in the ER.  She had a CT of her spine which showed significant spinal stenosis of her L3 to L5-S1.  An MRI showed lumbar spondylosis with mild acute active compression fracture and moderate edema.  She did have a disc herniation and ligamentous changes on MRI.  Neurosurgery was consulted and she was taken to surgery on 3/29/19 for an L-spine decompressive laminectomy.  Her incision site was stapled closed and postop course was complicated by acute blood loss anemia with a discharge hemoglobin of 8.7 down from baseline of 14.9.  He was also treated for an E. coli UTI with 5 days of amoxicillin.  She was discharged to the TCU with orders to wear a TLSO when out of bed.  She also had significant left foot drop which per patient was chronic and an AFO was ordered.  She did show cognitive impairment however due to cultural issues they were unable to do good cognitive testing while in the hospital.  Her daughter had reported to hospital staff that her memory issues were declining the past couple months.  It was also recommended that she have osteoporosis workup as an outpatient.    Today, I met with patient with the video  and therapy.  Today she is more interactive and wants to participate in therapy and even go to the bathroom for toileting.  Therapy is still awaiting her AFOs in order to progress her ambulation.  Last week I did order for her to do therapy without the TLSO as this is quite a barrier in order for us to get her to cooperate and participate in therapy.  Eyes any pain  and has not been taking any pain medications.  Her incision is well approximated and healed.  Her blood sugars have been decreasing ranging from 70-140s.  She reports she does have a good appetite and feels she is eating well.  Her weight has not been checked since her admission.    Current Outpatient Medications   Medication Sig Dispense Refill     acetaminophen (TYLENOL) 325 MG tablet Take 650 mg by mouth every 6 (six) hours as needed for pain.       atorvastatin (LIPITOR) 40 MG tablet Take 40 mg by mouth at bedtime.       calcium carbonate-vitamin D3 (CALCIUM WITH VITAMIN D) 600 mg(1,500mg) -400 unit per tablet Take 1 tablet by mouth 2 (two) times a day.       LANTUS SOLOSTAR U-100 INSULIN 100 unit/mL (3 mL) pen Inject 45 Units under the skin at bedtime. 11.65 Type 2 with hyperglycemia  Contact provider if insulin prescribed is not the preferred insulin per insurance. (Patient taking differently: Inject 35 Units under the skin at bedtime. 11.65 Type 2 with hyperglycemia  Contact provider if insulin prescribed is not the preferred insulin per insurance.      ) 15 mL 0     metFORMIN (GLUCOPHAGE) 1000 MG tablet Take 1,000 mg by mouth 2 (two) times a day with meals.       sitaGLIPtin (JANUVIA) 100 MG tablet Take 100 mg by mouth daily.       No current facility-administered medications for this visit.      No Known Allergies  Immunization History   Administered Date(s) Administered     Influenza high dose, seasonal 03/26/2019     Influenza, inj, historic,unspecified 09/16/2015         Review of Systems   Patient denies fever, chills, headache, lightheadedness, dizziness, rhinorrhea, cough, congestion, shortness of breath, chest pain, palpitations, abdominal pain, n/v, diarrhea, constipation, change in appetite, dysuria, frequency, burning or pain with urination.  Other than stated in HPI all other review of systems is negative.       Physical Exam   Vital signs: /68, heart rate 84, respiratory 22, temp 97.2.    GENERAL APPEARANCE: Well developed, obese woman in no acute distress  HEENT: normocephalic, atraumatic  PERRL, sclerae anicteric, conjunctivae clear and moist, EOM intact  LUNGS:respiratory effort normal.  CARD: RRR, S1, S2, without murmurs, gallops, rubs,   MSK: foot drop right foot  EXTREMITIES: No cyanosis, clubbing or edema.   No s/s of DVT  NEURO: Alert and oriented, face is symmetric.  Good lower extremity sensation.   SKIN: no new skin issues.    PSYCH: euthymic            Labs:    Recent Results (from the past 240 hour(s))   Urinalysis   Result Value Ref Range    Color, UA Yellow Colorless, Yellow, Straw, Light Yellow    Clarity, UA Cloudy (!) Clear    Glucose, UA Negative Negative    Bilirubin, UA Negative Negative    Ketones, UA Negative Negative    Specific Gravity, UA 1.020 1.001 - 1.030    Blood, UA Negative Negative    pH, UA 6.5 4.5 - 8.0    Protein, UA Trace (!) Negative mg/dL    Urobilinogen, UA <2.0 E.U./dL <2.0 E.U./dL, 2.0 E.U./dL    Nitrite, UA Positive (!) Negative    Leukocytes, UA Large (!) Negative    Bacteria, UA Many (!) None Seen hpf    RBC, UA 0-2 None Seen, 0-2 hpf    WBC, UA  (!) None Seen, 0-5 hpf    Squam Epithel, UA 0-5 None Seen, 0-5 lpf    Mucus, UA Many (!) None Seen lpf   Culture, Urine   Result Value Ref Range    Culture >100,000 col/ml Escherichia coli (!)     Culture 10,000-50,000 col/ml Citrobacter freundii (!)        Susceptibility    Citrobacter freundii - CR     Amoxicillin + Clavulanate 16/8 Resistant      Ampicillin >16 Resistant      Cefazolin >16 Resistant      Cefepime <=1 Sensitive      Ceftriaxone <=1 Sensitive      Ciprofloxacin <=0.5 Sensitive      Gentamicin <=2 Sensitive      Levofloxacin <=1 Sensitive      Meropenem <=0.25 Sensitive      Nitrofurantoin <=16 Sensitive      Tetracycline <=2 Sensitive      Tobramycin <=2 Sensitive      Trimethoprim + Sulfamethoxazole <=0.5 Sensitive     Escherichia coli - CR     Amoxicillin + Clavulanate <=4/2 Sensitive       Ampicillin <=4 Sensitive      Cefazolin 2 Sensitive      Cefepime <=1 Sensitive      Ceftriaxone <=1 Sensitive      Ciprofloxacin <=0.5 Sensitive      Gentamicin <=2 Sensitive      Levofloxacin <=1 Sensitive      Meropenem <=0.25 Sensitive      Nitrofurantoin <=16 Sensitive      Tetracycline <=2 Sensitive      Tobramycin <=2 Sensitive      Trimethoprim + Sulfamethoxazole <=0.5 Sensitive            Assessment:  1. S/P laminectomy     2. Closed compression fracture of third lumbar vertebra, sequela     3. Type 2 diabetes mellitus with hyperglycemia, with long-term current use of insulin (H)     4. Acute cystitis without hematuria         Plan:   Status post laminectomy and compression fracture: Continue with therapies without the TLSO as long as she is pain-free.  She will follow-up with neurosurgery next week as currently planned.    Diabetes: I will have nursing check her weights weekly as I am not sure if she is actually eating adequately enough and so if she has noted weight loss could likely be that she is not eating well enough and that is why her blood sugars are lower.  At this time I am going to decrease her Lantus at 35 units, and continue metformin 1000 mg twice daily.    UTI: Resolved and antibiotic is now complete.                Electronically signed by: Teri Brush, CNP

## 2021-05-28 NOTE — PROGRESS NOTES
Code Status:  DNR  Visit Type: Follow Up     Facility:  [facility] [[id number]]      Facility Type: SNF (Skilled Nursing Facility, TCU)    History of Present Illness:    Trinidad Brown is a 74 y.o. female seen today for follow-up TCU visit.  She had a hospitalization at  3/20 424/2/2019.  She has a past medical history for 4 type 2 diabetes.  She had a fall at home with persistent pain and was seen in the ER.  She had a CT of her spine which showed significant spinal stenosis of her L3 to L5-S1.  An MRI showed lumbar spondylosis with mild acute active compression fracture and moderate edema.  She did have a disc herniation and ligamentous changes on MRI.  Neurosurgery was consulted and she was taken to surgery on 3/29/19 for an L-spine decompressive laminectomy.  Her incision site was stapled closed and postop course was complicated by acute blood loss anemia with a discharge hemoglobin of 8.7 down from baseline of 14.9.  He was also treated for an E. coli UTI with 5 days of amoxicillin.  She was discharged to the TCU with orders to wear a TLSO when out of bed.  She also had significant left foot drop which per patient was chronic and an AFO was ordered.  She did show cognitive impairment however due to cultural issues they were unable to do good cognitive testing while in the hospital.  Her daughter had reported to hospital staff that her memory issues were declining the past couple months.  It was also recommended that she have osteoporosis workup as an outpatient.    Today, I met with patient with a hmong speaking staff.  She denies any pain today. She as good range of motion and sensation to her lower extremities.  No s/s of DVTs.  She has been getting up in the wheelchair more often and states she has been ambulating with therapy.  She reports a good normal appetite.  Her BS have ranged from 90-160s.  Her vitamin D was checked last week and was 23.4.        Current Outpatient  Medications   Medication Sig Dispense Refill     acetaminophen (TYLENOL) 500 MG tablet Take 1,000 mg by mouth 3 (three) times a day.       atorvastatin (LIPITOR) 40 MG tablet Take 40 mg by mouth at bedtime.       calcium carbonate-vitamin D3 (CALCIUM WITH VITAMIN D) 600 mg(1,500mg) -400 unit per tablet Take 1 tablet by mouth 2 (two) times a day.       HYDROmorphone (DILAUDID) 2 MG tablet Take 2 mg by mouth every 3 (three) hours as needed for pain. 0.5 to 1 tab every 3 hours prn       LANTUS SOLOSTAR U-100 INSULIN 100 unit/mL (3 mL) pen Inject 45 Units under the skin at bedtime. 11.65 Type 2 with hyperglycemia  Contact provider if insulin prescribed is not the preferred insulin per insurance. (Patient taking differently: Inject 42 Units under the skin at bedtime. 11.65 Type 2 with hyperglycemia  Contact provider if insulin prescribed is not the preferred insulin per insurance.      ) 15 mL 0     metFORMIN (GLUCOPHAGE) 1000 MG tablet Take 1,000 mg by mouth 2 (two) times a day with meals.       polyethylene glycol (MIRALAX) 17 gram packet Take 1 packet (17 g total) by mouth 2 (two) times a day. 60 packet 0     senna-docusate (PERICOLACE) 8.6-50 mg tablet Take 1 tablet by mouth 2 (two) times a day. 60 tablet 0     sitaGLIPtin (JANUVIA) 100 MG tablet Take 100 mg by mouth daily.       No current facility-administered medications for this visit.      No Known Allergies  Immunization History   Administered Date(s) Administered     Influenza high dose, seasonal 03/26/2019     Influenza, inj, historic,unspecified 09/16/2015         Review of Systems   Patient denies fever, chills, headache, lightheadedness, dizziness, rhinorrhea, cough, congestion, shortness of breath, chest pain, palpitations, abdominal pain, n/v, diarrhea, constipation, change in appetite, dysuria, frequency, burning or pain with urination.  Other than stated in HPI all other review of systems is negative.             Physical Exam   Vital signs: /73,  HR 86, resp 20, temp 97.9.   GENERAL APPEARANCE: Well developed, obese woman in no acute distress  HEENT: normocephalic, atraumatic  PERRL, sclerae anicteric, conjunctivae clear and moist, EOM intact  LUNGS: Lung sounds CTA, no adventitious sounds, respiratory effort normal.  CARD: RRR, S1, S2, without murmurs, gallops, rubs,   ABD: Soft and nontender with normal bowel sounds.   EXTREMITIES: No cyanosis, clubbing or edema.  Good CMS with positive pulses to lower extremities. No s/s of DVT  NEURO: Alert and oriented, face is symmetric.  Good lower extremity sensation.   SKIN: no new skin issues.    PSYCH: euthymic            Labs:    Recent Results (from the past 240 hour(s))   T4, Free   Result Value Ref Range    Free T4 1.3 0.7 - 1.8 ng/dL   Thyroid Stimulating Hormone (TSH)   Result Value Ref Range    TSH 0.44 0.30 - 5.00 uIU/mL   Vitamin D, Total (25-Hydroxy)   Result Value Ref Range    Vitamin D, Total (25-Hydroxy) 23.4 (L) 30.0 - 80.0 ng/mL           Assessment:  1. S/P laminectomy     2. Closed compression fracture of third lumbar vertebra, sequela     3. Pain management     4. Type 2 diabetes mellitus with hyperglycemia, with long-term current use of insulin (H)     5. Vitamin D deficiency         Plan:   Compression fracture and s/p laminectomy: improving, continue with TLSO, continue therapies.  See neuro surgery later this week.      Pain management: continue with dilaudid 1mg every 4 hours as needed.  Will plan to decrease this later this week.  She is only taking it 1-2 x daily.     DM: stable, will decrease lantus to 40u, continue metformin and januvia    Vit D deficiency: start vit d 50,000 x 4 weeks and recheck in 4 weeks.  Will discontinue calcium car-vit D3 and start calcium carb 1000 daily alone.             Electronically signed by: Teri Brush CNP

## 2021-05-29 NOTE — PROGRESS NOTES
Code Status:  DNR  Visit Type: Follow Up     Facility:  [facility] [[id number]]      Facility Type: SNF (Skilled Nursing Facility, TCU)    History of Present Illness:    Trinidad Brown is a 74 y.o. female was seen today for follow-up TCU visit.  She had a hospitalization at [facility] 3/20 424/2/2019.  She has a past medical history for 4 type 2 diabetes.  She had a fall at home with persistent pain and was seen in the ER.  She had a CT of her spine which showed significant spinal stenosis of her L3 to L5-S1.  An MRI showed lumbar spondylosis with mild acute active compression fracture and moderate edema.  She did have a disc herniation and ligamentous changes on MRI.  Neurosurgery was consulted and she was taken to surgery on 3/29/19 for an L-spine decompressive laminectomy.  Her incision site was stapled closed and postop course was complicated by acute blood loss anemia with a discharge hemoglobin of 8.7 down from baseline of 14.9.  He was also treated for an E. coli UTI with 5 days of amoxicillin.  She was discharged to the TCU with orders to wear a TLSO when out of bed.  She also had significant left foot drop which per patient was chronic and an AFO was ordered.  She did show cognitive impairment however due to cultural issues they were unable to do good cognitive testing while in the hospital.  Her daughter had reported to hospital staff that her memory issues were declining the past couple months.  It was also recommended that she have osteoporosis workup as an outpatient.    Today, I examine the patient with a video .  She is very disgruntle, yelling at me that she had asked for the commode and if I wasn't there to help her to get out.  She refused to answer any of my questions and agreed to only let me listen to her heart and lungs while waiting for nursing to come to help. She is planning to be discharged to home with family support next week as she is reaching capacity with  therapy.      She will need a super roxi height wheelchair, light weight 18 inches with standard foot pedals with a pressure reduction cushion. She continues to need max assist of 2 for standing and transferring due to significant ankle weakness and right foot drop even with custom AFOs. She also requires to wear her TLSO for her compression vertebral fracture which is very limiting with her mobility and effort.  She is unable to ambulate safety so a wheelchair would allow for her to maintain some independence in ADLs/IADLs and also allow for mobility in the community.  She has been working with therapy in slide board transfers in which she is able to do with verbal and physical prompts.     Current Outpatient Medications   Medication Sig Dispense Refill     acetaminophen (TYLENOL) 325 MG tablet Take 650 mg by mouth every 6 (six) hours as needed for pain.       alendronate (FOSAMAX) 10 MG tablet Take 70 mg by mouth once a week. Take in the morning on an empty stomach with a full glass of water 30 minutes before food       atorvastatin (LIPITOR) 40 MG tablet Take 40 mg by mouth at bedtime.       calcium, as carbonate, (TUMS) 200 mg calcium (500 mg) chewable tablet Chew 2 tablets daily.       cholecalciferol, vitamin D3, 1,000 unit tablet Take 2,000 Units by mouth daily.              insulin glargine (LANTUS) 100 unit/mL injection Inject 34 Units under the skin at bedtime.       LANTUS SOLOSTAR U-100 INSULIN 100 unit/mL (3 mL) pen Inject 45 Units under the skin at bedtime. 11.65 Type 2 with hyperglycemia  Contact provider if insulin prescribed is not the preferred insulin per insurance. (Patient taking differently: Inject 34 Units under the skin at bedtime. 11.65 Type 2 with hyperglycemia  Contact provider if insulin prescribed is not the preferred insulin per insurance.      ) 15 mL 0     metFORMIN (GLUCOPHAGE) 1000 MG tablet Take 1,000 mg by mouth 2 (two) times a day with meals.       polyethylene glycol (MIRALAX)  17 gram packet Take 17 g by mouth daily.       senna-docusate (PERICOLACE) 8.6-50 mg tablet Take 1 tablet by mouth daily.       sitaGLIPtin (JANUVIA) 100 MG tablet Take 100 mg by mouth daily.       No current facility-administered medications for this visit.      No Known Allergies  Immunization History   Administered Date(s) Administered     Influenza high dose, seasonal 03/26/2019     Influenza, inj, historic,unspecified 09/16/2015         Review of Systems   She refuses to answer ROS questions.  Nursing denies any issues with urinary or bowel systems.       Physical Exam   Vital signs: BP 97/58, HR 71, resp 16, temp 98.1 most BS <180   GENERAL APPEARANCE: Well developed, obese woman in no acute distress  HEENT: normocephalic, atraumatic  sclerae anicteric, conjunctivae clear and moist, EOM intact  LUNGS: lungs CTA, respiratory effort normal.  CARD: RRR, S1, S2, without murmurs, gallops, rubs,   MSK: foot drop right foot  EXTREMITIES: No cyanosis, clubbing or edema.     NEURO: Alert and oriented, face is symmetric.    SKIN: no new lesions, rashes or petechia   PSYCH: euthymic            Labs:    No results found for this or any previous visit (from the past 240 hour(s)).        Assessment:  1. Closed compression fracture of third lumbar vertebra, sequela     2. S/P laminectomy     3. Type 2 diabetes mellitus with hyperglycemia, with long-term current use of insulin (H)     4. Age-related osteoporosis with current pathological fracture with routine healing, subsequent encounter     5. Weight loss         Plan:   Compression fracture: She is to follow up with Neurosurgery in June.  Continue with TLSO. She has denied pain with nursing.      DM: controlled, continue Lantus, metformin and sitagliptin.      Osteoporosis: continue vit D, calcium and alendronate.     We are now preparing her for discharge sometime next week.  Her weight has stabilized at 176lbs.  Continue with oral supplements.           Electronically  signed by: Teri Brush, CNP

## 2021-05-29 NOTE — TELEPHONE ENCOUNTER
Left message for daughter Armen Bhat to callback to reschedule f/u fracture appt. Xray has been completed but patient didn't have [facility].

## 2021-05-29 NOTE — PROGRESS NOTES
Code Status:  FULL CODE  Visit Type: Discharge Summary     Facility:  [facility] [[id number]]          PCP:  Rohini Alvarez MD  [phone]       Admission Date to our Facility: 4/2/2019 Discharge Date from our Facility: 6/4/2019    Discharge Diagnosis:    1. Closed compression fracture of third lumbar vertebra, sequela     2. S/P laminectomy     3. Type 2 diabetes mellitus with hyperglycemia, with long-term current use of insulin (H)     4. Age-related osteoporosis with current pathological fracture with routine healing, subsequent encounter     5. Vitamin D deficiency     6. Bilateral foot-drop     7. Falls frequently          History of Present Illness: Trinidad Brown is a 74 y.o. female with a pmh of diabetes. She had a hospitalization at [facility] 3/20 424/2/2019.She had a fall at home with persistent pain and was seen in the ER.  She had a CT of her spine which showed significant spinal stenosis of her L3 to L5-S1.  An MRI showed lumbar spondylosis with mild acute active compression fracture and moderate edema.  She did have a disc herniation and ligamentous changes on MRI.  Neurosurgery was consulted and she was taken to surgery on 3/29/19 for an L-spine decompressive laminectomy.  Her incision site was stapled closed and postop course was complicated by acute blood loss anemia with a discharge hemoglobin of 8.7 down from baseline of 14.9.  He was also treated for an E. coli UTI with 5 days of amoxicillin.  She was discharged to the TCU with orders to wear a TLSO when out of bed.  She also had significant left foot drop which per patient was chronic and an AFO was ordered.  She did show cognitive impairment however due to cultural issues they were unable to do good cognitive testing while in the hospital.  Her daughter had reported to hospital staff that her memory issues were declining the past couple months.  It was also recommended that she have osteoporosis workup as an  outpatient.    Skilled Nursing Facility Course:  While at the TCU she was started on Calcium and Vit D for Vit D deficiency.  She was also started on alendronate for presumed osteoporosis.  She will need a DEXA scan as an outpatient.  She has had frequent falls while at the TCU, due to getting up without asking for help,  in which she had multiple xrays showing no new fractures.  She was noted to have significant bilateral foot drop and so was fitted for custom AFOS which allows her to be more stable with transfers however being in the TLSO does extensively limit her mobility and she has not been able to work much on ambulation.  She was seen by NeuroSurgery 1 month after surgery and the vertebra fracture had not healed so it was recommend she continue with the TLSO x 6 more weeks.  She should follow up with neurosurgery in the next couple of weeks. She was treated for 2 different UTIs in which she exhibited no urinary symptoms however she did become more confused which resolved with abx treatment.  For her Diabetes; she was taken off of glipizide due to lower BS.  Also, her Lantus was able to adjust from 45u to 34u.  She did have some weight loss due to not eating much but that improved with oral supplements and her weight has stabilized.      Discharge Medications:    Current Outpatient Medications   Medication Sig Dispense Refill     acetaminophen (TYLENOL) 325 MG tablet Take 650 mg by mouth every 6 (six) hours as needed for pain.       alendronate (FOSAMAX) 10 MG tablet Take 70 mg by mouth once a week. Take in the morning on an empty stomach with a full glass of water 30 minutes before food       atorvastatin (LIPITOR) 40 MG tablet Take 40 mg by mouth at bedtime.       calcium, as carbonate, (TUMS) 200 mg calcium (500 mg) chewable tablet Chew 2 tablets daily.       cholecalciferol, vitamin D3, 1,000 unit tablet Take 2,000 Units by mouth daily.              insulin glargine (LANTUS) 100 unit/mL injection Inject  34 Units under the skin at bedtime.       metFORMIN (GLUCOPHAGE) 1000 MG tablet Take 1,000 mg by mouth 2 (two) times a day with meals.       polyethylene glycol (MIRALAX) 17 gram packet Take 17 g by mouth daily.       senna-docusate (PERICOLACE) 8.6-50 mg tablet Take 1 tablet by mouth daily.       sitaGLIPtin (JANUVIA) 100 MG tablet Take 100 mg by mouth daily.       LANTUS SOLOSTAR U-100 INSULIN 100 unit/mL (3 mL) pen Inject 45 Units under the skin at bedtime. 11.65 Type 2 with hyperglycemia  Contact provider if insulin prescribed is not the preferred insulin per insurance. (Patient taking differently: Inject 34 Units under the skin at bedtime. 11.65 Type 2 with hyperglycemia  Contact provider if insulin prescribed is not the preferred insulin per insurance.      ) 15 mL 0     No current facility-administered medications for this visit.        For most current and accurate medication list, please contact the skilled nursing facility that this patient visit took place at.      Discharge Plan:  Patient is stable to discharge to home with family support and home care services.  She will need to follow up with her PMD in the next 7-10 days for ongoing osteoporosis workup, DM management as her BS may increase when she returns to home diet.     Review of Systems   Patient denies fever, chills, headache, lightheadedness, dizziness, rhinorrhea, cough, congestion, shortness of breath, chest pain, palpitations, abdominal pain, n/v, diarrhea, constipation, change in appetite, dysuria, frequency, burning or pain with urination.  Other than stated in HPI all other review of systems is negative.         Physical Exam   Vitals: /70, HR 78, resp 18, temp 97.3  GENERAL APPEARANCE: Well developed, obese woman in no acute distress  HEENT: normocephalic, atraumatic  PERRL, sclerae anicteric, conjunctivae clear and moist, EOM intact  LUNGS: lungs CTA, respiratory effort normal.  CARD: RRR, S1, S2, without murmurs, gallops, rubs,    Abdomen: soft, non-distended, non-tender, +BS x 4   MSK: foot drop right foot  EXTREMITIES: No cyanosis, clubbing or edema.   No s/s of DVT  NEURO: Alert and oriented, face is symmetric.  Good lower extremity sensation.   SKIN: no new lesions, rashes or petechia   PSYCH: euthymic.  Labs: No results found for this or any previous visit (from the past 240 hour(s)).      Assessment:  1. Closed compression fracture of third lumbar vertebra, sequela     2. S/P laminectomy     3. Type 2 diabetes mellitus with hyperglycemia, with long-term current use of insulin (H)     4. Age-related osteoporosis with current pathological fracture with routine healing, subsequent encounter     5. Vitamin D deficiency     6. Bilateral foot-drop     7. Falls frequently         MEDICAL EQUIPMENT NEEDS:  Walker and wheelchair      DISCHARGE PLAN/FACE TO FACE:  I certify that services are/were furnished while this patient was under the care of a physician and that a physician or an allowed non-physician practitioner (NPP), had a face-to-face encounter that meets the physician face-to-face encounter requirements. The encounter was in whole, or in part, related to the primary reason for home health. The patient is confined to his/her home and needs intermittent skilled nursing, physical therapy, speech-language pathology, or the continued need for occupational therapy. A plan of care has been established by a physician and is periodically reviewed by a physician.    I certify that this patient is under my care and that I, or a nurse practitioner or physician's assistant working with me, had a face-to-face encounter that meets the physician face-to-face encounter requirements with this patient.   Date of Face-to-Face Encounter: 6/3/2019    I certify that, based on my findings, the following services are medically necessary home health services: RN, PT, OT    My clinical findings support the need for the above skilled services because: (Please  write a brief narrative summary that describes what the RN, PT, SLP, or other services will be doing in the home. A list of diagnoses in this section does not meet the CMS requirements.) RN for medication management and diabetic teaching. PT/OT for ongoing strengthening, endurance and home safety eval.     This patient is homebound because: (Please write a brief narrative summary describing the functional limitations as to why this patient is homebound and specifically what makes this patient homebound.) Patient needs maximum assist in order to get out into the community for medical appointments.     The patient is, or has been, under my care and I have initiated the establishment of the plan of care. This patient will be followed by a physician who will periodically review the plan of care.    30 minutes total time spent with 25 minutes spent with patient and therapy in coordination of discharge plan.     Electronically signed by: Teri Brush CNP

## 2021-05-30 NOTE — PROGRESS NOTES
NEUROSURGERY FOLLOW UP EVALUATION:    ASSESSMENT/ PLAN:  Trinidad Brown is a 74 y.o. female,  who presents today status post Lumbar 3- Lumbar-4, Lumbar 4-Lumbar -5, Lumbar 5-Sacral 1, decompressive laminectomy for spinal stenosis by Dr Thomas on 3/29/2019. Patient also being treated in TLSO brace for L1 compression fracture she sustained around 3/24/19 during a fall. She has been taking Calcium, vitamin D and Alendronate for osteoporosis.    PLAN:  1.  May increase activity.  2.  May add 5 pounds weekly weight lifting.  3.  Continue physical therapy for leg strength and mobility.  4.  Call if back pain get worse. Now appears chronic.       Today she reports mild low back pain. Chronic in nature. Chronic right shoulder pain. She has not been wearing her brace and she's here without it. She has chronic bilateral foot drop. She has difficulty walking with therapy and tries not to walk.     EXAM:     Alert and oriented x3, speech clear via   Arm strength: 5/5 except right deltoid as has chronic shoulder immobility and pain.  Leg strength: 5/5 except bilateral foot drop  Bulk and tone: normal  Gait in wheelchair       IMAGING:  The imaging was personally reviewed by me and dicussed with Dr. [facility]. .   Evaluation performed upright in a brace.     Evaluation of bony detail limited by underpenetration and body habitus.     Status post lower lumbar laminectomies.     Mild (30%) superior endplate height loss of the L1 vertebral body remains unchanged. The remaining vertebral body heights are unchanged and unremarkable.     Retrolisthesis at L3-L4 is similar to prior. Dextroconvex lumbar curvature again noted.     Multilevel lumbar spondylosis is stable in appearance and again most pronounced at L3-L4 and L2-L3.     Sacrum poorly visualized.    Stephany Hough, FRED  [facility]

## 2021-05-30 NOTE — PATIENT INSTRUCTIONS - HE
1.  May increase activity.  2.  May add 5 pounds weekly weight lifting.  3.  Continue physical therapy for leg strength and mobility.  4.  Call if back pain get worse. Now appears chronic.

## 2021-05-31 ENCOUNTER — RECORDS - HEALTHEAST (OUTPATIENT)
Dept: ADMINISTRATIVE | Facility: CLINIC | Age: 76
End: 2021-05-31

## 2021-06-02 VITALS
HEIGHT: 64 IN | BODY MASS INDEX: 32.59 KG/M2 | BODY MASS INDEX: 32.59 KG/M2 | HEIGHT: 64 IN | BODY MASS INDEX: 38.71 KG/M2 | WEIGHT: 185 LBS | WEIGHT: 190.9 LBS | WEIGHT: 190.9 LBS | HEIGHT: 64 IN | BODY MASS INDEX: 34.02 KG/M2 | WEIGHT: 198.2 LBS | BODY MASS INDEX: 36.13 KG/M2

## 2021-06-02 VITALS — BODY MASS INDEX: 32.44 KG/M2 | HEIGHT: 64 IN | WEIGHT: 190 LBS

## 2021-06-02 VITALS — WEIGHT: 190.9 LBS | BODY MASS INDEX: 37.28 KG/M2

## 2021-06-03 VITALS — BODY MASS INDEX: 38.3 KG/M2 | HEIGHT: 59 IN | WEIGHT: 190 LBS

## 2021-06-03 VITALS — BODY MASS INDEX: 32.44 KG/M2 | HEIGHT: 64 IN | WEIGHT: 190 LBS

## 2021-06-15 PROBLEM — N39.0 UTI (URINARY TRACT INFECTION): Status: ACTIVE | Noted: 2017-05-11

## 2021-06-15 PROBLEM — R41.82 ALTERED MENTAL STATUS: Status: ACTIVE | Noted: 2017-05-10

## 2021-06-15 PROBLEM — N39.0 URINARY TRACT INFECTION: Status: ACTIVE | Noted: 2017-05-11

## 2021-06-16 PROBLEM — M48.00 SPINAL STENOSIS: Status: ACTIVE | Noted: 2019-03-25

## 2021-06-16 PROBLEM — M48.062 SPINAL STENOSIS, LUMBAR REGION, WITH NEUROGENIC CLAUDICATION: Status: ACTIVE | Noted: 2019-03-24

## 2021-06-16 PROBLEM — M48.062 LUMBAR STENOSIS WITH NEUROGENIC CLAUDICATION: Status: ACTIVE | Noted: 2019-03-24
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
## 2021-06-19 NOTE — LETTER
Letter by Teri Brush CNP at      Author: Teri Brush CNP Service: -- Author Type: --    Filed:  Encounter Date: 4/30/2019 Status: (Other)         Patient: Trinidad Brown   MR Number: [id number]   YOB: 1945   Date of Visit: 4/30/2019     Code Status:  DNR  Visit Type: Follow Up     Facility:  [facility] [[id number]]      Facility Type: SNF (Skilled Nursing Facility, TCU)    History of Present Illness:    Trinidad Brown is a 74 y.o. female was seen today for follow-up TCU visit.  She had a hospitalization at [facility] 3/20 424/2/2019.  She has a past medical history for 4 type 2 diabetes.  She had a fall at home with persistent pain and was seen in the ER.  She had a CT of her spine which showed significant spinal stenosis of her L3 to L5-S1.  An MRI showed lumbar spondylosis with mild acute active compression fracture and moderate edema.  She did have a disc herniation and ligamentous changes on MRI.  Neurosurgery was consulted and she was taken to surgery on 3/29/19 for an L-spine decompressive laminectomy.  Her incision site was stapled closed and postop course was complicated by acute blood loss anemia with a discharge hemoglobin of 8.7 down from baseline of 14.9.  He was also treated for an E. coli UTI with 5 days of amoxicillin.  She was discharged to the TCU with orders to wear a TLSO when out of bed.  She also had significant left foot drop which per patient was chronic and an AFO was ordered.  She did show cognitive impairment however due to cultural issues they were unable to do good cognitive testing while in the hospital.  Her daughter had reported to hospital staff that her memory issues were declining the past couple months.  It was also recommended that she have osteoporosis workup as an outpatient.    Today, I met with her with a hmong speaking staff.  Nursing reports she had a fall over the weekend in which she had slid to the floor without injury and her vital  signs were stable.  Yesterday again she had a fall and so a spinal x-ray was done which showed no acute findings and no new fractures.  UA UC was also obtained and the UA showed bacteria still awaiting UC results.  We will start her on Bactrim x7 days preemptively as she was recently treated for UTI with Cipro and while she was in the hospital treated with amoxicillin.  She denies any urinary symptoms of dysuria, increased frequency or changes in her urine.  She denies any back pain and is sitting up with her TLSO on.  Her Dilaudid was discontinued last week as she no longer needs.  Blood sugars are well managed between 100-200 for most of her blood sugars.    Current Outpatient Medications   Medication Sig Dispense Refill   ? acetaminophen (TYLENOL) 325 MG tablet Take 650 mg by mouth every 6 (six) hours as needed for pain.     ? atorvastatin (LIPITOR) 40 MG tablet Take 40 mg by mouth at bedtime.     ? calcium carbonate-vitamin D3 (CALCIUM WITH VITAMIN D) 600 mg(1,500mg) -400 unit per tablet Take 1 tablet by mouth 2 (two) times a day.     ? LANTUS SOLOSTAR U-100 INSULIN 100 unit/mL (3 mL) pen Inject 45 Units under the skin at bedtime. 11.65 Type 2 with hyperglycemia  Contact provider if insulin prescribed is not the preferred insulin per insurance. (Patient taking differently: Inject 38 Units under the skin at bedtime. 11.65 Type 2 with hyperglycemia  Contact provider if insulin prescribed is not the preferred insulin per insurance.      ) 15 mL 0   ? metFORMIN (GLUCOPHAGE) 1000 MG tablet Take 1,000 mg by mouth 2 (two) times a day with meals.     ? polyethylene glycol (MIRALAX) 17 gram packet Take 1 packet (17 g total) by mouth 2 (two) times a day. (Patient taking differently: Take 17 g by mouth daily as needed.       ) 60 packet 0   ? senna-docusate (PERICOLACE) 8.6-50 mg tablet Take 1 tablet by mouth 2 (two) times a day. 60 tablet 0   ? sitaGLIPtin (JANUVIA) 100 MG tablet Take 100 mg by mouth daily.       No  current facility-administered medications for this visit.      No Known Allergies  Immunization History   Administered Date(s) Administered   ? Influenza high dose, seasonal 03/26/2019   ? Influenza, inj, historic,unspecified 09/16/2015         Review of Systems   Patient denies fever, chills, headache, lightheadedness, dizziness, rhinorrhea, cough, congestion, shortness of breath, chest pain, palpitations, abdominal pain, n/v, diarrhea, constipation, change in appetite, dysuria, frequency, burning or pain with urination.  Other than stated in HPI all other review of systems is negative.             Physical Exam   Vital signs: /60, heart rate 72, respiratory 16, temp 97.7.   GENERAL APPEARANCE: Well developed, obese woman in no acute distress  HEENT: normocephalic, atraumatic  PERRL, sclerae anicteric, conjunctivae clear and moist, EOM intact  LUNGS: Lung sounds CTA, no adventitious sounds, respiratory effort normal.  CARD: RRR, S1, S2, without murmurs, gallops, rubs,   ABD: Soft and nontender with normal bowel sounds.   MSK: foot drop right foot  EXTREMITIES: No cyanosis, clubbing or edema.  Good CMS with positive pulses to lower extremities. No s/s of DVT  NEURO: Alert and oriented, face is symmetric.  Good lower extremity sensation.   SKIN: no new skin issues.    PSYCH: euthymic            Labs:    Recent Results (from the past 240 hour(s))   Urinalysis   Result Value Ref Range    Color, UA Yellow Colorless, Yellow, Straw, Light Yellow    Clarity, UA Cloudy (!) Clear    Glucose, UA Negative Negative    Bilirubin, UA Negative Negative    Ketones, UA Negative Negative    Specific Gravity, UA 1.020 1.001 - 1.030    Blood, UA Negative Negative    pH, UA 6.5 4.5 - 8.0    Protein, UA Trace (!) Negative mg/dL    Urobilinogen, UA <2.0 E.U./dL <2.0 E.U./dL, 2.0 E.U./dL    Nitrite, UA Positive (!) Negative    Leukocytes, UA Large (!) Negative    Bacteria, UA Many (!) None Seen hpf    RBC, UA 0-2 None Seen, 0-2 hpf     WBC, UA  (!) None Seen, 0-5 hpf    Squam Epithel, UA 0-5 None Seen, 0-5 lpf    Mucus, UA Many (!) None Seen lpf           Assessment:  1. S/P laminectomy     2. Closed compression fracture of third lumbar vertebra, sequela     3. Pain management     4. Type 2 diabetes mellitus with hyperglycemia, with long-term current use of insulin (H)     5. Acute cystitis without hematuria         Plan:   For her compression fracture she needs to continue wearing her TLSO and working with therapy.  Therapy will be obtaining custom AFOs for her ongoing foot drop.    Pain: Controlled and not using medications.  Continue with therapies.    Diabetes: Continue with metformin 1000 mg twice daily, Januvia 100 mg daily, and Lantus at 38 units daily.  If she is consistently under 200s could consider decreasing Lantus dose further.    UTI: We will treat with Bactrim as she has been treated with Cipro in the past awaiting UC                Electronically signed by: Teri Brush, CNP

## 2021-06-19 NOTE — LETTER
Letter by Kaila Ovalle MD at      Author: Kaila Ovalle MD Service: -- Author Type: --    Filed:  Encounter Date: 4/8/2019 Status: (Other)         Patient: Trinidad Brown   MR Number: [id number]   YOB: 1945   Date of Visit: 4/8/2019                               [facility]        Visit Type: H & P (Acute back pain with fall)    Code Status:  DNR  Facility:  [facility] [[id number]]          PCP: Rohini Alvarez MD       PHONE: [phone]     FAX:[phone]        ASSESSMENT/PLAN:  1. S/P laminectomy   stable with no progression of the patient's lower extremity weakness at this time.  The patient's pain centered on her surgical site.  We have shifted her narcotics from oxycodone to Dilaudid with better control of her pain.  PT/OT   2. Type 2 diabetes mellitus with hyperglycemia, with long-term current use of insulin (H)   blood sugars are stable with a.m. blood sugars range , very low blood sugars in the evenings range 87-89.  Will discontinue glipizide and consider tapering patient's Lantus at at bedtime.  Continue Januvia and metformin for now.   3. Acute cystitis without hematuria   patient is currently on ciprofloxacin to complete another 5-day course, no evidence for worsening symptoms   4. Acute blood loss anemia   asymptomatic, will recheck CBC on 4/9, no evidence of bleeding   5. Age-related osteoporosis with current pathological fracture with routine healing, subsequent encounter   bone DEXA scan as outpatient, check TSH and vitamin D levels.  Consider Fosamax, versus Miacalcin nasal spray which might help with patient's lumbar pain from her fractured site         HISTORY OF PRESENT ILLNESS:   Trinidad Brown is a 74 y.o. female with a history of type 2 diabetes, spinal stenosis who was admitted after falling causing acute back pain and sustained a left spine compression fracture at L1 which is exacerbated by her severe spinal stenosis of  the L-spine.  The patient underwent decompressive laminectomy on 3/29/19.  According to her family, she has had previous mechanical falls and has been unable to stand or ambulate for a few months, has had lower extremity weakness probably secondary to her severe spinal stenosis.  Post surgery, the patient did well with good surgical site healing except that she suffered acute blood loss anemia with hemoglobin dropping to 8.7 from 14.9..  Hemoglobin on 4/5 is at 8.8.  She still complains of bilateral lower extremity weakness.  She was able to tolerate TLSO brace.  Her blood sugars were high with a hemoglobin A1c of 11.3 and blood sugars in the 400s on admission.  The patient was on glipizide, metformin, Januvia at home and in the hospital she was started on Lantus at 45 units at at bedtime.  She also had cognitive impairment and she failed a mini cognitive assessment with a slums of 0.  Unclear if this is also secondary to cultural barrier for this patient.    Currently, the patient states that she still has a lot of pain especially when she turns from side to side, pain mainly on her surgical site.  She still complains of weakness bilateral lower extremities associated tingling and refused to do her physical therapy because of her weakness on her legs.  She denies any dizziness or lightheadedness or syncopal episodes.  She does not have any fevers or chills, cough, abdominal pain, diarrhea or constipation.  She states that she is sleeping poorly due to her pain.  Her appetite has been good but she occasionally complains of some nausea and she admitted that she vomited small amounts of food and is not tolerating her food well.  Interview with the patient was done with .    Other review of systems are negative.      PAST MEDICAL/SURGICAL HISTORY:  Past Medical History:   Diagnosis Date   ? Squamous cell carcinoma of back    ? Type 2 diabetes mellitus with hyperglycemia (H) 12/9/2015     Past Surgical  History:   Procedure Laterality Date   ? BACK SURGERY      tumor removal   ? LUMBAR LAMINECTOMY Bilateral 3/29/2019    Procedure: Lumbar 3- Lumbar-4, Lumbar 4-Lumbar -5, Lumbar 5-Sacral 1,  decompressive laminectomy.;  Surgeon: Naeem Thomas MD;  Location: [facility] [place];  Service: Spine       SOCIAL HISTORY: Patient is  and lives with her daughter and son-in-law and their kids.  She states that previous to her problems with her weakness of the lower extremities she was independent and walk around home.  Slowly but this became worse until she was not able to ambulate on her own and had been prone to falls.      FAMILY HISTORY:  Family History   Problem Relation Age of Onset   ? Stroke Daughter    ? Hypertension Daughter    ? No Medical Problems Son        MEDICATIONS:  Current Outpatient Medications on File Prior to Visit   Medication Sig   ? atorvastatin (LIPITOR) 40 MG tablet Take 40 mg by mouth at bedtime.   ? calcium carbonate-vitamin D3 (CALCIUM WITH VITAMIN D) 600 mg(1,500mg) -400 unit per tablet Take 1 tablet by mouth 2 (two) times a day.   ? glipiZIDE (GLUCOTROL) 5 MG tablet Take 5 mg by mouth 2 (two) times a day before meals.   ? HYDROmorphone (DILAUDID) 2 MG tablet Take 2 mg by mouth every 3 (three) hours as needed for pain. 0.5 to 1 tab every 3 hours prn   ? LANTUS SOLOSTAR U-100 INSULIN 100 unit/mL (3 mL) pen Inject 45 Units under the skin at bedtime. 11.65 Type 2 with hyperglycemia  Contact provider if insulin prescribed is not the preferred insulin per insurance.   ? metFORMIN (GLUCOPHAGE) 1000 MG tablet Take 1,000 mg by mouth 2 (two) times a day with meals.   ? polyethylene glycol (MIRALAX) 17 gram packet Take 1 packet (17 g total) by mouth 2 (two) times a day.   ? senna-docusate (PERICOLACE) 8.6-50 mg tablet Take 1 tablet by mouth 2 (two) times a day.   ? sitaGLIPtin (JANUVIA) 100 MG tablet Take 100 mg by mouth daily.     No current facility-administered medications on file prior to  visit.        ALLERGIES:  No Known Allergies      PHYSICAL EXAMINATION:  Vital signs 189 pounds, 110/66, 97.0, 83, 20, 93% room air  General: Awake, Alert, oriented to self, not in any form of acute distress, answers questions appropriately via , follows simple commands, obese  HEENT: Pink conjunctiva, anicteric sclerae, oral mucosa is moist  NECK: Supple, without any lymphadenopathy, thyromegaly or any masses  LUNG: Clear to auscultation, good chest expansion. There are no crackles, no wheezes, normal AP diameter  BACK: No kyphosis of the thoracic spine him a surgical site is clean without any redness or discharge or tenderness  CVS: There is good S1  S2, there are no murmurs, no heaves, rhythm is regular  ABDOMEN: Globular and soft, nontender to palpation, no organomegaly, good bowel sounds  EXTREMITIES: Good range of motion on both upper and lower extremities with foot drop noted on left foot greater than right, trace pedal edema, no cyanosis or clubbing, no calf tenderness  SKIN: Warm and dry, no rashes  noted    LABS:  Lab Results   Component Value Date    WBC 7.3 04/09/2019    HGB 10.7 (L) 04/09/2019    HCT 33.3 (L) 04/09/2019    MCV 99 04/09/2019     04/09/2019     Lab Results   Component Value Date    CREATININE 0.61 03/31/2019    BUN 11 03/31/2019     03/31/2019    K 3.7 03/31/2019     (H) 03/31/2019    CO2 27 03/31/2019           >45 minutes of total time spent, greater than 55% of the time spent in coordination of care and counseling regarding the above medical issues and plan of care including discussion with nursing staff regarding her current use of insulin, blood sugar monitoring, continued physical therapy.  Also discussed with patient regarding diabetes and treatment.  She states that she was not aware that she is on insulin and that she would prefer to be off it when she goes home. I have reviewed the patient's medical records, labs and medications.        Electronically signed by:Kaila Ovalle MD

## 2021-06-19 NOTE — LETTER
Letter by Teri Brush CNP at      Author: Teri Brush CNP Service: -- Author Type: --    Filed:  Encounter Date: 5/9/2019 Status: (Other)         Patient: Trinidad Brown   MR Number: [id number]   YOB: 1945   Date of Visit: 5/9/2019     Code Status:  DNR  Visit Type: Follow Up     Facility:  [facility] [[id number]]      Facility Type: SNF (Skilled Nursing Facility, TCU)    History of Present Illness:    Trinidad Brown is a 74 y.o. female was seen today for follow-up TCU visit.  She had a hospitalization at [facility] 3/20 424/2/2019.  She has a past medical history for 4 type 2 diabetes.  She had a fall at home with persistent pain and was seen in the ER.  She had a CT of her spine which showed significant spinal stenosis of her L3 to L5-S1.  An MRI showed lumbar spondylosis with mild acute active compression fracture and moderate edema.  She did have a disc herniation and ligamentous changes on MRI.  Neurosurgery was consulted and she was taken to surgery on 3/29/19 for an L-spine decompressive laminectomy.  Her incision site was stapled closed and postop course was complicated by acute blood loss anemia with a discharge hemoglobin of 8.7 down from baseline of 14.9.  He was also treated for an E. coli UTI with 5 days of amoxicillin.  She was discharged to the TCU with orders to wear a TLSO when out of bed.  She also had significant left foot drop which per patient was chronic and an AFO was ordered.  She did show cognitive impairment however due to cultural issues they were unable to do good cognitive testing while in the hospital.  Her daughter had reported to hospital staff that her memory issues were declining the past couple months.  It was also recommended that she have osteoporosis workup as an outpatient.    Today, I examine the patient with a video .  She denies any discomfort or complaints.  She has had a 12lb weight loss and when asked she states she just  doesn't feel like eating.  She says she likes the food just fine but does not have the appetite.  Her BS are trending down.  Staff tell me she had a fall in the bathroom yesterday after her and her daughter tried to transfer her to the bathroom without requesting staff help.  I discussed with her and she states she was not injured and has no residual discomforts today.  I counseled her on being sure to ask for staff help as she is not strong enough to transfer herself.  PT states she will be getting her AFO braces today which will hopefully continue her functional progress.       Current Outpatient Medications   Medication Sig Dispense Refill   ? acetaminophen (TYLENOL) 325 MG tablet Take 650 mg by mouth every 6 (six) hours as needed for pain.     ? atorvastatin (LIPITOR) 40 MG tablet Take 40 mg by mouth at bedtime.     ? calcium carbonate-vitamin D3 (CALCIUM WITH VITAMIN D) 600 mg(1,500mg) -400 unit per tablet Take 1 tablet by mouth 2 (two) times a day.     ? LANTUS SOLOSTAR U-100 INSULIN 100 unit/mL (3 mL) pen Inject 45 Units under the skin at bedtime. 11.65 Type 2 with hyperglycemia  Contact provider if insulin prescribed is not the preferred insulin per insurance. (Patient taking differently: Inject 32 Units under the skin at bedtime. 11.65 Type 2 with hyperglycemia  Contact provider if insulin prescribed is not the preferred insulin per insurance.      ) 15 mL 0   ? metFORMIN (GLUCOPHAGE) 1000 MG tablet Take 1,000 mg by mouth 2 (two) times a day with meals.     ? sitaGLIPtin (JANUVIA) 100 MG tablet Take 100 mg by mouth daily.       No current facility-administered medications for this visit.      No Known Allergies  Immunization History   Administered Date(s) Administered   ? Influenza high dose, seasonal 03/26/2019   ? Influenza, inj, historic,unspecified 09/16/2015         Review of Systems   Patient denies fever, chills, headache, lightheadedness, dizziness, rhinorrhea, cough, congestion, shortness of  breath, chest pain, palpitations, abdominal pain, n/v, diarrhea, constipation, change in appetite, dysuria, frequency, burning or pain with urination.  Other than stated in HPI all other review of systems is negative.       Physical Exam   Vital signs: /63, HR 89, resp 20, temp 97.6    GENERAL APPEARANCE: Well developed, obese woman in no acute distress  HEENT: normocephalic, atraumatic  PERRL, sclerae anicteric, conjunctivae clear and moist, EOM intact  LUNGS: lungs CTA, respiratory effort normal.  CARD: RRR, S1, S2, without murmurs, gallops, rubs,   Abdomen: soft, non-distended, non-tender, +BS x 4   MSK: foot drop right foot  EXTREMITIES: No cyanosis, clubbing or edema.   No s/s of DVT  NEURO: Alert and oriented, face is symmetric.  Good lower extremity sensation.   SKIN: no new lesions, rashes or petechia   PSYCH: euthymic            Labs:    No results found for this or any previous visit (from the past 240 hour(s)).        Assessment:  1. S/P laminectomy     2. Closed compression fracture of third lumbar vertebra, sequela     3. Type 2 diabetes mellitus with hyperglycemia, with long-term current use of insulin (H)     4. Bilateral foot-drop     5. Falls frequently         Plan:   She will need to continue with therapy in order to increase her function for progression towards discharge.  Awaiting AFOs.  She has had 4 falls since her admission.  I counseled her on the risk of falls with her proposed osteoporosis and fractures.  I instructed her to be sure to call for help when needed.  She agreed. Her lack of appetite is concerning as it is affecting her weight and blood sugars.  I will have dietician consult for food options and calorie counting.  I will order dietary supplements two times a day and decreased her Lantus to 32u at HS.                  Electronically signed by: Teri Brush, EDDIE

## 2021-06-19 NOTE — LETTER
Letter by Teri Brush CNP at      Author: Teri Brush CNP Service: -- Author Type: --    Filed:  Encounter Date: 4/3/2019 Status: (Other)         Patient: Trinidad Brown   MR Number: [id number]   YOB: 1945   Date of Visit: 4/3/2019     Code Status:  DNR  Visit Type: Follow Up     Facility:  [facility] [[id number]]      Facility Type: SNF (Skilled Nursing Facility, TCU)    History of Present Illness:   Hospital Admission Date: 3/24/2019  Hospital Discharge Date: 4/2/2019  Facility Admission Date: 4/2/2019    Trinidad Brown is a 73 y.o. female seen today as initial follow-up TCU visit following a hospitalization at [facility] 3/20 424/2/2019.  She has a past medical history for 4 type 2 diabetes.  She had a fall at home with persistent pain and was seen in the ER.  She had a CT of her spine which showed significant spinal stenosis of her L3 to L5-S1.  An MRI showed lumbar spondylosis with mild acute active compression fracture and moderate edema.  She did have a disc herniation and ligamentous changes on MRI.  Neurosurgery was consulted and she was taken to surgery on 3/29/19 for an L-spine decompressive laminectomy.  Her incision site was stapled closed and postop course was complicated by acute blood loss anemia with a discharge hemoglobin of 8.7 down from baseline of 14.9.  He was also treated for an E. coli UTI with 5 days of amoxicillin.  She was discharged to the TCU with orders to wear a TLSO when out of bed.  She also had significant left foot drop which per patient was chronic and an AFO was ordered.  She did show cognitive impairment however due to cultural issues they were unable to do good cognitive testing while in the hospital.  Her daughter had reported to hospital staff that her memory issues were declining the past couple months.  She is to follow-up with neuro surgery in 1 week for an incision check and have her staples removed in 3 weeks.  It was also recommended  that she have osteoporosis workup as an outpatient.    Today, she is in significant pain however she is unable to really describe the type of pain except for its in her low back and radiating down her legs.  She takes oxycodone 5-10 mg every 3 hours and she is unable to articulate with an  if this is helping her at all.  He did have a fall upon admission to the TCU last night with increase in pain so was sent to the ER which found no acute changes on CT and so she was discharged back to TCU.  This morning nursing reports she was agitated with their attempt to ambulate her and so she slipped to the floor without injury.  Vital signs are stable however I believe her cognitive status could be limiting her understanding in her rehabilitation.  I was present during her PT/OT evaluation and it was very difficult for her to sit up with her TLSO on as it did slip and twist.  This was removed and a tight abdominal binder was placed in order to see if she could tolerate and she was only able to stand with a 3 person assist for seconds.    Past Medical History:   Diagnosis Date   ? Squamous cell carcinoma of back    ? Type 2 diabetes mellitus with hyperglycemia (H) 12/9/2015     Past Surgical History:   Procedure Laterality Date   ? BACK SURGERY      tumor removal   ? LUMBAR LAMINECTOMY Bilateral 3/29/2019    Procedure: Lumbar 3- Lumbar-4, Lumbar 4-Lumbar -5, Lumbar 5-Sacral 1,  decompressive laminectomy.;  Surgeon: Naeem Thomas MD;  Location: [facility] [place];  Service: Spine     Family History   Problem Relation Age of Onset   ? Stroke Daughter    ? Hypertension Daughter    ? No Medical Problems Son      Social History     Socioeconomic History   ? Marital status: Single     Spouse name: Not on file   ? Number of children: Not on file   ? Years of education: Not on file   ? Highest education level: Not on file   Occupational History   ? Not on file   Social Needs   ? Financial resource strain: Not on file    ? Food insecurity:     Worry: Not on file     Inability: Not on file   ? Transportation needs:     Medical: Not on file     Non-medical: Not on file   Tobacco Use   ? Smoking status: Never Smoker   ? Smokeless tobacco: Never Used   Substance and Sexual Activity   ? Alcohol use: No   ? Drug use: No   ? Sexual activity: Yes     Birth control/protection: Post-menopausal   Lifestyle   ? Physical activity:     Days per week: Not on file     Minutes per session: Not on file   ? Stress: Not on file   Relationships   ? Social connections:     Talks on phone: Not on file     Gets together: Not on file     Attends [employer] service: Not on file     Active member of club or organization: Not on file     Attends meetings of clubs or organizations: Not on file     Relationship status: Not on file   ? Intimate partner violence:     Fear of current or ex partner: Not on file     Emotionally abused: Not on file     Physically abused: Not on file     Forced sexual activity: Not on file   Other Topics Concern   ? Not on file   Social History Narrative   ? Not on file       Current Outpatient Medications   Medication Sig Dispense Refill   ? HYDROmorphone (DILAUDID) 2 MG tablet Take 2 mg by mouth every 3 (three) hours as needed for pain. 0.5 to 1 tab every 3 hours prn     ? atorvastatin (LIPITOR) 40 MG tablet Take 40 mg by mouth at bedtime.     ? calcium carbonate-vitamin D3 (CALCIUM WITH VITAMIN D) 600 mg(1,500mg) -400 unit per tablet Take 1 tablet by mouth 2 (two) times a day.     ? glipiZIDE (GLUCOTROL) 5 MG tablet Take 5 mg by mouth 2 (two) times a day before meals.     ? LANTUS SOLOSTAR U-100 INSULIN 100 unit/mL (3 mL) pen Inject 45 Units under the skin at bedtime. 11.65 Type 2 with hyperglycemia  Contact provider if insulin prescribed is not the preferred insulin per insurance. 15 mL 0   ? metFORMIN (GLUCOPHAGE) 1000 MG tablet Take 1,000 mg by mouth 2 (two) times a day with meals.     ? polyethylene glycol (MIRALAX) 17 gram  packet Take 1 packet (17 g total) by mouth 2 (two) times a day. 60 packet 0   ? senna-docusate (PERICOLACE) 8.6-50 mg tablet Take 1 tablet by mouth 2 (two) times a day. 60 tablet 0   ? sitaGLIPtin (JANUVIA) 100 MG tablet Take 100 mg by mouth daily.       No current facility-administered medications for this visit.      No Known Allergies  Immunization History   Administered Date(s) Administered   ? Influenza high dose, seasonal 03/26/2019   ? Influenza, inj, historic,unspecified 09/16/2015         Review of Systems   Review of systems is difficult as she is not always answering questions with the  and moaning during assessment.  She does not answer many of the questions except the ones related to her back pain.    Physical Exam   Vital signs: /64, heart rate 98, respiratory 20, temp 98.4, weight 189 pounds, blood sugars are good in the 120-150s.  GENERAL APPEARANCE: Well developed, obese woman moaning in her bed.   HEENT: normocephalic, atraumatic  PERRL, sclerae anicteric, conjunctivae clear and moist, EOM intact  LUNGS: Lung sounds CTA, no adventitious sounds, respiratory effort normal.  CARD: RRR, S1, S2, without murmurs, gallops, rubs, no JVD,   ABD: Soft and nontender with normal bowel sounds.   MSK: Lower extremities show strength of 4/5, upper extremities show normal strength.  EXTREMITIES: No cyanosis, clubbing or edema.  NEURO: Alert and tenable cognitive impairment. Face is symmetric.  Good lower extremity sensation.  Foot drop.  Able to move toes.  SKIN: Inspection of the skin reveals no rashes, ulcerations or petechiae.  PSYCH: euthymic            Labs:    Recent Results (from the past 240 hour(s))   POCT Glucose   Result Value Ref Range    Glucose 310 (H) 70 - 139 mg/dL   POCT Glucose   Result Value Ref Range    Glucose 144 (H) 70 - 139 mg/dL   Basic Metabolic Panel   Result Value Ref Range    Sodium 134 (L) 136 - 145 mmol/L    Potassium 3.7 3.5 - 5.0 mmol/L    Chloride 104 98 - 107  mmol/L    CO2 21 (L) 22 - 31 mmol/L    Anion Gap, Calculation 9 5 - 18 mmol/L    Glucose 293 (H) 70 - 125 mg/dL    Calcium 8.6 8.5 - 10.5 mg/dL    BUN 7 (L) 8 - 28 mg/dL    Creatinine 0.73 0.60 - 1.10 mg/dL    GFR MDRD Af Amer >60 >60 mL/min/1.73m2    GFR MDRD Non Af Amer >60 >60 mL/min/1.73m2   HM1 (CBC with Diff)   Result Value Ref Range    WBC 5.9 4.0 - 11.0 thou/uL    RBC 4.09 3.80 - 5.40 mill/uL    Hemoglobin 13.0 12.0 - 16.0 g/dL    Hematocrit 38.4 35.0 - 47.0 %    MCV 94 80 - 100 fL    MCH 31.8 27.0 - 34.0 pg    MCHC 33.9 32.0 - 36.0 g/dL    RDW 12.0 11.0 - 14.5 %    Platelets 181 140 - 440 thou/uL    MPV 9.2 8.5 - 12.5 fL    Neutrophils % 51 50 - 70 %    Lymphocytes % 36 20 - 40 %    Monocytes % 11 (H) 2 - 10 %    Eosinophils % 2 0 - 6 %    Basophils % 0 0 - 2 %    Neutrophils Absolute 3.0 2.0 - 7.7 thou/uL    Lymphocytes Absolute 2.1 0.8 - 4.4 thou/uL    Monocytes Absolute 0.6 0.0 - 0.9 thou/uL    Eosinophils Absolute 0.1 0.0 - 0.4 thou/uL    Basophils Absolute 0.0 0.0 - 0.2 thou/uL   POCT Glucose - 4 times daily before meals and at bedtime   Result Value Ref Range    Glucose 262 (H) 70 - 139 mg/dL   Echo Complete   Result Value Ref Range    LV volume diastolic 32.1 (!) 46 - 106 cm3    LV volume systolic 10.8 (!) 14 - 42 cm3    HR 71 bpm    IVSd 1.21 (!) 0.6 - 0.9 cm    LVIDd 4.4 3.8 - 5.2 cm    LVIDs 2.83 2.2 - 3.5 cm    LVOT diam 2.1 cm    LVOT mean gradient 4 mmHg    LVOT peak VTI 28.5 cm    LVOT mean court 90.5 cm/s    LVOT peak court 123 cm/s    LVOT peak gradient 6 mmHg    LV PWd 1.27 (!) 0.6 - 0.9 cm    MV E' lat court 5 cm/s    MV E' med court 5.33 cm/s    AR decel slope 2,710 mm/s2    AR p 1/2 time 658 ms    AR peak court 440 cm/s    AV peak court 172 cm/s    AV cusp sep 1.5 cm    AV cusp sep 1.5 cm    AV mean court 119 cm/s    AV mean gradient 6 mmHg    AV VTI 34.1 cm    AO root 3 cm    LA size 4 cm    MV decel slope 4,440 mm/s2    MV decel time 208 ms    MV P 1/2 time 64 ms    MV peak A court 114 cm/s    MV peak  E court 82.5 cm/s    MV mean court 70.1 cm/s    MV mean gradient 2 mmHg    MV VTI 30.8 cm    MV peak velocityoctiy 121 cm/s    TR peak court 286 cm/s    BSA 2.01 m2    Hieght 64 in    Weight 3,054.4 lbs    /75 mmHg    IVS/PW ratio 1.0     TR peak gradent 32.7 mmHg    LV FS 35.7 28 - 44 %    Echo LVEF calculated 66 55 - 75 %    LA volume 44.5 mL    LV mass 200.7 g    AV area 2.9 cm2    AV DIM IND court 0.7     MV area p 1/2 time 3.4 cm2    MV area cont eq 3.2 cm2    MV E/A Ratio 0.7     LVOT area 3.46 cm2    LVOT SV 98.7 cm3    AV peak gradient 11.8 mmHg    MV peak gradient 5.9 mmHg    LV systolic volume index 5.4 8 - 24 cm3/m2    LV diastolic volume index 16.0 29 - 61 cm3/m2    LA volume index 22.2 mL/m2    LV mass index 99.8 g/m2    LV SVi 49.1 ml/m2    TAPSE 1.7 cm    MV med E/e' ratio 15.5     MV lat E/e' ratio 16.5     LV CO 7.0 l/min    LV Ci 3.5 l/min/m2    LA area 2 15.3 cm2    LA area 1 16.1 cm2    Height 64.0 in    Weight 191 lbs    MV Avg E/e' Ratio 16.0 cm/s    LA length 4.7 cm    AR peak gradient 77.4 mmHg    AV DIM IND VTI 0.8     MVA VTI 3.20 cm2   POCT Glucose - 4 times daily before meals and at bedtime   Result Value Ref Range    Glucose 276 (H) 70 - 139 mg/dL   POCT Glucose - 4 times daily before meals and at bedtime   Result Value Ref Range    Glucose 166 (H) 70 - 139 mg/dL   POCT Glucose - 4 times daily before meals and at bedtime   Result Value Ref Range    Glucose 273 (H) 70 - 139 mg/dL   Basic Metabolic Panel   Result Value Ref Range    Sodium 138 136 - 145 mmol/L    Potassium 4.4 3.5 - 5.0 mmol/L    Chloride 106 98 - 107 mmol/L    CO2 25 22 - 31 mmol/L    Anion Gap, Calculation 7 5 - 18 mmol/L    Glucose 199 (H) 70 - 125 mg/dL    Calcium 8.9 8.5 - 10.5 mg/dL    BUN 6 (L) 8 - 28 mg/dL    Creatinine 0.70 0.60 - 1.10 mg/dL    GFR MDRD Af Amer >60 >60 mL/min/1.73m2    GFR MDRD Non Af Amer >60 >60 mL/min/1.73m2   Glycosylated Hemoglobin A1C   Result Value Ref Range    Hemoglobin A1c 11.3 (H) 4.2 -  6.1 %   INR   Result Value Ref Range    INR 1.04 0.90 - 1.10   APTT(PTT)   Result Value Ref Range    PTT 29 24 - 37 seconds   Platelet Function Test   Result Value Ref Range    PFA-COL/ 1 - 180 sec   Platelet Count - every other day x 3   Result Value Ref Range    Platelets 175 140 - 440 thou/uL   Type and Screen   Result Value Ref Range    ABORh O POS     Antibody Screen Negative Negative   POCT Glucose - 4 times daily before meals and at bedtime   Result Value Ref Range    Glucose 217 (H) 70 - 139 mg/dL   POCT Glucose   Result Value Ref Range    Glucose 193 (H) 70 - 139 mg/dL   POCT Glucose - 4 times daily before meals and at bedtime   Result Value Ref Range    Glucose 239 (H) 70 - 139 mg/dL   POCT Glucose - 4 times daily before meals and at bedtime   Result Value Ref Range    Glucose 154 (H) 70 - 139 mg/dL   POCT Glucose   Result Value Ref Range    Glucose 295 (H) 70 - 139 mg/dL   POCT Glucose   Result Value Ref Range    Glucose 198 (H) 70 - 139 mg/dL   HM2(CBC w/o Differential)   Result Value Ref Range    WBC 5.6 4.0 - 11.0 thou/uL    RBC 3.92 3.80 - 5.40 mill/uL    Hemoglobin 12.5 12.0 - 16.0 g/dL    Hematocrit 37.5 35.0 - 47.0 %    MCV 96 80 - 100 fL    MCH 31.9 27.0 - 34.0 pg    MCHC 33.3 32.0 - 36.0 g/dL    RDW 11.9 11.0 - 14.5 %    Platelets 184 140 - 440 thou/uL    MPV 9.2 8.5 - 12.5 fL   POCT Glucose   Result Value Ref Range    Glucose 193 (H) 70 - 139 mg/dL   POCT Glucose   Result Value Ref Range    Glucose 206 (H) 70 - 139 mg/dL   POCT Glucose - 4 times daily before meals and at bedtime   Result Value Ref Range    Glucose 264 (H) 70 - 139 mg/dL   POCT Glucose - 4 times daily before meals and at bedtime   Result Value Ref Range    Glucose 97 70 - 139 mg/dL   POCT Glucose - 4 times daily before meals and at bedtime   Result Value Ref Range    Glucose 203 (H) 70 - 139 mg/dL   POCT Glucose   Result Value Ref Range    Glucose 202 (H) 70 - 139 mg/dL   Basic Metabolic Panel   Result Value Ref Range     Sodium 138 136 - 145 mmol/L    Potassium 4.0 3.5 - 5.0 mmol/L    Chloride 107 98 - 107 mmol/L    CO2 23 22 - 31 mmol/L    Anion Gap, Calculation 8 5 - 18 mmol/L    Glucose 163 (H) 70 - 125 mg/dL    Calcium 8.6 8.5 - 10.5 mg/dL    BUN 8 8 - 28 mg/dL    Creatinine 0.66 0.60 - 1.10 mg/dL    GFR MDRD Af Amer >60 >60 mL/min/1.73m2    GFR MDRD Non Af Amer >60 >60 mL/min/1.73m2   HM2(CBC w/o Differential)   Result Value Ref Range    WBC 4.7 4.0 - 11.0 thou/uL    RBC 4.00 3.80 - 5.40 mill/uL    Hemoglobin 12.7 12.0 - 16.0 g/dL    Hematocrit 37.0 35.0 - 47.0 %    MCV 93 80 - 100 fL    MCH 31.8 27.0 - 34.0 pg    MCHC 34.3 32.0 - 36.0 g/dL    RDW 11.9 11.0 - 14.5 %    Platelets 193 140 - 440 thou/uL    MPV 8.7 8.5 - 12.5 fL   POCT Glucose   Result Value Ref Range    Glucose 156 (H) 70 - 139 mg/dL   POCT Glucose   Result Value Ref Range    Glucose 157 (H) 70 - 139 mg/dL   POCT Glucose - 4 times daily before meals and at bedtime   Result Value Ref Range    Glucose 241 (H) 70 - 139 mg/dL   POCT Glucose - 4 times daily before meals and at bedtime   Result Value Ref Range    Glucose 231 (H) 70 - 139 mg/dL   POCT Glucose - 4 times daily before meals and at bedtime   Result Value Ref Range    Glucose 65 (L) 70 - 139 mg/dL   POCT Glucose   Result Value Ref Range    Glucose 149 (H) 70 - 139 mg/dL   POCT Glucose   Result Value Ref Range    Glucose 77 70 - 139 mg/dL   POCT Glucose   Result Value Ref Range    Glucose 113 70 - 139 mg/dL   POCT Glucose   Result Value Ref Range    Glucose 162 (H) 70 - 139 mg/dL   HM2(CBC w/o Differential)   Result Value Ref Range    WBC 6.3 4.0 - 11.0 thou/uL    RBC 3.67 (L) 3.80 - 5.40 mill/uL    Hemoglobin 11.6 (L) 12.0 - 16.0 g/dL    Hematocrit 34.8 (L) 35.0 - 47.0 %    MCV 95 80 - 100 fL    MCH 31.6 27.0 - 34.0 pg    MCHC 33.3 32.0 - 36.0 g/dL    RDW 12.1 11.0 - 14.5 %    Platelets 220 140 - 440 thou/uL    MPV 8.9 8.5 - 12.5 fL   Glucose   Result Value Ref Range    Glucose 89 70 - 125 mg/dL    Patient  Fasting > 8hrs? Unknown    POCT Glucose - 4 times daily before meals and at bedtime   Result Value Ref Range    Glucose 112 70 - 139 mg/dL   POCT Glucose - 4 times daily before meals and at bedtime   Result Value Ref Range    Glucose 178 (H) 70 - 139 mg/dL   POCT Glucose - 4 times daily before meals and at bedtime   Result Value Ref Range    Glucose 243 (H) 70 - 139 mg/dL   Hemoglobin   Result Value Ref Range    Hemoglobin 9.9 (L) 12.0 - 16.0 g/dL   Platelet Count - every other day x 3   Result Value Ref Range    Platelets 191 140 - 440 thou/uL   POCT Glucose - 4 times daily before meals and at bedtime   Result Value Ref Range    Glucose 424 (H) 70 - 139 mg/dL   Glucose, Random   Result Value Ref Range    Glucose 416 (HH) 70 - 125 mg/dL    Patient Fasting > 8hrs? Unknown    POCT Glucose - 4 times daily before meals and at bedtime   Result Value Ref Range    Glucose 385 (H) 70 - 139 mg/dL   POCT Glucose - 4 times daily before meals and at bedtime   Result Value Ref Range    Glucose 435 (H) 70 - 139 mg/dL   POCT Glucose - 4 times daily before meals and at bedtime   Result Value Ref Range    Glucose 283 (H) 70 - 139 mg/dL   HM2(CBC w/o Differential)   Result Value Ref Range    WBC 13.1 (H) 4.0 - 11.0 thou/uL    RBC 2.75 (L) 3.80 - 5.40 mill/uL    Hemoglobin 8.7 (L) 12.0 - 16.0 g/dL    Hematocrit 26.1 (L) 35.0 - 47.0 %    MCV 95 80 - 100 fL    MCH 31.6 27.0 - 34.0 pg    MCHC 33.3 32.0 - 36.0 g/dL    RDW 12.4 11.0 - 14.5 %    Platelets 192 140 - 440 thou/uL    MPV 8.7 8.5 - 12.5 fL   Basic Metabolic Panel   Result Value Ref Range    Sodium 140 136 - 145 mmol/L    Potassium 3.7 3.5 - 5.0 mmol/L    Chloride 109 (H) 98 - 107 mmol/L    CO2 27 22 - 31 mmol/L    Anion Gap, Calculation 4 (L) 5 - 18 mmol/L    Glucose 126 (H) 70 - 125 mg/dL    Calcium 8.4 (L) 8.5 - 10.5 mg/dL    BUN 11 8 - 28 mg/dL    Creatinine 0.61 0.60 - 1.10 mg/dL    GFR MDRD Af Amer >60 >60 mL/min/1.73m2    GFR MDRD Non Af Amer >60 >60 mL/min/1.73m2   POCT  Glucose - 4 times daily before meals and at bedtime   Result Value Ref Range    Glucose 148 (H) 70 - 139 mg/dL   POCT Glucose - 4 times daily before meals and at bedtime   Result Value Ref Range    Glucose 148 (H) 70 - 139 mg/dL   POCT Glucose - 4 times daily before meals and at bedtime   Result Value Ref Range    Glucose 163 (H) 70 - 139 mg/dL   POCT Glucose - 4 times daily before meals and at bedtime   Result Value Ref Range    Glucose 160 (H) 70 - 139 mg/dL   HM2(CBC w/o Differential)   Result Value Ref Range    WBC 11.3 (H) 4.0 - 11.0 thou/uL    RBC 2.74 (L) 3.80 - 5.40 mill/uL    Hemoglobin 8.8 (L) 12.0 - 16.0 g/dL    Hematocrit 25.7 (L) 35.0 - 47.0 %    MCV 94 80 - 100 fL    MCH 32.1 27.0 - 34.0 pg    MCHC 34.2 32.0 - 36.0 g/dL    RDW 12.9 11.0 - 14.5 %    Platelets 196 140 - 440 thou/uL    MPV 9.3 8.5 - 12.5 fL   POCT Glucose - 4 times daily before meals and at bedtime   Result Value Ref Range    Glucose 245 (H) 70 - 139 mg/dL   POCT Glucose - 4 times daily before meals and at bedtime   Result Value Ref Range    Glucose 149 (H) 70 - 139 mg/dL   POCT Glucose - 4 times daily before meals and at bedtime   Result Value Ref Range    Glucose 118 70 - 139 mg/dL   POCT Glucose - 4 times daily before meals and at bedtime   Result Value Ref Range    Glucose 153 (H) 70 - 139 mg/dL   POCT Glucose - 4 times daily before meals and at bedtime   Result Value Ref Range    Glucose 91 70 - 139 mg/dL   POCT Glucose - 4 times daily before meals and at bedtime   Result Value Ref Range    Glucose 110 70 - 139 mg/dL         Assessment:  1. S/P laminectomy     2. Closed compression fracture of third lumbar vertebra, sequela     3. Pain management     4. Type 2 diabetes mellitus with hyperglycemia, with long-term current use of insulin (H)         Plan:   For her compression fracture and status post laminectomy: At this point therapy and mobility will be very difficult as she is unable to tolerate the TLSO due to poor fitting.  Will  have [employer] orthotics come out and trim TLSO as so it fits her short stature better.  Continue with therapy as able.  Did discuss this plan with neurosurgery nurse practitioner and she agrees.  Follow-up with neurosurgery next week for an incision check.    Pain management: I am going to switch her oxycodone to Dilaudid at this time in hopes of better coverage for her pain.  I will continue to assess her and if pain lightens enough may be able to discuss what she is feeling better.  Could consider a Medrol Dosepak for radiculopathy if continues to have increased pain.    Diabetes: We will continue to monitor blood sugars they are stable at this time.  Continue with glipizide 5 mg twice daily Januvia 100 mg daily, and metformin 1000 mg twice daily.  She is on atorvastatin 40 mg for lipidemia.    Total  35 minutes of which 30 minutes was spent with patient, staff and neurosurgery in coordination of clarification of discharge orders, TLSO adjustments, pain management and plan.      Electronically signed by: Teri Brush CNP

## 2021-06-19 NOTE — LETTER
Letter by Teri Brush CNP at      Author: Teri Brush CNP Service: -- Author Type: --    Filed:  Encounter Date: 4/25/2019 Status: (Other)         Patient: Trinidad Brown   MR Number: [id number]   YOB: 1945   Date of Visit: 4/25/2019     Code Status:  DNR  Visit Type: Follow Up     Facility:  [facility] [[id number]]      Facility Type: SNF (Skilled Nursing Facility, TCU)    History of Present Illness:    Trinidad Brown is a 74 y.o. female was seen today for follow-up TCU visit.  She had a hospitalization at [facility] 3/20 424/2/2019.  She has a past medical history for 4 type 2 diabetes.  She had a fall at home with persistent pain and was seen in the ER.  She had a CT of her spine which showed significant spinal stenosis of her L3 to L5-S1.  An MRI showed lumbar spondylosis with mild acute active compression fracture and moderate edema.  She did have a disc herniation and ligamentous changes on MRI.  Neurosurgery was consulted and she was taken to surgery on 3/29/19 for an L-spine decompressive laminectomy.  Her incision site was stapled closed and postop course was complicated by acute blood loss anemia with a discharge hemoglobin of 8.7 down from baseline of 14.9.  He was also treated for an E. coli UTI with 5 days of amoxicillin.  She was discharged to the TCU with orders to wear a TLSO when out of bed.  She also had significant left foot drop which per patient was chronic and an AFO was ordered.  She did show cognitive impairment however due to cultural issues they were unable to do good cognitive testing while in the hospital.  Her daughter had reported to hospital staff that her memory issues were declining the past couple months.  It was also recommended that she have osteoporosis workup as an outpatient.    Today, I met with patient with a That{img} video .  She denies any pain today. She as good range of motion and sensation to her lower extremities.  No s/s of  DVTs. She has not used dilaudid for pain in over a week. Her BS continue to be in good range with decreasing her Lantus.  Most of the BS are 90s-low 100s.     Current Outpatient Medications   Medication Sig Dispense Refill   ? acetaminophen (TYLENOL) 325 MG tablet Take 650 mg by mouth every 6 (six) hours as needed for pain.     ? atorvastatin (LIPITOR) 40 MG tablet Take 40 mg by mouth at bedtime.     ? calcium carbonate-vitamin D3 (CALCIUM WITH VITAMIN D) 600 mg(1,500mg) -400 unit per tablet Take 1 tablet by mouth 2 (two) times a day.     ? LANTUS SOLOSTAR U-100 INSULIN 100 unit/mL (3 mL) pen Inject 45 Units under the skin at bedtime. 11.65 Type 2 with hyperglycemia  Contact provider if insulin prescribed is not the preferred insulin per insurance. (Patient taking differently: Inject 38 Units under the skin at bedtime. 11.65 Type 2 with hyperglycemia  Contact provider if insulin prescribed is not the preferred insulin per insurance.      ) 15 mL 0   ? metFORMIN (GLUCOPHAGE) 1000 MG tablet Take 1,000 mg by mouth 2 (two) times a day with meals.     ? polyethylene glycol (MIRALAX) 17 gram packet Take 1 packet (17 g total) by mouth 2 (two) times a day. (Patient taking differently: Take 17 g by mouth daily as needed.       ) 60 packet 0   ? senna-docusate (PERICOLACE) 8.6-50 mg tablet Take 1 tablet by mouth 2 (two) times a day. 60 tablet 0   ? sitaGLIPtin (JANUVIA) 100 MG tablet Take 100 mg by mouth daily.       No current facility-administered medications for this visit.      No Known Allergies  Immunization History   Administered Date(s) Administered   ? Influenza high dose, seasonal 03/26/2019   ? Influenza, inj, historic,unspecified 09/16/2015         Review of Systems   Patient denies fever, chills, headache, lightheadedness, dizziness, rhinorrhea, cough, congestion, shortness of breath, chest pain, palpitations, abdominal pain, n/v, diarrhea, constipation, change in appetite, dysuria, frequency, burning or pain  with urination.  Other than stated in HPI all other review of systems is negative.             Physical Exam   Vital signs: /67, HR 96, resp 20, temp 97.0   GENERAL APPEARANCE: Well developed, obese woman in no acute distress  HEENT: normocephalic, atraumatic  PERRL, sclerae anicteric, conjunctivae clear and moist, EOM intact  LUNGS: Lung sounds CTA, no adventitious sounds, respiratory effort normal.  CARD: RRR, S1, S2, without murmurs, gallops, rubs,   ABD: Soft and nontender with normal bowel sounds.   EXTREMITIES: No cyanosis, clubbing or edema.  Good CMS with positive pulses to lower extremities. No s/s of DVT  NEURO: Alert and oriented, face is symmetric.  Good lower extremity sensation.   SKIN: no new skin issues.    PSYCH: euthymic            Labs:    Recent Results (from the past 240 hour(s))   T4, Free   Result Value Ref Range    Free T4 1.3 0.7 - 1.8 ng/dL   Thyroid Stimulating Hormone (TSH)   Result Value Ref Range    TSH 0.44 0.30 - 5.00 uIU/mL   Vitamin D, Total (25-Hydroxy)   Result Value Ref Range    Vitamin D, Total (25-Hydroxy) 23.4 (L) 30.0 - 80.0 ng/mL           Assessment:  1. S/P laminectomy     2. Closed compression fracture of third lumbar vertebra, sequela     3. Pain management     4. Type 2 diabetes mellitus with hyperglycemia, with long-term current use of insulin (H)         Plan:   Compression fracture and s/p laminectomy: continue wearing TLSO when out of bed, follow up with neurosurgery in the next 2 weeks. Continue therapies.     Pain management: DC dilaudid, change acetaminophen to 650mg every 6 hours prn.      DM: decrease Lantus to 38u at HS and monitor BS                Electronically signed by: Teri Brush, EDDIE

## 2021-06-19 NOTE — LETTER
Letter by Teri Brush CNP at      Author: Teri Brush CNP Service: -- Author Type: --    Filed:  Encounter Date: 5/14/2019 Status: (Other)         Patient: Trinidad Brown   MR Number: [id number]   YOB: 1945   Date of Visit: 5/14/2019     Code Status:  DNR  Visit Type: Follow Up     Facility:  [facility] [[id number]]      Facility Type: SNF (Skilled Nursing Facility, TCU)    History of Present Illness:    Trinidad Brown is a 74 y.o. female was seen today for follow-up TCU visit.  She had a hospitalization at [facility] 3/20 424/2/2019.  She has a past medical history for 4 type 2 diabetes.  She had a fall at home with persistent pain and was seen in the ER.  She had a CT of her spine which showed significant spinal stenosis of her L3 to L5-S1.  An MRI showed lumbar spondylosis with mild acute active compression fracture and moderate edema.  She did have a disc herniation and ligamentous changes on MRI.  Neurosurgery was consulted and she was taken to surgery on 3/29/19 for an L-spine decompressive laminectomy.  Her incision site was stapled closed and postop course was complicated by acute blood loss anemia with a discharge hemoglobin of 8.7 down from baseline of 14.9.  He was also treated for an E. coli UTI with 5 days of amoxicillin.  She was discharged to the TCU with orders to wear a TLSO when out of bed.  She also had significant left foot drop which per patient was chronic and an AFO was ordered.  She did show cognitive impairment however due to cultural issues they were unable to do good cognitive testing while in the hospital.  Her daughter had reported to hospital staff that her memory issues were declining the past couple months.  It was also recommended that she have osteoporosis workup as an outpatient.    Today, I examine the patient with a video .  She denies any discomfort or complaints.  The diabetic oral supplements have not come however nursing reports  she is eating better this week.  Her BS are trending up in the 200s.  She was seen by neurosurgery yesterday and her vertebral fracture is not healed and they ordered to continue with TLSO x 4 more weeks.  Patient is very disheartened by this as she feels she is not getting better.  She still not have the AFOs that were suppose to come last week so her therapy has been quite delayed and poor progress due to restriction of TLSO and not have the AFOs.      Current Outpatient Medications   Medication Sig Dispense Refill   ? alendronate (FOSAMAX) 10 MG tablet Take 70 mg by mouth once a week. Take in the morning on an empty stomach with a full glass of water 30 minutes before food     ? acetaminophen (TYLENOL) 325 MG tablet Take 650 mg by mouth every 6 (six) hours as needed for pain.     ? atorvastatin (LIPITOR) 40 MG tablet Take 40 mg by mouth at bedtime.     ? calcium carbonate-vitamin D3 (CALCIUM WITH VITAMIN D) 600 mg(1,500mg) -400 unit per tablet Take 1 tablet by mouth 2 (two) times a day.     ? cholecalciferol, vitamin D3, 1,000 unit tablet Take 1,000 Units by mouth daily.     ? ergocalciferol (VITAMIN D2) 50,000 unit capsule Take 50,000 Units by mouth once a week.     ? LANTUS SOLOSTAR U-100 INSULIN 100 unit/mL (3 mL) pen Inject 45 Units under the skin at bedtime. 11.65 Type 2 with hyperglycemia  Contact provider if insulin prescribed is not the preferred insulin per insurance. (Patient taking differently: Inject 34 Units under the skin at bedtime. 11.65 Type 2 with hyperglycemia  Contact provider if insulin prescribed is not the preferred insulin per insurance.      ) 15 mL 0   ? metFORMIN (GLUCOPHAGE) 1000 MG tablet Take 1,000 mg by mouth 2 (two) times a day with meals.     ? polyethylene glycol (MIRALAX) 17 gram packet Take 17 g by mouth daily.     ? senna-docusate (PERICOLACE) 8.6-50 mg tablet Take 1 tablet by mouth daily.     ? sitaGLIPtin (JANUVIA) 100 MG tablet Take 100 mg by mouth daily.       No current  facility-administered medications for this visit.      No Known Allergies  Immunization History   Administered Date(s) Administered   ? Influenza high dose, seasonal 03/26/2019   ? Influenza, inj, historic,unspecified 09/16/2015         Review of Systems   Patient denies fever, chills, headache, lightheadedness, dizziness, rhinorrhea, cough, congestion, shortness of breath, chest pain, palpitations, abdominal pain, n/v, diarrhea, constipation, change in appetite, dysuria, frequency, burning or pain with urination.  Other than stated in HPI all other review of systems is negative.       Physical Exam   Vital signs: /75, HR 90, resp 20, temp 97.0   GENERAL APPEARANCE: Well developed, obese woman in no acute distress  HEENT: normocephalic, atraumatic  PERRL, sclerae anicteric, conjunctivae clear and moist, EOM intact  LUNGS: lungs CTA, respiratory effort normal.  CARD: RRR, S1, S2, without murmurs, gallops, rubs,   Abdomen: soft, non-distended, non-tender, +BS x 4   MSK: foot drop right foot  EXTREMITIES: No cyanosis, clubbing or edema.   No s/s of DVT  NEURO: Alert and oriented, face is symmetric.  Good lower extremity sensation.   SKIN: no new lesions, rashes or petechia   PSYCH: euthymic            Labs:    No results found for this or any previous visit (from the past 240 hour(s)).        Assessment:  1. S/P laminectomy     2. Closed compression fracture of third lumbar vertebra, sequela     3. Type 2 diabetes mellitus with hyperglycemia, with long-term current use of insulin (H)     4. Bilateral foot-drop     5. Age-related osteoporosis with current pathological fracture with routine healing, subsequent encounter         Plan:   Closed fracture:  She is to continue TLSO when out of bed.  Unsure if patient will comply with this or may just refuse to get out of bed as sitting the TLSO is uncomfortable. Awaiting AFOs for foot drop.  For the osteoporosis, I will start her on alendronate and she is on vit D for  Vit D deficiency. Counseled patient on medications and purpose.  Her BS are increasing so will increase Lantus to 34u.                  Electronically signed by: Teri Brush CNP

## 2021-06-19 NOTE — LETTER
Letter by Teri Brush CNP at      Author: Teri Brush CNP Service: -- Author Type: --    Filed:  Encounter Date: 6/3/2019 Status: (Other)         Patient: Trinidad Brown   MR Number: [id number]   YOB: 1945   Date of Visit: 6/3/2019     Code Status:  FULL CODE  Visit Type: Discharge Summary     Facility:  [facility] [[id number]]          PCP:  Rohini Alvarez MD  [phone]       Admission Date to our Facility: 4/2/2019 Discharge Date from our Facility: 6/4/2019    Discharge Diagnosis:    1. Closed compression fracture of third lumbar vertebra, sequela     2. S/P laminectomy     3. Type 2 diabetes mellitus with hyperglycemia, with long-term current use of insulin (H)     4. Age-related osteoporosis with current pathological fracture with routine healing, subsequent encounter     5. Vitamin D deficiency     6. Bilateral foot-drop     7. Falls frequently          History of Present Illness: Trinidad Brown is a 74 y.o. female with a pmh of diabetes. She had a hospitalization at [facility] 3/20 424/2/2019.She had a fall at home with persistent pain and was seen in the ER.  She had a CT of her spine which showed significant spinal stenosis of her L3 to L5-S1.  An MRI showed lumbar spondylosis with mild acute active compression fracture and moderate edema.  She did have a disc herniation and ligamentous changes on MRI.  Neurosurgery was consulted and she was taken to surgery on 3/29/19 for an L-spine decompressive laminectomy.  Her incision site was stapled closed and postop course was complicated by acute blood loss anemia with a discharge hemoglobin of 8.7 down from baseline of 14.9.  He was also treated for an E. coli UTI with 5 days of amoxicillin.  She was discharged to the TCU with orders to wear a TLSO when out of bed.  She also had significant left foot drop which per patient was chronic and an AFO was ordered.  She did show cognitive impairment however due to cultural issues they  were unable to do good cognitive testing while in the hospital.  Her daughter had reported to hospital staff that her memory issues were declining the past couple months.  It was also recommended that she have osteoporosis workup as an outpatient.    Skilled Nursing Facility Course:  While at the TCU she was started on Calcium and Vit D for Vit D deficiency.  She was also started on alendronate for presumed osteoporosis.  She will need a DEXA scan as an outpatient.  She has had frequent falls while at the TCU, due to getting up without asking for help,  in which she had multiple xrays showing no new fractures.  She was noted to have significant bilateral foot drop and so was fitted for custom AFOS which allows her to be more stable with transfers however being in the TLSO does extensively limit her mobility and she has not been able to work much on ambulation.  She was seen by NeuroSurgery 1 month after surgery and the vertebra fracture had not healed so it was recommend she continue with the TLSO x 6 more weeks.  She should follow up with neurosurgery in the next couple of weeks. She was treated for 2 different UTIs in which she exhibited no urinary symptoms however she did become more confused which resolved with abx treatment.  For her Diabetes; she was taken off of glipizide due to lower BS.  Also, her Lantus was able to adjust from 45u to 34u.  She did have some weight loss due to not eating much but that improved with oral supplements and her weight has stabilized.      Discharge Medications:    Current Outpatient Medications   Medication Sig Dispense Refill   ? acetaminophen (TYLENOL) 325 MG tablet Take 650 mg by mouth every 6 (six) hours as needed for pain.     ? alendronate (FOSAMAX) 10 MG tablet Take 70 mg by mouth once a week. Take in the morning on an empty stomach with a full glass of water 30 minutes before food     ? atorvastatin (LIPITOR) 40 MG tablet Take 40 mg by mouth at bedtime.     ? calcium, as  carbonate, (TUMS) 200 mg calcium (500 mg) chewable tablet Chew 2 tablets daily.     ? cholecalciferol, vitamin D3, 1,000 unit tablet Take 2,000 Units by mouth daily.            ? insulin glargine (LANTUS) 100 unit/mL injection Inject 34 Units under the skin at bedtime.     ? metFORMIN (GLUCOPHAGE) 1000 MG tablet Take 1,000 mg by mouth 2 (two) times a day with meals.     ? polyethylene glycol (MIRALAX) 17 gram packet Take 17 g by mouth daily.     ? senna-docusate (PERICOLACE) 8.6-50 mg tablet Take 1 tablet by mouth daily.     ? sitaGLIPtin (JANUVIA) 100 MG tablet Take 100 mg by mouth daily.     ? LANTUS SOLOSTAR U-100 INSULIN 100 unit/mL (3 mL) pen Inject 45 Units under the skin at bedtime. 11.65 Type 2 with hyperglycemia  Contact provider if insulin prescribed is not the preferred insulin per insurance. (Patient taking differently: Inject 34 Units under the skin at bedtime. 11.65 Type 2 with hyperglycemia  Contact provider if insulin prescribed is not the preferred insulin per insurance.      ) 15 mL 0     No current facility-administered medications for this visit.        For most current and accurate medication list, please contact the skilled nursing facility that this patient visit took place at.      Discharge Plan:  Patient is stable to discharge to home with family support and home care services.  She will need to follow up with her PMD in the next 7-10 days for ongoing osteoporosis workup, DM management as her BS may increase when she returns to home diet.     Review of Systems   Patient denies fever, chills, headache, lightheadedness, dizziness, rhinorrhea, cough, congestion, shortness of breath, chest pain, palpitations, abdominal pain, n/v, diarrhea, constipation, change in appetite, dysuria, frequency, burning or pain with urination.  Other than stated in HPI all other review of systems is negative.         Physical Exam   Vitals: /70, HR 78, resp 18, temp 97.3  GENERAL APPEARANCE: Well developed,  obese woman in no acute distress  HEENT: normocephalic, atraumatic  PERRL, sclerae anicteric, conjunctivae clear and moist, EOM intact  LUNGS: lungs CTA, respiratory effort normal.  CARD: RRR, S1, S2, without murmurs, gallops, rubs,   Abdomen: soft, non-distended, non-tender, +BS x 4   MSK: foot drop right foot  EXTREMITIES: No cyanosis, clubbing or edema.   No s/s of DVT  NEURO: Alert and oriented, face is symmetric.  Good lower extremity sensation.   SKIN: no new lesions, rashes or petechia   PSYCH: euthymic.  Labs: No results found for this or any previous visit (from the past 240 hour(s)).      Assessment:  1. Closed compression fracture of third lumbar vertebra, sequela     2. S/P laminectomy     3. Type 2 diabetes mellitus with hyperglycemia, with long-term current use of insulin (H)     4. Age-related osteoporosis with current pathological fracture with routine healing, subsequent encounter     5. Vitamin D deficiency     6. Bilateral foot-drop     7. Falls frequently         MEDICAL EQUIPMENT NEEDS:  Walker and wheelchair      DISCHARGE PLAN/FACE TO FACE:  I certify that services are/were furnished while this patient was under the care of a physician and that a physician or an allowed non-physician practitioner (NPP), had a face-to-face encounter that meets the physician face-to-face encounter requirements. The encounter was in whole, or in part, related to the primary reason for home health. The patient is confined to his/her home and needs intermittent skilled nursing, physical therapy, speech-language pathology, or the continued need for occupational therapy. A plan of care has been established by a physician and is periodically reviewed by a physician.    I certify that this patient is under my care and that I, or a nurse practitioner or physician's assistant working with me, had a face-to-face encounter that meets the physician face-to-face encounter requirements with this patient.   Date of  Face-to-Face Encounter: 6/3/2019    I certify that, based on my findings, the following services are medically necessary home health services: RN, PT, OT    My clinical findings support the need for the above skilled services because: (Please write a brief narrative summary that describes what the RN, PT, SLP, or other services will be doing in the home. A list of diagnoses in this section does not meet the CMS requirements.) RN for medication management and diabetic teaching. PT/OT for ongoing strengthening, endurance and home safety eval.     This patient is homebound because: (Please write a brief narrative summary describing the functional limitations as to why this patient is homebound and specifically what makes this patient homebound.) Patient needs maximum assist in order to get out into the community for medical appointments.     The patient is, or has been, under my care and I have initiated the establishment of the plan of care. This patient will be followed by a physician who will periodically review the plan of care.    30 minutes total time spent with 25 minutes spent with patient and therapy in coordination of discharge plan.     Electronically signed by: Teri Brush CNP

## 2021-06-19 NOTE — LETTER
Letter by Teri Brush CNP at      Author: Teri Brush CNP Service: -- Author Type: --    Filed:  Encounter Date: 4/5/2019 Status: (Other)         Patient: Trinidad Brown   MR Number: [id number]   YOB: 1945   Date of Visit: 4/5/2019     Code Status:  DNR  Visit Type: Follow Up     Facility:  [facility] [[id number]]      Facility Type: SNF (Skilled Nursing Facility, TCU)    History of Present Illness:   Hospital Admission Date: 3/24/2019  Hospital Discharge Date: 4/2/2019  Facility Admission Date: 4/2/2019    Trinidad Brown is a 73 y.o. female seen today for follow-up TCU visit.  She had a hospitalization at [facility] 3/20 424/2/2019.  She has a past medical history for 4 type 2 diabetes.  She had a fall at home with persistent pain and was seen in the ER.  She had a CT of her spine which showed significant spinal stenosis of her L3 to L5-S1.  An MRI showed lumbar spondylosis with mild acute active compression fracture and moderate edema.  She did have a disc herniation and ligamentous changes on MRI.  Neurosurgery was consulted and she was taken to surgery on 3/29/19 for an L-spine decompressive laminectomy.  Her incision site was stapled closed and postop course was complicated by acute blood loss anemia with a discharge hemoglobin of 8.7 down from baseline of 14.9.  He was also treated for an E. coli UTI with 5 days of amoxicillin.  She was discharged to the TCU with orders to wear a TLSO when out of bed.  She also had significant left foot drop which per patient was chronic and an AFO was ordered.  She did show cognitive impairment however due to cultural issues they were unable to do good cognitive testing while in the hospital.  Her daughter had reported to hospital staff that her memory issues were declining the past couple months.  She is to follow-up with neuro surgery in 1 week for an incision check and have her staples removed in 3 weeks.  It was also recommended that  she have osteoporosis workup as an outpatient.    Today, I meet with her with an , she complains of lower back pain with twisting type movement.  She denies any pain into her lower extremities and states the pain medication does help improve her pain.  I did speak with neurosurgery earlier this week in regards to her TLSO and they recommended she have it adjusted by [facility] who came out and trimmed down some of the bulk in length.  Therapy reports that they are able to get her to participate more in therapy now with this better fitted TLSO.  She has good CMS to lower extremities bilaterally and shows no signs and symptoms of DVT.  Is able to wiggle her toes easily.  She states she has a good appetite.  Her incision is well approximated with staples and free of edema, erythema, and or drainage.  Her hemoglobin was checked today at 8.8 which is stable.  She had a UA UC done earlier this week due to family reporting increased confusion.  The UA showed she has a urinary tract infection awaiting UC results in order to determine treatment plan.  She denies any symptoms of dysuria, burning or frequency.    Past Medical History:   Diagnosis Date   ? Squamous cell carcinoma of back    ? Type 2 diabetes mellitus with hyperglycemia (H) 12/9/2015     Past Surgical History:   Procedure Laterality Date   ? BACK SURGERY      tumor removal   ? LUMBAR LAMINECTOMY Bilateral 3/29/2019    Procedure: Lumbar 3- Lumbar-4, Lumbar 4-Lumbar -5, Lumbar 5-Sacral 1,  decompressive laminectomy.;  Surgeon: Naeem Thomas MD;  Location: [facility] OR;  Service: Spine     Family History   Problem Relation Age of Onset   ? Stroke Daughter    ? Hypertension Daughter    ? No Medical Problems Son      Social History     Socioeconomic History   ? Marital status: Single     Spouse name: Not on file   ? Number of children: Not on file   ? Years of education: Not on file   ? Highest education level: Not on file    Occupational History   ? Not on file   Social Needs   ? Financial resource strain: Not on file   ? Food insecurity:     Worry: Not on file     Inability: Not on file   ? Transportation needs:     Medical: Not on file     Non-medical: Not on file   Tobacco Use   ? Smoking status: Never Smoker   ? Smokeless tobacco: Never Used   Substance and Sexual Activity   ? Alcohol use: No   ? Drug use: No   ? Sexual activity: Yes     Birth control/protection: Post-menopausal   Lifestyle   ? Physical activity:     Days per week: Not on file     Minutes per session: Not on file   ? Stress: Not on file   Relationships   ? Social connections:     Talks on phone: Not on file     Gets together: Not on file     Attends [employer] service: Not on file     Active member of club or organization: Not on file     Attends meetings of clubs or organizations: Not on file     Relationship status: Not on file   ? Intimate partner violence:     Fear of current or ex partner: Not on file     Emotionally abused: Not on file     Physically abused: Not on file     Forced sexual activity: Not on file   Other Topics Concern   ? Not on file   Social History Narrative   ? Not on file       Current Outpatient Medications   Medication Sig Dispense Refill   ? atorvastatin (LIPITOR) 40 MG tablet Take 40 mg by mouth at bedtime.     ? calcium carbonate-vitamin D3 (CALCIUM WITH VITAMIN D) 600 mg(1,500mg) -400 unit per tablet Take 1 tablet by mouth 2 (two) times a day.     ? glipiZIDE (GLUCOTROL) 5 MG tablet Take 5 mg by mouth 2 (two) times a day before meals.     ? HYDROmorphone (DILAUDID) 2 MG tablet Take 2 mg by mouth every 3 (three) hours as needed for pain. 0.5 to 1 tab every 3 hours prn     ? LANTUS SOLOSTAR U-100 INSULIN 100 unit/mL (3 mL) pen Inject 45 Units under the skin at bedtime. 11.65 Type 2 with hyperglycemia  Contact provider if insulin prescribed is not the preferred insulin per insurance. 15 mL 0   ? metFORMIN (GLUCOPHAGE) 1000 MG tablet Take  1,000 mg by mouth 2 (two) times a day with meals.     ? polyethylene glycol (MIRALAX) 17 gram packet Take 1 packet (17 g total) by mouth 2 (two) times a day. 60 packet 0   ? senna-docusate (PERICOLACE) 8.6-50 mg tablet Take 1 tablet by mouth 2 (two) times a day. 60 tablet 0   ? sitaGLIPtin (JANUVIA) 100 MG tablet Take 100 mg by mouth daily.       No current facility-administered medications for this visit.      No Known Allergies  Immunization History   Administered Date(s) Administered   ? Influenza high dose, seasonal 03/26/2019   ? Influenza, inj, historic,unspecified 09/16/2015         Review of Systems   Patient denies fever, chills, headache, lightheadedness, dizziness, rhinorrhea, cough, congestion, shortness of breath, chest pain, palpitations, abdominal pain, n/v, diarrhea, constipation, change in appetite, dysuria, frequency, burning or pain with urination.  Other than stated in HPI all other review of systems is negative.         Physical Exam   Vital signs: /58, heart rate 81, respiratory 12, temp 96.4, blood sugars  .  GENERAL APPEARANCE: Well developed, obese woman moaning in her bed.   HEENT: normocephalic, atraumatic  PERRL, sclerae anicteric, conjunctivae clear and moist, EOM intact  LUNGS: Lung sounds CTA, no adventitious sounds, respiratory effort normal.  CARD: RRR, S1, S2, without murmurs, gallops, rubs, no JVD,   ABD: Soft and nontender with normal bowel sounds.   MSK: Lower extremities show strength of 4/5, upper extremities show normal strength.  EXTREMITIES: No cyanosis, clubbing or edema.  Good CMS with positive pulses to lower extremities.  NEURO: Alert and tenable cognitive impairment. Face is symmetric.  Good lower extremity sensation.  Foot drop on right, able to move toes.  SKIN: Midline back lumbar surgical incision well approximated with staples, no edema, erythema, drainage.  PSYCH: euthymic            Labs:    Recent Results (from the past 240 hour(s))   POCT Glucose    Result Value Ref Range    Glucose 193 (H) 70 - 139 mg/dL   POCT Glucose - 4 times daily before meals and at bedtime   Result Value Ref Range    Glucose 239 (H) 70 - 139 mg/dL   POCT Glucose - 4 times daily before meals and at bedtime   Result Value Ref Range    Glucose 154 (H) 70 - 139 mg/dL   POCT Glucose   Result Value Ref Range    Glucose 295 (H) 70 - 139 mg/dL   POCT Glucose   Result Value Ref Range    Glucose 198 (H) 70 - 139 mg/dL   HM2(CBC w/o Differential)   Result Value Ref Range    WBC 5.6 4.0 - 11.0 thou/uL    RBC 3.92 3.80 - 5.40 mill/uL    Hemoglobin 12.5 12.0 - 16.0 g/dL    Hematocrit 37.5 35.0 - 47.0 %    MCV 96 80 - 100 fL    MCH 31.9 27.0 - 34.0 pg    MCHC 33.3 32.0 - 36.0 g/dL    RDW 11.9 11.0 - 14.5 %    Platelets 184 140 - 440 thou/uL    MPV 9.2 8.5 - 12.5 fL   POCT Glucose   Result Value Ref Range    Glucose 193 (H) 70 - 139 mg/dL   POCT Glucose   Result Value Ref Range    Glucose 206 (H) 70 - 139 mg/dL   POCT Glucose - 4 times daily before meals and at bedtime   Result Value Ref Range    Glucose 264 (H) 70 - 139 mg/dL   POCT Glucose - 4 times daily before meals and at bedtime   Result Value Ref Range    Glucose 97 70 - 139 mg/dL   POCT Glucose - 4 times daily before meals and at bedtime   Result Value Ref Range    Glucose 203 (H) 70 - 139 mg/dL   POCT Glucose   Result Value Ref Range    Glucose 202 (H) 70 - 139 mg/dL   Basic Metabolic Panel   Result Value Ref Range    Sodium 138 136 - 145 mmol/L    Potassium 4.0 3.5 - 5.0 mmol/L    Chloride 107 98 - 107 mmol/L    CO2 23 22 - 31 mmol/L    Anion Gap, Calculation 8 5 - 18 mmol/L    Glucose 163 (H) 70 - 125 mg/dL    Calcium 8.6 8.5 - 10.5 mg/dL    BUN 8 8 - 28 mg/dL    Creatinine 0.66 0.60 - 1.10 mg/dL    GFR MDRD Af Amer >60 >60 mL/min/1.73m2    GFR MDRD Non Af Amer >60 >60 mL/min/1.73m2   HM2(CBC w/o Differential)   Result Value Ref Range    WBC 4.7 4.0 - 11.0 thou/uL    RBC 4.00 3.80 - 5.40 mill/uL    Hemoglobin 12.7 12.0 - 16.0 g/dL     Hematocrit 37.0 35.0 - 47.0 %    MCV 93 80 - 100 fL    MCH 31.8 27.0 - 34.0 pg    MCHC 34.3 32.0 - 36.0 g/dL    RDW 11.9 11.0 - 14.5 %    Platelets 193 140 - 440 thou/uL    MPV 8.7 8.5 - 12.5 fL   POCT Glucose   Result Value Ref Range    Glucose 156 (H) 70 - 139 mg/dL   POCT Glucose   Result Value Ref Range    Glucose 157 (H) 70 - 139 mg/dL   POCT Glucose - 4 times daily before meals and at bedtime   Result Value Ref Range    Glucose 241 (H) 70 - 139 mg/dL   POCT Glucose - 4 times daily before meals and at bedtime   Result Value Ref Range    Glucose 231 (H) 70 - 139 mg/dL   POCT Glucose - 4 times daily before meals and at bedtime   Result Value Ref Range    Glucose 65 (L) 70 - 139 mg/dL   POCT Glucose   Result Value Ref Range    Glucose 149 (H) 70 - 139 mg/dL   POCT Glucose   Result Value Ref Range    Glucose 77 70 - 139 mg/dL   POCT Glucose   Result Value Ref Range    Glucose 113 70 - 139 mg/dL   POCT Glucose   Result Value Ref Range    Glucose 162 (H) 70 - 139 mg/dL   HM2(CBC w/o Differential)   Result Value Ref Range    WBC 6.3 4.0 - 11.0 thou/uL    RBC 3.67 (L) 3.80 - 5.40 mill/uL    Hemoglobin 11.6 (L) 12.0 - 16.0 g/dL    Hematocrit 34.8 (L) 35.0 - 47.0 %    MCV 95 80 - 100 fL    MCH 31.6 27.0 - 34.0 pg    MCHC 33.3 32.0 - 36.0 g/dL    RDW 12.1 11.0 - 14.5 %    Platelets 220 140 - 440 thou/uL    MPV 8.9 8.5 - 12.5 fL   Glucose   Result Value Ref Range    Glucose 89 70 - 125 mg/dL    Patient Fasting > 8hrs? Unknown    POCT Glucose - 4 times daily before meals and at bedtime   Result Value Ref Range    Glucose 112 70 - 139 mg/dL   POCT Glucose - 4 times daily before meals and at bedtime   Result Value Ref Range    Glucose 178 (H) 70 - 139 mg/dL   POCT Glucose - 4 times daily before meals and at bedtime   Result Value Ref Range    Glucose 243 (H) 70 - 139 mg/dL   Hemoglobin   Result Value Ref Range    Hemoglobin 9.9 (L) 12.0 - 16.0 g/dL   Platelet Count - every other day x 3   Result Value Ref Range    Platelets  191 140 - 440 thou/uL   POCT Glucose - 4 times daily before meals and at bedtime   Result Value Ref Range    Glucose 424 (H) 70 - 139 mg/dL   Glucose, Random   Result Value Ref Range    Glucose 416 (HH) 70 - 125 mg/dL    Patient Fasting > 8hrs? Unknown    POCT Glucose - 4 times daily before meals and at bedtime   Result Value Ref Range    Glucose 385 (H) 70 - 139 mg/dL   POCT Glucose - 4 times daily before meals and at bedtime   Result Value Ref Range    Glucose 435 (H) 70 - 139 mg/dL   POCT Glucose - 4 times daily before meals and at bedtime   Result Value Ref Range    Glucose 283 (H) 70 - 139 mg/dL   HM2(CBC w/o Differential)   Result Value Ref Range    WBC 13.1 (H) 4.0 - 11.0 thou/uL    RBC 2.75 (L) 3.80 - 5.40 mill/uL    Hemoglobin 8.7 (L) 12.0 - 16.0 g/dL    Hematocrit 26.1 (L) 35.0 - 47.0 %    MCV 95 80 - 100 fL    MCH 31.6 27.0 - 34.0 pg    MCHC 33.3 32.0 - 36.0 g/dL    RDW 12.4 11.0 - 14.5 %    Platelets 192 140 - 440 thou/uL    MPV 8.7 8.5 - 12.5 fL   Basic Metabolic Panel   Result Value Ref Range    Sodium 140 136 - 145 mmol/L    Potassium 3.7 3.5 - 5.0 mmol/L    Chloride 109 (H) 98 - 107 mmol/L    CO2 27 22 - 31 mmol/L    Anion Gap, Calculation 4 (L) 5 - 18 mmol/L    Glucose 126 (H) 70 - 125 mg/dL    Calcium 8.4 (L) 8.5 - 10.5 mg/dL    BUN 11 8 - 28 mg/dL    Creatinine 0.61 0.60 - 1.10 mg/dL    GFR MDRD Af Amer >60 >60 mL/min/1.73m2    GFR MDRD Non Af Amer >60 >60 mL/min/1.73m2   POCT Glucose - 4 times daily before meals and at bedtime   Result Value Ref Range    Glucose 148 (H) 70 - 139 mg/dL   POCT Glucose - 4 times daily before meals and at bedtime   Result Value Ref Range    Glucose 148 (H) 70 - 139 mg/dL   POCT Glucose - 4 times daily before meals and at bedtime   Result Value Ref Range    Glucose 163 (H) 70 - 139 mg/dL   POCT Glucose - 4 times daily before meals and at bedtime   Result Value Ref Range    Glucose 160 (H) 70 - 139 mg/dL   HM2(CBC w/o Differential)   Result Value Ref Range    WBC 11.3  (H) 4.0 - 11.0 thou/uL    RBC 2.74 (L) 3.80 - 5.40 mill/uL    Hemoglobin 8.8 (L) 12.0 - 16.0 g/dL    Hematocrit 25.7 (L) 35.0 - 47.0 %    MCV 94 80 - 100 fL    MCH 32.1 27.0 - 34.0 pg    MCHC 34.2 32.0 - 36.0 g/dL    RDW 12.9 11.0 - 14.5 %    Platelets 196 140 - 440 thou/uL    MPV 9.3 8.5 - 12.5 fL   POCT Glucose - 4 times daily before meals and at bedtime   Result Value Ref Range    Glucose 245 (H) 70 - 139 mg/dL   POCT Glucose - 4 times daily before meals and at bedtime   Result Value Ref Range    Glucose 149 (H) 70 - 139 mg/dL   POCT Glucose - 4 times daily before meals and at bedtime   Result Value Ref Range    Glucose 118 70 - 139 mg/dL   POCT Glucose - 4 times daily before meals and at bedtime   Result Value Ref Range    Glucose 153 (H) 70 - 139 mg/dL   POCT Glucose - 4 times daily before meals and at bedtime   Result Value Ref Range    Glucose 91 70 - 139 mg/dL   POCT Glucose - 4 times daily before meals and at bedtime   Result Value Ref Range    Glucose 110 70 - 139 mg/dL   Urinalysis-UC if Indicated   Result Value Ref Range    Color, UA Yellow Colorless, Yellow, Straw, Light Yellow    Clarity, UA Cloudy (!) Clear    Glucose, UA Negative Negative    Bilirubin, UA Negative Negative    Ketones, UA Negative Negative    Specific Gravity, UA 1.017 1.001 - 1.030    Blood, UA Trace (!) Negative    pH, UA 6.5 4.5 - 8.0    Protein, UA Trace (!) Negative mg/dL    Urobilinogen, UA <2.0 E.U./dL <2.0 E.U./dL, 2.0 E.U./dL    Nitrite, UA Positive (!) Negative    Leukocytes, UA Large (!) Negative    Bacteria, UA Many (!) None Seen hpf    RBC, UA 0-2 None Seen, 0-2 hpf    WBC, UA >100 (!) None Seen, 0-5 hpf    Squam Epithel, UA 10-25 (!) None Seen, 0-5 lpf    Mucus, UA Moderate (!) None Seen lpf   Hemoglobin   Result Value Ref Range    Hemoglobin 8.8 (L) 12.0 - 16.0 g/dL         Assessment:  1. S/P laminectomy     2. Closed compression fracture of third lumbar vertebra, sequela     3. Pain management     4. Type 2 diabetes  mellitus with hyperglycemia, with long-term current use of insulin (H)     5. Acute cystitis without hematuria     6. Anemia, unspecified type         Plan:   Status post laminectomy and compression fracture: She reports the TLSO still is snug when she sits up.  Therapy reports it is fitting better.  I counseled her on the rationale of the TLSO to keep her spine in a neutral position in order for the fracture to heal.    Pain management: Continue with Dilaudid 1-2 mg every 3 hours as needed for pain.  Continue with scheduled Tylenol.    Diabetes: Stable continue with metformin 1000 mg twice daily and Lantus 45 units at at bedtime.    UTI: Awaiting results from urine culture in order to treat with appropriate antibiotics.  Asymptomatic at this time so will not start on Pyridium.    Anemia: Holding stable at this time.  We will recheck a CBC next week with iron studies in order to determine if there are next steps that are appropriate for treatment.              Electronically signed by: Teri Brush CNP

## 2021-06-19 NOTE — LETTER
Letter by Teri Brush CNP at      Author: Teri Brush CNP Service: -- Author Type: --    Filed:  Encounter Date: 4/12/2019 Status: (Other)         Patient: Trinidad Brown   MR Number: [id number]   YOB: 1945   Date of Visit: 4/12/2019     Code Status:  DNR  Visit Type: Follow Up     Facility:  [facility] [[id number]]      Facility Type: SNF (Skilled Nursing Facility, TCU)    History of Present Illness:    Trinidad Brown is a 74 y.o. female seen today for follow-up TCU visit.  She had a hospitalization at [facility] 3/20 424/2/2019.  She has a past medical history for 4 type 2 diabetes.  She had a fall at home with persistent pain and was seen in the ER.  She had a CT of her spine which showed significant spinal stenosis of her L3 to L5-S1.  An MRI showed lumbar spondylosis with mild acute active compression fracture and moderate edema.  She did have a disc herniation and ligamentous changes on MRI.  Neurosurgery was consulted and she was taken to surgery on 3/29/19 for an L-spine decompressive laminectomy.  Her incision site was stapled closed and postop course was complicated by acute blood loss anemia with a discharge hemoglobin of 8.7 down from baseline of 14.9.  He was also treated for an E. coli UTI with 5 days of amoxicillin.  She was discharged to the TCU with orders to wear a TLSO when out of bed.  She also had significant left foot drop which per patient was chronic and an AFO was ordered.  She did show cognitive impairment however due to cultural issues they were unable to do good cognitive testing while in the hospital.  Her daughter had reported to hospital staff that her memory issues were declining the past couple months.  It was also recommended that she have osteoporosis workup as an outpatient.    Today, I met with her grand-daughter and talked about the patient progress.  I explained that her progress is delayed due to her leg weakness and foot drops.  I explained  that she is very motivated to participate with therapy however her recovery is slow due to the weakness she had before the surgery.  I discussed with her her follow up and her pain.  I also let her know that again [facility] will be coming out to make more adjustments on her tLSO.  Ms. Brown denies any issues with bowels or bladder.  She completed her Cipro for UTI yesterday. Granddaughter states her cognitive status seems to have improved this week. BS stable after decreasing lantus and dcing glipizide this week.     Past Medical History:   Diagnosis Date   ? Squamous cell carcinoma of back    ? Type 2 diabetes mellitus with hyperglycemia (H) 12/9/2015     Past Surgical History:   Procedure Laterality Date   ? BACK SURGERY      tumor removal   ? LUMBAR LAMINECTOMY Bilateral 3/29/2019    Procedure: Lumbar 3- Lumbar-4, Lumbar 4-Lumbar -5, Lumbar 5-Sacral 1,  decompressive laminectomy.;  Surgeon: Naeem Thomas MD;  Location: [facility];  Service: Spine     Family History   Problem Relation Age of Onset   ? Stroke Daughter    ? Hypertension Daughter    ? No Medical Problems Son      Social History     Socioeconomic History   ? Marital status: Single     Spouse name: Not on file   ? Number of children: Not on file   ? Years of education: Not on file   ? Highest education level: Not on file   Occupational History   ? Not on file   Social Needs   ? Financial resource strain: Not on file   ? Food insecurity:     Worry: Not on file     Inability: Not on file   ? Transportation needs:     Medical: Not on file     Non-medical: Not on file   Tobacco Use   ? Smoking status: Never Smoker   ? Smokeless tobacco: Never Used   Substance and Sexual Activity   ? Alcohol use: No   ? Drug use: No   ? Sexual activity: Yes     Birth control/protection: Post-menopausal   Lifestyle   ? Physical activity:     Days per week: Not on file     Minutes per session: Not on file   ? Stress: Not on file   Relationships   ?  Social connections:     Talks on phone: Not on file     Gets together: Not on file     Attends Jain service: Not on file     Active member of club or organization: Not on file     Attends meetings of clubs or organizations: Not on file     Relationship status: Not on file   ? Intimate partner violence:     Fear of current or ex partner: Not on file     Emotionally abused: Not on file     Physically abused: Not on file     Forced sexual activity: Not on file   Other Topics Concern   ? Not on file   Social History Narrative   ? Not on file       Current Outpatient Medications   Medication Sig Dispense Refill   ? atorvastatin (LIPITOR) 40 MG tablet Take 40 mg by mouth at bedtime.     ? calcium carbonate-vitamin D3 (CALCIUM WITH VITAMIN D) 600 mg(1,500mg) -400 unit per tablet Take 1 tablet by mouth 2 (two) times a day.     ? HYDROmorphone (DILAUDID) 2 MG tablet Take 2 mg by mouth every 3 (three) hours as needed for pain. 0.5 to 1 tab every 3 hours prn     ? LANTUS SOLOSTAR U-100 INSULIN 100 unit/mL (3 mL) pen Inject 45 Units under the skin at bedtime. 11.65 Type 2 with hyperglycemia  Contact provider if insulin prescribed is not the preferred insulin per insurance. 15 mL 0   ? metFORMIN (GLUCOPHAGE) 1000 MG tablet Take 1,000 mg by mouth 2 (two) times a day with meals.     ? polyethylene glycol (MIRALAX) 17 gram packet Take 1 packet (17 g total) by mouth 2 (two) times a day. 60 packet 0   ? senna-docusate (PERICOLACE) 8.6-50 mg tablet Take 1 tablet by mouth 2 (two) times a day. 60 tablet 0   ? sitaGLIPtin (JANUVIA) 100 MG tablet Take 100 mg by mouth daily.       No current facility-administered medications for this visit.      No Known Allergies  Immunization History   Administered Date(s) Administered   ? Influenza high dose, seasonal 03/26/2019   ? Influenza, inj, historic,unspecified 09/16/2015         Review of Systems   Patient denies fever, chills, headache, lightheadedness, dizziness, rhinorrhea, cough,  congestion, shortness of breath, chest pain, palpitations, abdominal pain, n/v, diarrhea, constipation, change in appetite, dysuria, frequency, burning or pain with urination.  Other than stated in HPI all other review of systems is negative.         Physical Exam   Vital signs: /68, HR 80, resp 16, temp 96.8   GENERAL APPEARANCE: Well developed, obese woman moaning in her bed.   HEENT: normocephalic, atraumatic  PERRL, sclerae anicteric, conjunctivae clear and moist, EOM intact  LUNGS: Lung sounds CTA, no adventitious sounds, respiratory effort normal.  CARD: RRR, S1, S2, without murmurs, gallops, rubs, no JVD,   ABD: Soft and nontender with normal bowel sounds.   EXTREMITIES: No cyanosis, clubbing or edema.  Good CMS with positive pulses to lower extremities.  NEURO: Alert and oriented, face is symmetric.  Good lower extremity sensation.  Foot drop on right, able to move toes.  SKIN: no new skin issues.  PSYCH: euthymic            Labs:    Recent Results (from the past 240 hour(s))   Urinalysis-UC if Indicated   Result Value Ref Range    Color, UA Yellow Colorless, Yellow, Straw, Light Yellow    Clarity, UA Cloudy (!) Clear    Glucose, UA Negative Negative    Bilirubin, UA Negative Negative    Ketones, UA Negative Negative    Specific Gravity, UA 1.017 1.001 - 1.030    Blood, UA Trace (!) Negative    pH, UA 6.5 4.5 - 8.0    Protein, UA Trace (!) Negative mg/dL    Urobilinogen, UA <2.0 E.U./dL <2.0 E.U./dL, 2.0 E.U./dL    Nitrite, UA Positive (!) Negative    Leukocytes, UA Large (!) Negative    Bacteria, UA Many (!) None Seen hpf    RBC, UA 0-2 None Seen, 0-2 hpf    WBC, UA >100 (!) None Seen, 0-5 hpf    Squam Epithel, UA 10-25 (!) None Seen, 0-5 lpf    Mucus, UA Moderate (!) None Seen lpf   Culture, Urine   Result Value Ref Range    Culture Mixture of urogenital organisms with     Culture >100,000 col/ml Escherichia coli (!)        Susceptibility    Escherichia coli - CR     Amoxicillin + Clavulanate <=4/2  Sensitive      Ampicillin <=4 Sensitive      Cefazolin 2 Sensitive      Cefepime <=1 Sensitive      Ceftriaxone <=1 Sensitive      Ciprofloxacin <=0.5 Sensitive      Gentamicin <=2 Sensitive      Levofloxacin <=1 Sensitive      Meropenem <=0.25 Sensitive      Nitrofurantoin 32 Sensitive      Tetracycline <=2 Sensitive      Tobramycin <=2 Sensitive      Trimethoprim + Sulfamethoxazole <=0.5 Sensitive    Hemoglobin   Result Value Ref Range    Hemoglobin 8.8 (L) 12.0 - 16.0 g/dL   Ferritin   Result Value Ref Range    Ferritin 267 (H) 10 - 130 ng/mL   Iron   Result Value Ref Range    Iron 47 42 - 175 ug/dL   Thyroid Stimulating Hormone (TSH)   Result Value Ref Range    TSH 0.26 (L) 0.30 - 5.00 uIU/mL   HM1 (CBC with Diff)   Result Value Ref Range    WBC 7.3 4.0 - 11.0 thou/uL    RBC 3.38 (L) 3.80 - 5.40 mill/uL    Hemoglobin 10.7 (L) 12.0 - 16.0 g/dL    Hematocrit 33.3 (L) 35.0 - 47.0 %    MCV 99 80 - 100 fL    MCH 31.7 27.0 - 34.0 pg    MCHC 32.1 32.0 - 36.0 g/dL    RDW 14.1 11.0 - 14.5 %    Platelets 333 140 - 440 thou/uL    MPV 8.7 8.5 - 12.5 fL    Neutrophils % 66 50 - 70 %    Lymphocytes % 26 20 - 40 %    Monocytes % 7 2 - 10 %    Eosinophils % 1 0 - 6 %    Basophils % 0 0 - 2 %    Neutrophils Absolute 4.8 2.0 - 7.7 thou/uL    Lymphocytes Absolute 1.9 0.8 - 4.4 thou/uL    Monocytes Absolute 0.5 0.0 - 0.9 thou/uL    Eosinophils Absolute 0.1 0.0 - 0.4 thou/uL    Basophils Absolute 0.0 0.0 - 0.2 thou/uL         Assessment:  1. S/P laminectomy     2. Closed compression fracture of third lumbar vertebra, sequela     3. Pain management     4. Type 2 diabetes mellitus with hyperglycemia, with long-term current use of insulin (H)     5. Bilateral foot-drop         Plan:   Laminectomy and compression fracture: continue TLSO when up.  Continue therapy    Pain management continue dilaudid 1mg every 3 hours prn, continue scheduled tylenol.     DM: continue with Lantus 42u, Metformin 1000mg two times a day and  sitagliptan    Bilateral foot drop: nursing to assure they use pillows to place feet in dorsa-flexion while in bed.               Electronically signed by: Teri Brush CNP

## 2021-06-19 NOTE — LETTER
"Letter by Teri Brush CNP at      Author: Teri Brush CNP Service: -- Author Type: --    Filed:  Encounter Date: 4/16/2019 Status: (Other)         Patient: Trinidad Brown   MR Number: [id number]   YOB: 1945   Date of Visit: 4/16/2019     Code Status:  DNR  Visit Type: Follow Up     Facility:  [facility] [[id number]]      Facility Type: SNF (Skilled Nursing Facility, TCU)    History of Present Illness:    Trinidad Brown is a 74 y.o. female seen today for follow-up TCU visit.  She had a hospitalization at [facility] 3/20 424/2/2019.  She has a past medical history for 4 type 2 diabetes.  She had a fall at home with persistent pain and was seen in the ER.  She had a CT of her spine which showed significant spinal stenosis of her L3 to L5-S1.  An MRI showed lumbar spondylosis with mild acute active compression fracture and moderate edema.  She did have a disc herniation and ligamentous changes on MRI.  Neurosurgery was consulted and she was taken to surgery on 3/29/19 for an L-spine decompressive laminectomy.  Her incision site was stapled closed and postop course was complicated by acute blood loss anemia with a discharge hemoglobin of 8.7 down from baseline of 14.9.  He was also treated for an E. coli UTI with 5 days of amoxicillin.  She was discharged to the TCU with orders to wear a TLSO when out of bed.  She also had significant left foot drop which per patient was chronic and an AFO was ordered.  She did show cognitive impairment however due to cultural issues they were unable to do good cognitive testing while in the hospital.  Her daughter had reported to hospital staff that her memory issues were declining the past couple months.  It was also recommended that she have osteoporosis workup as an outpatient.    Today, I met with patient with a video .  She is resistant to conversation today stating \"why am I being held here I did not do anything wrong\".  I was not " able to rationalize with her the fact that she continued to need therapy due to her weakness and inability to stand or transfer on her own.  I did request nursing to contact family and see if they could visit today to improve her mood.  Her confusion that was apparent upon admission has now cleared and she has no complaints of urinary issues.  However she is not easily rationalized with which seems more likely due to a language barrier.  She denies any pain it is taking Dilaudid 2 mg 1-2 times a day.  Her blood sugars have been stable with a decrease in Lantus to 42 units and a discontinuation of glipizide.  He was on Sitagliptin and metformin along with the Lantus.    Past Medical History:   Diagnosis Date   ? Squamous cell carcinoma of back    ? Type 2 diabetes mellitus with hyperglycemia (H) 12/9/2015     Past Surgical History:   Procedure Laterality Date   ? BACK SURGERY      tumor removal   ? LUMBAR LAMINECTOMY Bilateral 3/29/2019    Procedure: Lumbar 3- Lumbar-4, Lumbar 4-Lumbar -5, Lumbar 5-Sacral 1,  decompressive laminectomy.;  Surgeon: Naeem Thomas MD;  Location: [facility] OR;  Service: Spine     Family History   Problem Relation Age of Onset   ? Stroke Daughter    ? Hypertension Daughter    ? No Medical Problems Son      Social History     Socioeconomic History   ? Marital status: Single     Spouse name: Not on file   ? Number of children: Not on file   ? Years of education: Not on file   ? Highest education level: Not on file   Occupational History   ? Not on file   Social Needs   ? Financial resource strain: Not on file   ? Food insecurity:     Worry: Not on file     Inability: Not on file   ? Transportation needs:     Medical: Not on file     Non-medical: Not on file   Tobacco Use   ? Smoking status: Never Smoker   ? Smokeless tobacco: Never Used   Substance and Sexual Activity   ? Alcohol use: No   ? Drug use: No   ? Sexual activity: Yes     Birth control/protection: Post-menopausal    Lifestyle   ? Physical activity:     Days per week: Not on file     Minutes per session: Not on file   ? Stress: Not on file   Relationships   ? Social connections:     Talks on phone: Not on file     Gets together: Not on file     Attends Orthodoxy service: Not on file     Active member of club or organization: Not on file     Attends meetings of clubs or organizations: Not on file     Relationship status: Not on file   ? Intimate partner violence:     Fear of current or ex partner: Not on file     Emotionally abused: Not on file     Physically abused: Not on file     Forced sexual activity: Not on file   Other Topics Concern   ? Not on file   Social History Narrative   ? Not on file       Current Outpatient Medications   Medication Sig Dispense Refill   ? atorvastatin (LIPITOR) 40 MG tablet Take 40 mg by mouth at bedtime.     ? calcium carbonate-vitamin D3 (CALCIUM WITH VITAMIN D) 600 mg(1,500mg) -400 unit per tablet Take 1 tablet by mouth 2 (two) times a day.     ? HYDROmorphone (DILAUDID) 2 MG tablet Take 2 mg by mouth every 3 (three) hours as needed for pain. 0.5 to 1 tab every 3 hours prn     ? LANTUS SOLOSTAR U-100 INSULIN 100 unit/mL (3 mL) pen Inject 45 Units under the skin at bedtime. 11.65 Type 2 with hyperglycemia  Contact provider if insulin prescribed is not the preferred insulin per insurance. 15 mL 0   ? metFORMIN (GLUCOPHAGE) 1000 MG tablet Take 1,000 mg by mouth 2 (two) times a day with meals.     ? polyethylene glycol (MIRALAX) 17 gram packet Take 1 packet (17 g total) by mouth 2 (two) times a day. 60 packet 0   ? senna-docusate (PERICOLACE) 8.6-50 mg tablet Take 1 tablet by mouth 2 (two) times a day. 60 tablet 0   ? sitaGLIPtin (JANUVIA) 100 MG tablet Take 100 mg by mouth daily.       No current facility-administered medications for this visit.      No Known Allergies  Immunization History   Administered Date(s) Administered   ? Influenza high dose, seasonal 03/26/2019   ? Influenza, inj,  historic,unspecified 09/16/2015         Review of Systems   Review of systems difficult as patient does not answer all questioning.  She states she has a headache and would like to get out of her TLSO and lie back in bed.        Physical Exam   Vital signs: /46, heart rate 70, respiratory 16, temp 97.7.   GENERAL APPEARANCE: Well developed, obese woman in no acute distress  HEENT: normocephalic, atraumatic  PERRL, sclerae anicteric, conjunctivae clear and moist, EOM intact  LUNGS: Lung sounds CTA, no adventitious sounds, respiratory effort normal.  CARD: RRR, S1, S2, without murmurs, gallops, rubs,   ABD: Soft and nontender with normal bowel sounds.   EXTREMITIES: No cyanosis, clubbing or edema.  Good CMS with positive pulses to lower extremities.  NEURO: Alert and oriented, face is symmetric.  Good lower extremity sensation.   SKIN: no new skin issues.  Did not view back incision as she is sitting up in chair with TLSO on  PSYCH: euthymic            Labs:    Recent Results (from the past 240 hour(s))   Ferritin   Result Value Ref Range    Ferritin 267 (H) 10 - 130 ng/mL   Iron   Result Value Ref Range    Iron 47 42 - 175 ug/dL   Thyroid Stimulating Hormone (TSH)   Result Value Ref Range    TSH 0.26 (L) 0.30 - 5.00 uIU/mL   HM1 (CBC with Diff)   Result Value Ref Range    WBC 7.3 4.0 - 11.0 thou/uL    RBC 3.38 (L) 3.80 - 5.40 mill/uL    Hemoglobin 10.7 (L) 12.0 - 16.0 g/dL    Hematocrit 33.3 (L) 35.0 - 47.0 %    MCV 99 80 - 100 fL    MCH 31.7 27.0 - 34.0 pg    MCHC 32.1 32.0 - 36.0 g/dL    RDW 14.1 11.0 - 14.5 %    Platelets 333 140 - 440 thou/uL    MPV 8.7 8.5 - 12.5 fL    Neutrophils % 66 50 - 70 %    Lymphocytes % 26 20 - 40 %    Monocytes % 7 2 - 10 %    Eosinophils % 1 0 - 6 %    Basophils % 0 0 - 2 %    Neutrophils Absolute 4.8 2.0 - 7.7 thou/uL    Lymphocytes Absolute 1.9 0.8 - 4.4 thou/uL    Monocytes Absolute 0.5 0.0 - 0.9 thou/uL    Eosinophils Absolute 0.1 0.0 - 0.4 thou/uL    Basophils Absolute 0.0  0.0 - 0.2 thou/uL         Assessment:  1. S/P laminectomy     2. Closed compression fracture of third lumbar vertebra, sequela     3. Pain management     4. Type 2 diabetes mellitus with hyperglycemia, with long-term current use of insulin (H)         Plan:   Status post laminectomy and compression fracture: She was seen by neurosurgery yesterday and everything appears to be going well and will return next week for removal of her staples.    Pain management: I will decrease her Dilaudid dose to 0.5 mg to 1 mg every 4 hours as it appears her pain is improving.    Diabetes: This is stable on her med changes from last week we will continue with Lantus at 42 units, sitagliptin and metformin.              Electronically signed by: Teri Brush CNP

## 2021-06-19 NOTE — LETTER
Letter by Teri Brush CNP at      Author: Teri Brush CNP Service: -- Author Type: --    Filed:  Encounter Date: 5/2/2019 Status: (Other)         Patient: Trinidad Brown   MR Number: [id number]   YOB: 1945   Date of Visit: 5/2/2019     Code Status:  DNR  Visit Type: Follow Up     Facility:  [facility] [[id number]]      Facility Type: SNF (Skilled Nursing Facility, TCU)    History of Present Illness:    Trinidad Brown is a 74 y.o. female was seen today for follow-up TCU visit.  She had a hospitalization at [facility] 3/20 424/2/2019.  She has a past medical history for 4 type 2 diabetes.  She had a fall at home with persistent pain and was seen in the ER.  She had a CT of her spine which showed significant spinal stenosis of her L3 to L5-S1.  An MRI showed lumbar spondylosis with mild acute active compression fracture and moderate edema.  She did have a disc herniation and ligamentous changes on MRI.  Neurosurgery was consulted and she was taken to surgery on 3/29/19 for an L-spine decompressive laminectomy.  Her incision site was stapled closed and postop course was complicated by acute blood loss anemia with a discharge hemoglobin of 8.7 down from baseline of 14.9.  He was also treated for an E. coli UTI with 5 days of amoxicillin.  She was discharged to the TCU with orders to wear a TLSO when out of bed.  She also had significant left foot drop which per patient was chronic and an AFO was ordered.  She did show cognitive impairment however due to cultural issues they were unable to do good cognitive testing while in the hospital.  Her daughter had reported to hospital staff that her memory issues were declining the past couple months.  It was also recommended that she have osteoporosis workup as an outpatient.    Today, I met with her with video  and therapy.  At this time, her progress with her function is at a stand still due to the limitations of TLSO and awaiting  the AFOs arrival.  She did have a recent spine xray which indicated the same vertebral fracture.  She is not seeing neurosurgery until 5/13.  I dicussed this with Trinidad and she would like to try therapy without the TLSO as she is not having any pain.  She has not been taking any pain medications this week.  Her BS and BPs are stable.     Current Outpatient Medications   Medication Sig Dispense Refill   ? acetaminophen (TYLENOL) 325 MG tablet Take 650 mg by mouth every 6 (six) hours as needed for pain.     ? atorvastatin (LIPITOR) 40 MG tablet Take 40 mg by mouth at bedtime.     ? calcium carbonate-vitamin D3 (CALCIUM WITH VITAMIN D) 600 mg(1,500mg) -400 unit per tablet Take 1 tablet by mouth 2 (two) times a day.     ? LANTUS SOLOSTAR U-100 INSULIN 100 unit/mL (3 mL) pen Inject 45 Units under the skin at bedtime. 11.65 Type 2 with hyperglycemia  Contact provider if insulin prescribed is not the preferred insulin per insurance. (Patient taking differently: Inject 38 Units under the skin at bedtime. 11.65 Type 2 with hyperglycemia  Contact provider if insulin prescribed is not the preferred insulin per insurance.      ) 15 mL 0   ? metFORMIN (GLUCOPHAGE) 1000 MG tablet Take 1,000 mg by mouth 2 (two) times a day with meals.     ? polyethylene glycol (MIRALAX) 17 gram packet Take 1 packet (17 g total) by mouth 2 (two) times a day. (Patient taking differently: Take 17 g by mouth daily as needed.       ) 60 packet 0   ? senna-docusate (PERICOLACE) 8.6-50 mg tablet Take 1 tablet by mouth 2 (two) times a day. 60 tablet 0   ? sitaGLIPtin (JANUVIA) 100 MG tablet Take 100 mg by mouth daily.       No current facility-administered medications for this visit.      No Known Allergies  Immunization History   Administered Date(s) Administered   ? Influenza high dose, seasonal 03/26/2019   ? Influenza, inj, historic,unspecified 09/16/2015         Review of Systems   Patient denies fever, chills, headache, lightheadedness, dizziness,  rhinorrhea, cough, congestion, shortness of breath, chest pain, palpitations, abdominal pain, n/v, diarrhea, constipation, change in appetite, dysuria, frequency, burning or pain with urination.  Other than stated in HPI all other review of systems is negative.       Physical Exam   Vital signs: /70, HR 85, resp 20, temp 97.5   GENERAL APPEARANCE: Well developed, obese woman in no acute distress  HEENT: normocephalic, atraumatic  PERRL, sclerae anicteric, conjunctivae clear and moist, EOM intact  LUNGS: Lung sounds CTA, no adventitious sounds, respiratory effort normal.  CARD: RRR, S1, S2, without murmurs, gallops, rubs,   ABD: Soft and nontender with normal bowel sounds.   MSK: foot drop right foot  EXTREMITIES: No cyanosis, clubbing or edema.   No s/s of DVT  NEURO: Alert and oriented, face is symmetric.  Good lower extremity sensation.   SKIN: no new skin issues.    PSYCH: euthymic            Labs:    Recent Results (from the past 240 hour(s))   Urinalysis   Result Value Ref Range    Color, UA Yellow Colorless, Yellow, Straw, Light Yellow    Clarity, UA Cloudy (!) Clear    Glucose, UA Negative Negative    Bilirubin, UA Negative Negative    Ketones, UA Negative Negative    Specific Gravity, UA 1.020 1.001 - 1.030    Blood, UA Negative Negative    pH, UA 6.5 4.5 - 8.0    Protein, UA Trace (!) Negative mg/dL    Urobilinogen, UA <2.0 E.U./dL <2.0 E.U./dL, 2.0 E.U./dL    Nitrite, UA Positive (!) Negative    Leukocytes, UA Large (!) Negative    Bacteria, UA Many (!) None Seen hpf    RBC, UA 0-2 None Seen, 0-2 hpf    WBC, UA  (!) None Seen, 0-5 hpf    Squam Epithel, UA 0-5 None Seen, 0-5 lpf    Mucus, UA Many (!) None Seen lpf   Culture, Urine   Result Value Ref Range    Culture >100,000 col/ml Escherichia coli (!)     Culture 10,000-50,000 col/ml Citrobacter freundii (!)        Susceptibility    Citrobacter freundii - CR     Amoxicillin + Clavulanate 16/8 Resistant      Ampicillin >16 Resistant       Cefazolin >16 Resistant      Cefepime <=1 Sensitive      Ceftriaxone <=1 Sensitive      Ciprofloxacin <=0.5 Sensitive      Gentamicin <=2 Sensitive      Levofloxacin <=1 Sensitive      Meropenem <=0.25 Sensitive      Nitrofurantoin <=16 Sensitive      Tetracycline <=2 Sensitive      Tobramycin <=2 Sensitive      Trimethoprim + Sulfamethoxazole <=0.5 Sensitive     Escherichia coli - CR     Amoxicillin + Clavulanate <=4/2 Sensitive      Ampicillin <=4 Sensitive      Cefazolin 2 Sensitive      Cefepime <=1 Sensitive      Ceftriaxone <=1 Sensitive      Ciprofloxacin <=0.5 Sensitive      Gentamicin <=2 Sensitive      Levofloxacin <=1 Sensitive      Meropenem <=0.25 Sensitive      Nitrofurantoin <=16 Sensitive      Tetracycline <=2 Sensitive      Tobramycin <=2 Sensitive      Trimethoprim + Sulfamethoxazole <=0.5 Sensitive            Assessment:  1. S/P laminectomy     2. Closed compression fracture of third lumbar vertebra, sequela     3. Pain management     4. Acute cystitis without hematuria         Plan:   At this time, in order to progress her functionality and purpose for a TCU I will allow her try to do therapy without the TLSO as long as she is painless.  Awaiting AFO and therapy thinks this will be available Friday or early next week.      UTI: cultures show sensitivity to Bactrim and she is no long symptomatic so she will continue with the Bactrim through this week.                 Electronically signed by: Teri Brush CNP

## 2021-06-19 NOTE — LETTER
"Letter by Teri Brush CNP at      Author: Teri Brush CNP Service: -- Author Type: --    Filed:  Encounter Date: 5/20/2019 Status: (Other)         Patient: Trinidad Brown   MR Number: [id number]   YOB: 1945   Date of Visit: 5/20/2019     Code Status:  DNR  Visit Type: Follow Up     Facility:  [facility] [[id number]]      Facility Type: SNF (Skilled Nursing Facility, TCU)    History of Present Illness:    Trinidad Brown is a 74 y.o. female was seen today for follow-up TCU visit.  She had a hospitalization at [facility] 3/20 424/2/2019.  She has a past medical history for 4 type 2 diabetes.  She had a fall at home with persistent pain and was seen in the ER.  She had a CT of her spine which showed significant spinal stenosis of her L3 to L5-S1.  An MRI showed lumbar spondylosis with mild acute active compression fracture and moderate edema.  She did have a disc herniation and ligamentous changes on MRI.  Neurosurgery was consulted and she was taken to surgery on 3/29/19 for an L-spine decompressive laminectomy.  Her incision site was stapled closed and postop course was complicated by acute blood loss anemia with a discharge hemoglobin of 8.7 down from baseline of 14.9.  He was also treated for an E. coli UTI with 5 days of amoxicillin.  She was discharged to the TCU with orders to wear a TLSO when out of bed.  She also had significant left foot drop which per patient was chronic and an AFO was ordered.  She did show cognitive impairment however due to cultural issues they were unable to do good cognitive testing while in the hospital.  Her daughter had reported to hospital staff that her memory issues were declining the past couple months.  It was also recommended that she have osteoporosis workup as an outpatient.    Today, I examine the patient with a video .  She denies any discomfort.  She continues to report ongoing \"numbness\" of her lower extremities.  She has " gotten her custom AFOs which helps with transfers.  Therapy tells me she has maximized her progress and most likely would be transferring to home next week with family support.  She complains of having to wear the TLSO and is not able to tolerate it fitted tightly as it causes her breathing difficulties.  She reports she is eating better this week and has been drinking the Glucerna drinks.  She has shown another 1 pound weight loss with a total of 13 pounds down.  Her blood sugars are well maintained on the current dose of Lantus at 34 units, metformin and sitagliptin.  Her blood sugars range from 90s to 160s.    Current Outpatient Medications   Medication Sig Dispense Refill   ? calcium, as carbonate, (TUMS) 200 mg calcium (500 mg) chewable tablet Chew 2 tablets daily.     ? insulin glargine (LANTUS) 100 unit/mL injection Inject 34 Units under the skin at bedtime.     ? acetaminophen (TYLENOL) 325 MG tablet Take 650 mg by mouth every 6 (six) hours as needed for pain.     ? alendronate (FOSAMAX) 10 MG tablet Take 70 mg by mouth once a week. Take in the morning on an empty stomach with a full glass of water 30 minutes before food     ? atorvastatin (LIPITOR) 40 MG tablet Take 40 mg by mouth at bedtime.     ? cholecalciferol, vitamin D3, 1,000 unit tablet Take 2,000 Units by mouth daily.            ? LANTUS SOLOSTAR U-100 INSULIN 100 unit/mL (3 mL) pen Inject 45 Units under the skin at bedtime. 11.65 Type 2 with hyperglycemia  Contact provider if insulin prescribed is not the preferred insulin per insurance. (Patient taking differently: Inject 34 Units under the skin at bedtime. 11.65 Type 2 with hyperglycemia  Contact provider if insulin prescribed is not the preferred insulin per insurance.      ) 15 mL 0   ? metFORMIN (GLUCOPHAGE) 1000 MG tablet Take 1,000 mg by mouth 2 (two) times a day with meals.     ? polyethylene glycol (MIRALAX) 17 gram packet Take 17 g by mouth daily.     ? senna-docusate (PERICOLACE) 8.6-50  mg tablet Take 1 tablet by mouth daily.     ? sitaGLIPtin (JANUVIA) 100 MG tablet Take 100 mg by mouth daily.       No current facility-administered medications for this visit.      No Known Allergies  Immunization History   Administered Date(s) Administered   ? Influenza high dose, seasonal 03/26/2019   ? Influenza, inj, historic,unspecified 09/16/2015         Review of Systems   Patient denies fever, chills, headache, lightheadedness, dizziness, rhinorrhea, cough, congestion, shortness of breath, chest pain, palpitations, abdominal pain, n/v, diarrhea, constipation, change in appetite, dysuria, frequency, burning or pain with urination.  Other than stated in HPI all other review of systems is negative.       Physical Exam   Vital signs: /72, heart rate 86, respiratory 18, temp 97.5   GENERAL APPEARANCE: Well developed, obese woman in no acute distress  HEENT: normocephalic, atraumatic  PERRL, sclerae anicteric, conjunctivae clear and moist, EOM intact  LUNGS: lungs CTA, respiratory effort normal.  CARD: RRR, S1, S2, without murmurs, gallops, rubs,   Abdomen: soft, non-distended, non-tender, +BS x 4   MSK: foot drop right foot  EXTREMITIES: No cyanosis, clubbing or edema.   No s/s of DVT  NEURO: Alert and oriented, face is symmetric.  Good lower extremity sensation.   SKIN: no new lesions, rashes or petechia   PSYCH: euthymic            Labs:    Recent Results (from the past 240 hour(s))   Vitamin D, Total (25-Hydroxy)   Result Value Ref Range    Vitamin D, Total (25-Hydroxy) 34.4 30.0 - 80.0 ng/mL           Assessment:  1. S/P laminectomy     2. Closed compression fracture of third lumbar vertebra, sequela     3. Type 2 diabetes mellitus with hyperglycemia, with long-term current use of insulin (H)     4. Age-related osteoporosis with current pathological fracture with routine healing, subsequent encounter     5. Vitamin D deficiency         Plan:   Close compression fracture: I counseled her family on  neurosurgery continuing to recommend the TLSO for another 3 more weeks until x-rays.  I will have [facility] come out and see if there is any adjustments or recommendations they can make in order to help her have support and tolerate.     Diabetes: Stable at this time we will continue Lantus at 34 units, sitagliptin and metformin at current doses.  Continue with Glucerna for supplementation.  I counseled patient on rationale for Glucerna in order to continue to stabilize her weight and blood sugars.    Osteoporosis: Continue on alendronate as she is tolerating and continue with calcium supplements.  Counseled family on risks of osteoporosis and her need for a bone scan when she is able to  clinic.  Also counseled them on the use of alendronate and calcium to decrease risk of future fractures.    Vitamin D deficiency: Continue on 2000 international units of vitamin D and will need a recheck of levels in a month.      35 total minutes spent with 20 minutes spent face-to-face with patient and family in coordination and counseling of the above plan of care.  Coordination is in efforts to maximize her transition plan of going home with therapy services.          Electronically signed by: Teri Brush, EDDIE

## 2021-06-19 NOTE — LETTER
Letter by Teri Brush CNP at      Author: Teri Brush CNP Service: -- Author Type: --    Filed:  Encounter Date: 5/6/2019 Status: (Other)         Patient: Trinidad Brown   MR Number: [id number]   YOB: 1945   Date of Visit: 5/6/2019     Code Status:  DNR  Visit Type: Follow Up     Facility:  [facility] [[id number]]      Facility Type: SNF (Skilled Nursing Facility, TCU)    History of Present Illness:    Trinidad Brown is a 74 y.o. female was seen today for follow-up TCU visit.  She had a hospitalization at [facility] 3/20 424/2/2019.  She has a past medical history for 4 type 2 diabetes.  She had a fall at home with persistent pain and was seen in the ER.  She had a CT of her spine which showed significant spinal stenosis of her L3 to L5-S1.  An MRI showed lumbar spondylosis with mild acute active compression fracture and moderate edema.  She did have a disc herniation and ligamentous changes on MRI.  Neurosurgery was consulted and she was taken to surgery on 3/29/19 for an L-spine decompressive laminectomy.  Her incision site was stapled closed and postop course was complicated by acute blood loss anemia with a discharge hemoglobin of 8.7 down from baseline of 14.9.  He was also treated for an E. coli UTI with 5 days of amoxicillin.  She was discharged to the TCU with orders to wear a TLSO when out of bed.  She also had significant left foot drop which per patient was chronic and an AFO was ordered.  She did show cognitive impairment however due to cultural issues they were unable to do good cognitive testing while in the hospital.  Her daughter had reported to hospital staff that her memory issues were declining the past couple months.  It was also recommended that she have osteoporosis workup as an outpatient.    Today, I met with patient with the video  and therapy.  Today she is more interactive and wants to participate in therapy and even go to the bathroom for  toileting.  Therapy is still awaiting her AFOs in order to progress her ambulation.  Last week I did order for her to do therapy without the TLSO as this is quite a barrier in order for us to get her to cooperate and participate in therapy.  Eyes any pain and has not been taking any pain medications.  Her incision is well approximated and healed.  Her blood sugars have been decreasing ranging from 70-140s.  She reports she does have a good appetite and feels she is eating well.  Her weight has not been checked since her admission.    Current Outpatient Medications   Medication Sig Dispense Refill   ? acetaminophen (TYLENOL) 325 MG tablet Take 650 mg by mouth every 6 (six) hours as needed for pain.     ? atorvastatin (LIPITOR) 40 MG tablet Take 40 mg by mouth at bedtime.     ? calcium carbonate-vitamin D3 (CALCIUM WITH VITAMIN D) 600 mg(1,500mg) -400 unit per tablet Take 1 tablet by mouth 2 (two) times a day.     ? LANTUS SOLOSTAR U-100 INSULIN 100 unit/mL (3 mL) pen Inject 45 Units under the skin at bedtime. 11.65 Type 2 with hyperglycemia  Contact provider if insulin prescribed is not the preferred insulin per insurance. (Patient taking differently: Inject 35 Units under the skin at bedtime. 11.65 Type 2 with hyperglycemia  Contact provider if insulin prescribed is not the preferred insulin per insurance.      ) 15 mL 0   ? metFORMIN (GLUCOPHAGE) 1000 MG tablet Take 1,000 mg by mouth 2 (two) times a day with meals.     ? sitaGLIPtin (JANUVIA) 100 MG tablet Take 100 mg by mouth daily.       No current facility-administered medications for this visit.      No Known Allergies  Immunization History   Administered Date(s) Administered   ? Influenza high dose, seasonal 03/26/2019   ? Influenza, inj, historic,unspecified 09/16/2015         Review of Systems   Patient denies fever, chills, headache, lightheadedness, dizziness, rhinorrhea, cough, congestion, shortness of breath, chest pain, palpitations, abdominal pain,  n/v, diarrhea, constipation, change in appetite, dysuria, frequency, burning or pain with urination.  Other than stated in HPI all other review of systems is negative.       Physical Exam   Vital signs: /68, heart rate 84, respiratory 22, temp 97.2.   GENERAL APPEARANCE: Well developed, obese woman in no acute distress  HEENT: normocephalic, atraumatic  PERRL, sclerae anicteric, conjunctivae clear and moist, EOM intact  LUNGS:respiratory effort normal.  CARD: RRR, S1, S2, without murmurs, gallops, rubs,   MSK: foot drop right foot  EXTREMITIES: No cyanosis, clubbing or edema.   No s/s of DVT  NEURO: Alert and oriented, face is symmetric.  Good lower extremity sensation.   SKIN: no new skin issues.    PSYCH: euthymic            Labs:    Recent Results (from the past 240 hour(s))   Urinalysis   Result Value Ref Range    Color, UA Yellow Colorless, Yellow, Straw, Light Yellow    Clarity, UA Cloudy (!) Clear    Glucose, UA Negative Negative    Bilirubin, UA Negative Negative    Ketones, UA Negative Negative    Specific Gravity, UA 1.020 1.001 - 1.030    Blood, UA Negative Negative    pH, UA 6.5 4.5 - 8.0    Protein, UA Trace (!) Negative mg/dL    Urobilinogen, UA <2.0 E.U./dL <2.0 E.U./dL, 2.0 E.U./dL    Nitrite, UA Positive (!) Negative    Leukocytes, UA Large (!) Negative    Bacteria, UA Many (!) None Seen hpf    RBC, UA 0-2 None Seen, 0-2 hpf    WBC, UA  (!) None Seen, 0-5 hpf    Squam Epithel, UA 0-5 None Seen, 0-5 lpf    Mucus, UA Many (!) None Seen lpf   Culture, Urine   Result Value Ref Range    Culture >100,000 col/ml Escherichia coli (!)     Culture 10,000-50,000 col/ml Citrobacter freundii (!)        Susceptibility    Citrobacter freundii - CR     Amoxicillin + Clavulanate 16/8 Resistant      Ampicillin >16 Resistant      Cefazolin >16 Resistant      Cefepime <=1 Sensitive      Ceftriaxone <=1 Sensitive      Ciprofloxacin <=0.5 Sensitive      Gentamicin <=2 Sensitive      Levofloxacin <=1  Sensitive      Meropenem <=0.25 Sensitive      Nitrofurantoin <=16 Sensitive      Tetracycline <=2 Sensitive      Tobramycin <=2 Sensitive      Trimethoprim + Sulfamethoxazole <=0.5 Sensitive     Escherichia coli - CR     Amoxicillin + Clavulanate <=4/2 Sensitive      Ampicillin <=4 Sensitive      Cefazolin 2 Sensitive      Cefepime <=1 Sensitive      Ceftriaxone <=1 Sensitive      Ciprofloxacin <=0.5 Sensitive      Gentamicin <=2 Sensitive      Levofloxacin <=1 Sensitive      Meropenem <=0.25 Sensitive      Nitrofurantoin <=16 Sensitive      Tetracycline <=2 Sensitive      Tobramycin <=2 Sensitive      Trimethoprim + Sulfamethoxazole <=0.5 Sensitive            Assessment:  1. S/P laminectomy     2. Closed compression fracture of third lumbar vertebra, sequela     3. Type 2 diabetes mellitus with hyperglycemia, with long-term current use of insulin (H)     4. Acute cystitis without hematuria         Plan:   Status post laminectomy and compression fracture: Continue with therapies without the TLSO as long as she is pain-free.  She will follow-up with neurosurgery next week as currently planned.    Diabetes: I will have nursing check her weights weekly as I am not sure if she is actually eating adequately enough and so if she has noted weight loss could likely be that she is not eating well enough and that is why her blood sugars are lower.  At this time I am going to decrease her Lantus at 35 units, and continue metformin 1000 mg twice daily.    UTI: Resolved and antibiotic is now complete.                Electronically signed by: Teri Brush, EDDIE

## 2021-06-19 NOTE — LETTER
Letter by Teri Brush CNP at      Author: Teri Brush CNP Service: -- Author Type: --    Filed:  Encounter Date: 4/10/2019 Status: (Other)         Patient: Trinidad Brown   MR Number: [id number]   YOB: 1945   Date of Visit: 4/10/2019     Code Status:  DNR  Visit Type: Follow Up     Facility:  [facility] [[id number]]      Facility Type: SNF (Skilled Nursing Facility, TCU)    History of Present Illness:    rTinidad Brown is a 74 y.o. female seen today for follow-up TCU visit.  She had a hospitalization at [facility] 3/20 424/2/2019.  She has a past medical history for 4 type 2 diabetes.  She had a fall at home with persistent pain and was seen in the ER.  She had a CT of her spine which showed significant spinal stenosis of her L3 to L5-S1.  An MRI showed lumbar spondylosis with mild acute active compression fracture and moderate edema.  She did have a disc herniation and ligamentous changes on MRI.  Neurosurgery was consulted and she was taken to surgery on 3/29/19 for an L-spine decompressive laminectomy.  Her incision site was stapled closed and postop course was complicated by acute blood loss anemia with a discharge hemoglobin of 8.7 down from baseline of 14.9.  He was also treated for an E. coli UTI with 5 days of amoxicillin.  She was discharged to the TCU with orders to wear a TLSO when out of bed.  She also had significant left foot drop which per patient was chronic and an AFO was ordered.  She did show cognitive impairment however due to cultural issues they were unable to do good cognitive testing while in the hospital.  Her daughter had reported to hospital staff that her memory issues were declining the past couple months.  It was also recommended that she have osteoporosis workup as an outpatient.    Today I meet with her with a video .  She reports feeling about the same however her pain is improved with the use of as needed Dilaudid.  She uses the  Dilaudid approximately 2 times a day.  Her blood sugars have been good between 89 and 188 after discontinuing the glipizide this week.  Her last day of ciprofloxacin for her UTI is 4/11 and she denies any urinary symptoms.    Past Medical History:   Diagnosis Date   ? Squamous cell carcinoma of back    ? Type 2 diabetes mellitus with hyperglycemia (H) 12/9/2015     Past Surgical History:   Procedure Laterality Date   ? BACK SURGERY      tumor removal   ? LUMBAR LAMINECTOMY Bilateral 3/29/2019    Procedure: Lumbar 3- Lumbar-4, Lumbar 4-Lumbar -5, Lumbar 5-Sacral 1,  decompressive laminectomy.;  Surgeon: Naeem Thomas MD;  Location: [facility] OR;  Service: Spine     Family History   Problem Relation Age of Onset   ? Stroke Daughter    ? Hypertension Daughter    ? No Medical Problems Son      Social History     Socioeconomic History   ? Marital status: Single     Spouse name: Not on file   ? Number of children: Not on file   ? Years of education: Not on file   ? Highest education level: Not on file   Occupational History   ? Not on file   Social Needs   ? Financial resource strain: Not on file   ? Food insecurity:     Worry: Not on file     Inability: Not on file   ? Transportation needs:     Medical: Not on file     Non-medical: Not on file   Tobacco Use   ? Smoking status: Never Smoker   ? Smokeless tobacco: Never Used   Substance and Sexual Activity   ? Alcohol use: No   ? Drug use: No   ? Sexual activity: Yes     Birth control/protection: Post-menopausal   Lifestyle   ? Physical activity:     Days per week: Not on file     Minutes per session: Not on file   ? Stress: Not on file   Relationships   ? Social connections:     Talks on phone: Not on file     Gets together: Not on file     Attends [employer]ism service: Not on file     Active member of club or organization: Not on file     Attends meetings of clubs or organizations: Not on file     Relationship status: Not on file   ? Intimate partner violence:      Fear of current or ex partner: Not on file     Emotionally abused: Not on file     Physically abused: Not on file     Forced sexual activity: Not on file   Other Topics Concern   ? Not on file   Social History Narrative   ? Not on file       Current Outpatient Medications   Medication Sig Dispense Refill   ? atorvastatin (LIPITOR) 40 MG tablet Take 40 mg by mouth at bedtime.     ? calcium carbonate-vitamin D3 (CALCIUM WITH VITAMIN D) 600 mg(1,500mg) -400 unit per tablet Take 1 tablet by mouth 2 (two) times a day.     ? glipiZIDE (GLUCOTROL) 5 MG tablet Take 5 mg by mouth 2 (two) times a day before meals.     ? HYDROmorphone (DILAUDID) 2 MG tablet Take 2 mg by mouth every 3 (three) hours as needed for pain. 0.5 to 1 tab every 3 hours prn     ? LANTUS SOLOSTAR U-100 INSULIN 100 unit/mL (3 mL) pen Inject 45 Units under the skin at bedtime. 11.65 Type 2 with hyperglycemia  Contact provider if insulin prescribed is not the preferred insulin per insurance. 15 mL 0   ? metFORMIN (GLUCOPHAGE) 1000 MG tablet Take 1,000 mg by mouth 2 (two) times a day with meals.     ? polyethylene glycol (MIRALAX) 17 gram packet Take 1 packet (17 g total) by mouth 2 (two) times a day. 60 packet 0   ? senna-docusate (PERICOLACE) 8.6-50 mg tablet Take 1 tablet by mouth 2 (two) times a day. 60 tablet 0   ? sitaGLIPtin (JANUVIA) 100 MG tablet Take 100 mg by mouth daily.       No current facility-administered medications for this visit.      No Known Allergies  Immunization History   Administered Date(s) Administered   ? Influenza high dose, seasonal 03/26/2019   ? Influenza, inj, historic,unspecified 09/16/2015         Review of Systems   Patient denies fever, chills, headache, lightheadedness, dizziness, rhinorrhea, cough, congestion, shortness of breath, chest pain, palpitations, abdominal pain, n/v, diarrhea, constipation, change in appetite, dysuria, frequency, burning or pain with urination.  Other than stated in HPI all other review of  systems is negative.         Physical Exam   Vital signs: /71, heart rate 86, respiratory rate 19, temp 97.2   GENERAL APPEARANCE: Well developed, obese woman moaning in her bed.   HEENT: normocephalic, atraumatic  PERRL, sclerae anicteric, conjunctivae clear and moist, EOM intact  LUNGS: Lung sounds CTA, no adventitious sounds, respiratory effort normal.  CARD: RRR, S1, S2, without murmurs, gallops, rubs, no JVD,   ABD: Soft and nontender with normal bowel sounds.   MSK: Lower extremities show strength of 4/5, upper extremities show normal strength.  EXTREMITIES: No cyanosis, clubbing or edema.  Good CMS with positive pulses to lower extremities.  NEURO: Alert and oriented, face is symmetric.  Good lower extremity sensation.  Foot drop on right, able to move toes.  SKIN: Midline back lumbar surgical incision well approximated with staples, no edema, erythema, drainage.  PSYCH: euthymic            Labs:    Recent Results (from the past 240 hour(s))   POCT Glucose - 4 times daily before meals and at bedtime   Result Value Ref Range    Glucose 163 (H) 70 - 139 mg/dL   POCT Glucose - 4 times daily before meals and at bedtime   Result Value Ref Range    Glucose 160 (H) 70 - 139 mg/dL   HM2(CBC w/o Differential)   Result Value Ref Range    WBC 11.3 (H) 4.0 - 11.0 thou/uL    RBC 2.74 (L) 3.80 - 5.40 mill/uL    Hemoglobin 8.8 (L) 12.0 - 16.0 g/dL    Hematocrit 25.7 (L) 35.0 - 47.0 %    MCV 94 80 - 100 fL    MCH 32.1 27.0 - 34.0 pg    MCHC 34.2 32.0 - 36.0 g/dL    RDW 12.9 11.0 - 14.5 %    Platelets 196 140 - 440 thou/uL    MPV 9.3 8.5 - 12.5 fL   POCT Glucose - 4 times daily before meals and at bedtime   Result Value Ref Range    Glucose 245 (H) 70 - 139 mg/dL   POCT Glucose - 4 times daily before meals and at bedtime   Result Value Ref Range    Glucose 149 (H) 70 - 139 mg/dL   POCT Glucose - 4 times daily before meals and at bedtime   Result Value Ref Range    Glucose 118 70 - 139 mg/dL   POCT Glucose - 4 times  daily before meals and at bedtime   Result Value Ref Range    Glucose 153 (H) 70 - 139 mg/dL   POCT Glucose - 4 times daily before meals and at bedtime   Result Value Ref Range    Glucose 91 70 - 139 mg/dL   POCT Glucose - 4 times daily before meals and at bedtime   Result Value Ref Range    Glucose 110 70 - 139 mg/dL   Urinalysis-UC if Indicated   Result Value Ref Range    Color, UA Yellow Colorless, Yellow, Straw, Light Yellow    Clarity, UA Cloudy (!) Clear    Glucose, UA Negative Negative    Bilirubin, UA Negative Negative    Ketones, UA Negative Negative    Specific Gravity, UA 1.017 1.001 - 1.030    Blood, UA Trace (!) Negative    pH, UA 6.5 4.5 - 8.0    Protein, UA Trace (!) Negative mg/dL    Urobilinogen, UA <2.0 E.U./dL <2.0 E.U./dL, 2.0 E.U./dL    Nitrite, UA Positive (!) Negative    Leukocytes, UA Large (!) Negative    Bacteria, UA Many (!) None Seen hpf    RBC, UA 0-2 None Seen, 0-2 hpf    WBC, UA >100 (!) None Seen, 0-5 hpf    Squam Epithel, UA 10-25 (!) None Seen, 0-5 lpf    Mucus, UA Moderate (!) None Seen lpf   Culture, Urine   Result Value Ref Range    Culture Mixture of urogenital organisms with     Culture >100,000 col/ml Escherichia coli (!)        Susceptibility    Escherichia coli - CR     Amoxicillin + Clavulanate <=4/2 Sensitive      Ampicillin <=4 Sensitive      Cefazolin 2 Sensitive      Cefepime <=1 Sensitive      Ceftriaxone <=1 Sensitive      Ciprofloxacin <=0.5 Sensitive      Gentamicin <=2 Sensitive      Levofloxacin <=1 Sensitive      Meropenem <=0.25 Sensitive      Nitrofurantoin 32 Sensitive      Tetracycline <=2 Sensitive      Tobramycin <=2 Sensitive      Trimethoprim + Sulfamethoxazole <=0.5 Sensitive    Hemoglobin   Result Value Ref Range    Hemoglobin 8.8 (L) 12.0 - 16.0 g/dL   Ferritin   Result Value Ref Range    Ferritin 267 (H) 10 - 130 ng/mL   Iron   Result Value Ref Range    Iron 47 42 - 175 ug/dL   Thyroid Stimulating Hormone (TSH)   Result Value Ref Range    TSH 0.26  (L) 0.30 - 5.00 uIU/mL   HM1 (CBC with Diff)   Result Value Ref Range    WBC 7.3 4.0 - 11.0 thou/uL    RBC 3.38 (L) 3.80 - 5.40 mill/uL    Hemoglobin 10.7 (L) 12.0 - 16.0 g/dL    Hematocrit 33.3 (L) 35.0 - 47.0 %    MCV 99 80 - 100 fL    MCH 31.7 27.0 - 34.0 pg    MCHC 32.1 32.0 - 36.0 g/dL    RDW 14.1 11.0 - 14.5 %    Platelets 333 140 - 440 thou/uL    MPV 8.7 8.5 - 12.5 fL    Neutrophils % 66 50 - 70 %    Lymphocytes % 26 20 - 40 %    Monocytes % 7 2 - 10 %    Eosinophils % 1 0 - 6 %    Basophils % 0 0 - 2 %    Neutrophils Absolute 4.8 2.0 - 7.7 thou/uL    Lymphocytes Absolute 1.9 0.8 - 4.4 thou/uL    Monocytes Absolute 0.5 0.0 - 0.9 thou/uL    Eosinophils Absolute 0.1 0.0 - 0.4 thou/uL    Basophils Absolute 0.0 0.0 - 0.2 thou/uL         Assessment:  1. S/P laminectomy     2. Closed compression fracture of third lumbar vertebra, sequela     3. Pain management     4. Type 2 diabetes mellitus with hyperglycemia, with long-term current use of insulin (H)     5. Acute cystitis without hematuria         Plan:   Status post laminectomy and compression fracture: Continue with therapies and wearing TLSO when out of bed.    Pain management: Continue with as needed Dilaudid we will plan to change the order on next visit to extend hours between Dilaudid dosing to 6 hours.    Diabetes: Stable will decrease Lantus insulin from 45 units to 42 units and monitor effects.    UTI: Finishing up ciprofloxacin on 4/11.  Encouraged her to drink plenty of fluids.    TSH was less than 0.5 this week and vitamin D was a unable to run due to insufficient blood.  So we will recheck both TSH and vitamin D next week.              Electronically signed by: Teri Brush CNP

## 2021-06-19 NOTE — LETTER
Letter by Teri Brush CNP at      Author: Teri Brush CNP Service: -- Author Type: --    Filed:  Encounter Date: 4/22/2019 Status: (Other)         Patient: Trinidad Brown   MR Number: [id number]   YOB: 1945   Date of Visit: 4/22/2019     Code Status:  DNR  Visit Type: Follow Up     Facility:  [facility] [[id number]]      Facility Type: SNF (Skilled Nursing Facility, TCU)    History of Present Illness:    Trinidad Brown is a 74 y.o. female seen today for follow-up TCU visit.  She had a hospitalization at [facility] 3/20 424/2/2019.  She has a past medical history for 4 type 2 diabetes.  She had a fall at home with persistent pain and was seen in the ER.  She had a CT of her spine which showed significant spinal stenosis of her L3 to L5-S1.  An MRI showed lumbar spondylosis with mild acute active compression fracture and moderate edema.  She did have a disc herniation and ligamentous changes on MRI.  Neurosurgery was consulted and she was taken to surgery on 3/29/19 for an L-spine decompressive laminectomy.  Her incision site was stapled closed and postop course was complicated by acute blood loss anemia with a discharge hemoglobin of 8.7 down from baseline of 14.9.  He was also treated for an E. coli UTI with 5 days of amoxicillin.  She was discharged to the TCU with orders to wear a TLSO when out of bed.  She also had significant left foot drop which per patient was chronic and an AFO was ordered.  She did show cognitive impairment however due to cultural issues they were unable to do good cognitive testing while in the hospital.  Her daughter had reported to hospital staff that her memory issues were declining the past couple months.  It was also recommended that she have osteoporosis workup as an outpatient.    Today, I met with patient with a hmong speaking staff.  She denies any pain today. She as good range of motion and sensation to her lower extremities.  No s/s of DVTs.   She has been getting up in the wheelchair more often and states she has been ambulating with therapy.  She reports a good normal appetite.  Her BS have ranged from 90-160s.  Her vitamin D was checked last week and was 23.4.        Current Outpatient Medications   Medication Sig Dispense Refill   ? acetaminophen (TYLENOL) 500 MG tablet Take 1,000 mg by mouth 3 (three) times a day.     ? atorvastatin (LIPITOR) 40 MG tablet Take 40 mg by mouth at bedtime.     ? calcium carbonate-vitamin D3 (CALCIUM WITH VITAMIN D) 600 mg(1,500mg) -400 unit per tablet Take 1 tablet by mouth 2 (two) times a day.     ? HYDROmorphone (DILAUDID) 2 MG tablet Take 2 mg by mouth every 3 (three) hours as needed for pain. 0.5 to 1 tab every 3 hours prn     ? LANTUS SOLOSTAR U-100 INSULIN 100 unit/mL (3 mL) pen Inject 45 Units under the skin at bedtime. 11.65 Type 2 with hyperglycemia  Contact provider if insulin prescribed is not the preferred insulin per insurance. (Patient taking differently: Inject 42 Units under the skin at bedtime. 11.65 Type 2 with hyperglycemia  Contact provider if insulin prescribed is not the preferred insulin per insurance.      ) 15 mL 0   ? metFORMIN (GLUCOPHAGE) 1000 MG tablet Take 1,000 mg by mouth 2 (two) times a day with meals.     ? polyethylene glycol (MIRALAX) 17 gram packet Take 1 packet (17 g total) by mouth 2 (two) times a day. 60 packet 0   ? senna-docusate (PERICOLACE) 8.6-50 mg tablet Take 1 tablet by mouth 2 (two) times a day. 60 tablet 0   ? sitaGLIPtin (JANUVIA) 100 MG tablet Take 100 mg by mouth daily.       No current facility-administered medications for this visit.      No Known Allergies  Immunization History   Administered Date(s) Administered   ? Influenza high dose, seasonal 03/26/2019   ? Influenza, inj, historic,unspecified 09/16/2015         Review of Systems   Patient denies fever, chills, headache, lightheadedness, dizziness, rhinorrhea, cough, congestion, shortness of breath, chest pain,  palpitations, abdominal pain, n/v, diarrhea, constipation, change in appetite, dysuria, frequency, burning or pain with urination.  Other than stated in HPI all other review of systems is negative.             Physical Exam   Vital signs: /73, HR 86, resp 20, temp 97.9.   GENERAL APPEARANCE: Well developed, obese woman in no acute distress  HEENT: normocephalic, atraumatic  PERRL, sclerae anicteric, conjunctivae clear and moist, EOM intact  LUNGS: Lung sounds CTA, no adventitious sounds, respiratory effort normal.  CARD: RRR, S1, S2, without murmurs, gallops, rubs,   ABD: Soft and nontender with normal bowel sounds.   EXTREMITIES: No cyanosis, clubbing or edema.  Good CMS with positive pulses to lower extremities. No s/s of DVT  NEURO: Alert and oriented, face is symmetric.  Good lower extremity sensation.   SKIN: no new skin issues.    PSYCH: euthymic            Labs:    Recent Results (from the past 240 hour(s))   T4, Free   Result Value Ref Range    Free T4 1.3 0.7 - 1.8 ng/dL   Thyroid Stimulating Hormone (TSH)   Result Value Ref Range    TSH 0.44 0.30 - 5.00 uIU/mL   Vitamin D, Total (25-Hydroxy)   Result Value Ref Range    Vitamin D, Total (25-Hydroxy) 23.4 (L) 30.0 - 80.0 ng/mL           Assessment:  1. S/P laminectomy     2. Closed compression fracture of third lumbar vertebra, sequela     3. Pain management     4. Type 2 diabetes mellitus with hyperglycemia, with long-term current use of insulin (H)     5. Vitamin D deficiency         Plan:   Compression fracture and s/p laminectomy: improving, continue with TLSO, continue therapies.  See neuro surgery later this week.      Pain management: continue with dilaudid 1mg every 4 hours as needed.  Will plan to decrease this later this week.  She is only taking it 1-2 x daily.     DM: stable, will decrease lantus to 40u, continue metformin and januvia    Vit D deficiency: start vit d 50,000 x 4 weeks and recheck in 4 weeks.  Will discontinue calcium car-vit  D3 and start calcium carb 1000 daily alone.             Electronically signed by: Teri Brush, EDDIE

## 2021-07-03 NOTE — ANESTHESIA PREPROCEDURE EVALUATION
Anesthesia Preprocedure Evaluation by Jess Mota MD at 3/29/2019 10:52 AM     Author: Jess Mota MD Service: -- Author Type: Physician    Filed: 3/29/2019 11:00 AM Date of Service: 3/29/2019 10:52 AM Status: Addendum    : Jess Mota MD (Physician)    Related Notes: Original Note by Jess Mota MD (Physician) filed at 3/29/2019 10:56 AM       Anesthesia Evaluation      Patient summary reviewed   History of anesthetic complications     Airway   Mallampati: III  Neck ROM: full   Pulmonary - normal exam    breath sounds clear to auscultation                         Cardiovascular   Exercise tolerance: < 4 METS  (+) , hypercholesterolemia,     ECG reviewed  Rhythm: regular  Rate: normal,      ROS comment: 1. Normal left ventricular size and systolic performance with a visually estimated ejection fraction of 60%.   2. There is mild concentric increase in left ventricular wall thickness.   3. There is mild aortic insufficiency.   4. Normal right ventricular size and systolic performance.      Neuro/Psych      Endo/Other    (+) diabetes mellitus type 2 poorly controlled, obesity (BMi 34),      GI/Hepatic/Renal       Other findings: Lab Results       Component                Value               Date                       HGBA1C                   11.3 (H)            03/26/2019             Obese habitus  Low muscle tone      Dental                             Anesthesia Plan  Planned anesthetic: general endotracheal  Soft bite block to prevent dental trauma  2 large bore PIVs and an A-line prior to induction as patient has had poorly controlled diabetes  ASA 3   Induction: intravenous   Anesthetic plan and risks discussed with: patient and child/children  Anesthesia plan special considerations: increased risk of difficult airway, antiemetics, arterial catheterization, IV therapy two IVs,   Post-op plan: routine recovery

## 2023-08-06 ENCOUNTER — APPOINTMENT (OUTPATIENT)
Dept: RADIOLOGY | Facility: HOSPITAL | Age: 78
DRG: 493 | End: 2023-08-06
Attending: EMERGENCY MEDICINE
Payer: COMMERCIAL

## 2023-08-06 ENCOUNTER — APPOINTMENT (OUTPATIENT)
Dept: CT IMAGING | Facility: HOSPITAL | Age: 78
DRG: 493 | End: 2023-08-06
Attending: STUDENT IN AN ORGANIZED HEALTH CARE EDUCATION/TRAINING PROGRAM
Payer: COMMERCIAL

## 2023-08-06 ENCOUNTER — HOSPITAL ENCOUNTER (INPATIENT)
Facility: HOSPITAL | Age: 78
LOS: 3 days | Discharge: NURSING, REHAB OR LONG-TERM CARE | DRG: 493 | End: 2023-08-09
Attending: EMERGENCY MEDICINE | Admitting: INTERNAL MEDICINE
Payer: COMMERCIAL

## 2023-08-06 DIAGNOSIS — S82.843A CLOSED BIMALLEOLAR FRACTURE, UNSPECIFIED LATERALITY, INITIAL ENCOUNTER: ICD-10-CM

## 2023-08-06 PROBLEM — F02.80 ALZHEIMER'S DEMENTIA (H): Status: ACTIVE | Noted: 2023-08-06

## 2023-08-06 PROBLEM — G30.9 ALZHEIMER'S DEMENTIA (H): Status: ACTIVE | Noted: 2023-08-06

## 2023-08-06 LAB
ALBUMIN UR-MCNC: 10 MG/DL
APPEARANCE UR: ABNORMAL
BACTERIA #/AREA URNS HPF: ABNORMAL /HPF
BILIRUB UR QL STRIP: NEGATIVE
COLOR UR AUTO: YELLOW
ERYTHROCYTE [DISTWIDTH] IN BLOOD BY AUTOMATED COUNT: 12.5 % (ref 10–15)
GLUCOSE BLDC GLUCOMTR-MCNC: 104 MG/DL (ref 70–99)
GLUCOSE BLDC GLUCOMTR-MCNC: 127 MG/DL (ref 70–99)
GLUCOSE UR STRIP-MCNC: NEGATIVE MG/DL
HBA1C MFR BLD: 6.1 %
HCT VFR BLD AUTO: 50.5 % (ref 35–47)
HGB BLD-MCNC: 17.1 G/DL (ref 11.7–15.7)
HGB UR QL STRIP: ABNORMAL
INR PPP: 1.07 (ref 0.85–1.15)
KETONES UR STRIP-MCNC: 20 MG/DL
LEUKOCYTE ESTERASE UR QL STRIP: ABNORMAL
MCH RBC QN AUTO: 32.1 PG (ref 26.5–33)
MCHC RBC AUTO-ENTMCNC: 33.9 G/DL (ref 31.5–36.5)
MCV RBC AUTO: 95 FL (ref 78–100)
MUCOUS THREADS #/AREA URNS LPF: PRESENT /LPF
NITRATE UR QL: POSITIVE
PH UR STRIP: 6 [PH] (ref 5–7)
PLATELET # BLD AUTO: 153 10E3/UL (ref 150–450)
RBC # BLD AUTO: 5.32 10E6/UL (ref 3.8–5.2)
RBC URINE: 3 /HPF
SP GR UR STRIP: 1.02 (ref 1–1.03)
SQUAMOUS EPITHELIAL: 4 /HPF
UROBILINOGEN UR STRIP-MCNC: <2 MG/DL
WBC # BLD AUTO: 6.8 10E3/UL (ref 4–11)
WBC URINE: 98 /HPF

## 2023-08-06 PROCEDURE — 29515 APPLICATION SHORT LEG SPLINT: CPT | Mod: RT

## 2023-08-06 PROCEDURE — 83036 HEMOGLOBIN GLYCOSYLATED A1C: CPT | Performed by: INTERNAL MEDICINE

## 2023-08-06 PROCEDURE — 73610 X-RAY EXAM OF ANKLE: CPT | Mod: RT

## 2023-08-06 PROCEDURE — 73590 X-RAY EXAM OF LOWER LEG: CPT | Mod: RT

## 2023-08-06 PROCEDURE — 82962 GLUCOSE BLOOD TEST: CPT

## 2023-08-06 PROCEDURE — 93005 ELECTROCARDIOGRAM TRACING: CPT | Performed by: EMERGENCY MEDICINE

## 2023-08-06 PROCEDURE — 87086 URINE CULTURE/COLONY COUNT: CPT | Performed by: EMERGENCY MEDICINE

## 2023-08-06 PROCEDURE — 36415 COLL VENOUS BLD VENIPUNCTURE: CPT | Performed by: EMERGENCY MEDICINE

## 2023-08-06 PROCEDURE — 81001 URINALYSIS AUTO W/SCOPE: CPT | Performed by: EMERGENCY MEDICINE

## 2023-08-06 PROCEDURE — 85027 COMPLETE CBC AUTOMATED: CPT | Performed by: EMERGENCY MEDICINE

## 2023-08-06 PROCEDURE — 250N000013 HC RX MED GY IP 250 OP 250 PS 637: Performed by: INTERNAL MEDICINE

## 2023-08-06 PROCEDURE — 99223 1ST HOSP IP/OBS HIGH 75: CPT | Performed by: INTERNAL MEDICINE

## 2023-08-06 PROCEDURE — 99285 EMERGENCY DEPT VISIT HI MDM: CPT | Mod: 25

## 2023-08-06 PROCEDURE — 71045 X-RAY EXAM CHEST 1 VIEW: CPT

## 2023-08-06 PROCEDURE — 85610 PROTHROMBIN TIME: CPT | Performed by: EMERGENCY MEDICINE

## 2023-08-06 PROCEDURE — 250N000013 HC RX MED GY IP 250 OP 250 PS 637: Performed by: EMERGENCY MEDICINE

## 2023-08-06 PROCEDURE — 120N000001 HC R&B MED SURG/OB

## 2023-08-06 PROCEDURE — 73700 CT LOWER EXTREMITY W/O DYE: CPT | Mod: RT

## 2023-08-06 RX ORDER — ACETAMINOPHEN 325 MG/1
975 TABLET ORAL EVERY 8 HOURS
Status: DISCONTINUED | OUTPATIENT
Start: 2023-08-06 | End: 2023-08-07

## 2023-08-06 RX ORDER — ONDANSETRON 2 MG/ML
4 INJECTION INTRAMUSCULAR; INTRAVENOUS EVERY 6 HOURS PRN
Status: DISCONTINUED | OUTPATIENT
Start: 2023-08-06 | End: 2023-08-07

## 2023-08-06 RX ORDER — HYDROCODONE BITARTRATE AND ACETAMINOPHEN 5; 325 MG/1; MG/1
1 TABLET ORAL ONCE
Status: COMPLETED | OUTPATIENT
Start: 2023-08-06 | End: 2023-08-06

## 2023-08-06 RX ORDER — METFORMIN HCL 500 MG
1000 TABLET, EXTENDED RELEASE 24 HR ORAL 2 TIMES DAILY WITH MEALS
Status: DISCONTINUED | OUTPATIENT
Start: 2023-08-06 | End: 2023-08-07

## 2023-08-06 RX ORDER — ONDANSETRON 4 MG/1
4 TABLET, ORALLY DISINTEGRATING ORAL EVERY 6 HOURS PRN
Status: DISCONTINUED | OUTPATIENT
Start: 2023-08-06 | End: 2023-08-07

## 2023-08-06 RX ORDER — LIDOCAINE 40 MG/G
CREAM TOPICAL
Status: DISCONTINUED | OUTPATIENT
Start: 2023-08-06 | End: 2023-08-07

## 2023-08-06 RX ORDER — HYDROMORPHONE HCL IN WATER/PF 6 MG/30 ML
0.2 PATIENT CONTROLLED ANALGESIA SYRINGE INTRAVENOUS
Status: DISCONTINUED | OUTPATIENT
Start: 2023-08-06 | End: 2023-08-06

## 2023-08-06 RX ORDER — DEXTROSE MONOHYDRATE 25 G/50ML
25-50 INJECTION, SOLUTION INTRAVENOUS
Status: DISCONTINUED | OUTPATIENT
Start: 2023-08-06 | End: 2023-08-07

## 2023-08-06 RX ORDER — ATORVASTATIN CALCIUM 40 MG/1
40 TABLET, FILM COATED ORAL AT BEDTIME
Status: DISCONTINUED | OUTPATIENT
Start: 2023-08-06 | End: 2023-08-07

## 2023-08-06 RX ORDER — HYDROMORPHONE HCL IN WATER/PF 6 MG/30 ML
0.2 PATIENT CONTROLLED ANALGESIA SYRINGE INTRAVENOUS
Status: DISCONTINUED | OUTPATIENT
Start: 2023-08-06 | End: 2023-08-07

## 2023-08-06 RX ORDER — NICOTINE POLACRILEX 4 MG
15-30 LOZENGE BUCCAL
Status: DISCONTINUED | OUTPATIENT
Start: 2023-08-06 | End: 2023-08-07

## 2023-08-06 RX ORDER — CITALOPRAM HYDROBROMIDE 10 MG/1
10 TABLET ORAL DAILY
COMMUNITY

## 2023-08-06 RX ORDER — CITALOPRAM HYDROBROMIDE 10 MG/1
10 TABLET ORAL DAILY
Status: DISCONTINUED | OUTPATIENT
Start: 2023-08-06 | End: 2023-08-07

## 2023-08-06 RX ADMIN — ATORVASTATIN CALCIUM 40 MG: 40 TABLET, FILM COATED ORAL at 22:42

## 2023-08-06 RX ADMIN — OXYCODONE HYDROCHLORIDE 2.5 MG: 5 TABLET ORAL at 18:03

## 2023-08-06 RX ADMIN — METFORMIN ER 500 MG 1000 MG: 500 TABLET ORAL at 18:54

## 2023-08-06 RX ADMIN — HYDROCODONE BITARTRATE AND ACETAMINOPHEN 1 TABLET: 5; 325 TABLET ORAL at 13:59

## 2023-08-06 RX ADMIN — ACETAMINOPHEN 975 MG: 325 TABLET ORAL at 18:02

## 2023-08-06 RX ADMIN — OXYCODONE HYDROCHLORIDE 2.5 MG: 5 TABLET ORAL at 22:47

## 2023-08-06 RX ADMIN — CITALOPRAM HYDROBROMIDE 10 MG: 10 TABLET ORAL at 20:39

## 2023-08-06 ASSESSMENT — ACTIVITIES OF DAILY LIVING (ADL)
ADLS_ACUITY_SCORE: 35
DEPENDENT_IADLS:: TRANSPORTATION;MONEY MANAGEMENT;MEDICATION MANAGEMENT;MEAL PREPARATION;SHOPPING;LAUNDRY;COOKING;CLEANING
ADLS_ACUITY_SCORE: 35

## 2023-08-06 NOTE — PLAN OF CARE
Right bimalleolar ankle fracture. Plan for operative fixation tomorrow afternoon with Dr. Amaya. Full consult to follow. NPO at midnight.    Roland Gandhi MD   [facility]  8/6/2023

## 2023-08-06 NOTE — ED TRIAGE NOTES
Pt arrives to ER via ambulance d/t fall in shower and having pain in right ankle. Pt has dementia per family but pt was able to tell nurse that she fell and what happened. Denies hitting head, no loss of conscious and not taking any blood thinners. Pt arrives with right ankle stabilized. CMS intact and pt rates pain at a 10. Pt is alert to situation but disoriented to time, place and person (can tell her name but unable to tell birthday) which is normal per family who is at bedside. VSS. Will continue to monitor.      Triage Assessment       Row Name 08/06/23 1317       Triage Assessment (Adult)    Airway WDL WDL       Respiratory WDL    Respiratory WDL WDL       Peripheral/Neurovascular WDL    Peripheral Neurovascular WDL WDL       Cognitive/Neuro/Behavioral WDL    Cognitive/Neuro/Behavioral WDL X;orientation    Level of Consciousness confused  baseline; hx dementia    Orientation time;place;disoriented to;person

## 2023-08-06 NOTE — ED PROVIDER NOTES
EMERGENCY DEPARTMENT ENCOUNTER      NAME: Trinidad Brown  AGE: 78 year old female  YOB: 1945  MRN: [id number]  EVALUATION DATE & TIME: 8/6/2023  1:11 PM    PCP: Rohini Alvarez    ED PROVIDER: Abel Naqvi M.D.      Chief Complaint   Patient presents with    Fall     Fell in shower and hurt right ankle         FINAL IMPRESSION:  Bimalleolar ankle fracture  Short leg splinting  ED COURSE & MEDICAL DECISION MAKING:    Pertinent Labs & Imaging studies reviewed. (See chart for details)  78 year old female presents to the Emergency Department for evaluation of right ankle pain.  Patient arrives with her granddaughter who provides much of the history.  Per report patient was in the shower with her granddaughter assisting.  Granddaughter left her briefly and she attempted to get out of the shower on her own and slipped and fell.  Denies striking her head.  No loss of consciousness.  Patient was alert and appropriate throughout but complaining of ankle pain.  EMS called the transfer report the patient.  Splinted in place on arrival.  Patient is alert and appropriate and pleasant.  She does have some confusion due to underlying dementia.  Exam is unremarkable.  No chest wall tenderness.  Good range of motion upper extremities.  No normal cephalic/atraumatic.  Neck supple nontender.  Abdomen soft and nontender.  Hips nontender.  Patient with mild diffuse soft tissue swelling of the right ankle with moderate tenderness.  No obvious deformity.  Patient given hydrocodone for discomfort.  Imaging of the ankle and tib-fib area being assessed to assess for fracture.. Patient appears non toxic with stable vitals signs. Overall exam is benign.  Simple mechanical fall no indications for laboratory evaluation or advanced imaging.        1:44 PM I met with the patient for the initial interview and physical examination. Discussed plan for treatment and workup in the ED.    2:50 PM.  Patient with right bimalleolar fracture with  minimal medial displacement tibia over talus.  We will proceed with sugar-tong splinting.  Patient will likely require hospitalization as given her age, obesity and dementia will not be able to master crutches.  Call will be placed to orthopedics.  3:20 PM.  Patient discussed with Dr. Gandhi who is agreeable with plan for admission.  Splinting performed.  Patient tolerated procedure well.  Call placed to the admitting physician.  At the conclusion of the encounter I discussed the results of all of the tests and the disposition. The questions were answered and return precautions provided. The patient or family acknowledged understanding and was agreeable with the care plan.           MEDICATIONS GIVEN IN THE EMERGENCY:  Medications - No data to display    NEW PRESCRIPTIONS STARTED AT TODAY'S ER VISIT  New Prescriptions    No medications on file        Medical Decision Making    History:  Supplemental history from: Documented in chart, if applicable and Family Member/Significant Other  External Record(s) reviewed: Documented in chart, if applicable.    Work Up:  Chart documentation includes differential considered and any EKGs or imaging independently interpreted by provider, where specified.  In additional to work up documented, I considered the following work up:     External consultation:  Discussion of management with another provider:     Complicating factors:  Care impacted by chronic illness: Dementia, diabetes  Care affected by social determinants of health: N/A    Disposition considerations: Admit.    =================================================================    HPI            Trinidad Brown is a 78 year old female with a pertient medical history of mention diabetes who presents to the ED for evaluation of right ankle pain after a fall in the shower.  Patient was in the shower with her granddaughter assisting her.  Patient tried to get out of the shower without assistance twisting her ankle.  She did fall but  did not strike her head.  There is no loss of conscious.  Patient reporting only ankle discomfort.  Unable to ambulate and EMS was called for transfer.  Patient feeling well prior to the fall.  No vomiting or diarrhea to suggest fluid losses.  No focal findings.      REVIEW OF SYSTEMS   Constitutional:  Denies fever, chills  Respiratory:  Denies productive cough or increased work of breathing  Cardiovascular:  Denies chest pain, palpitations  GI:  Denies abdominal pain, nausea, vomiting, or change in bowel or bladder habits   Musculoskeletal:  Denies any new muscle/joint swelling  Skin:  Denies rash   Neurologic:  Denies focal weakness  All systems negative except as marked.     PAST MEDICAL HISTORY:  History reviewed. No pertinent past medical history.    PAST SURGICAL HISTORY:  Past Surgical History:   Procedure Laterality Date    BACK SURGERY      tumor removal    LUMBAR LAMINECTOMY Bilateral 3/29/2019    Procedure: Lumbar 3- Lumbar-4, Lumbar 4-Lumbar -5, Lumbar 5-Sacral 1,  decompressive laminectomy.;  Surgeon: Naeem Thomas MD;  Location: [facility] [place];  Service: Spine       CURRENT MEDICATIONS:    No current facility-administered medications for this encounter.    Current Outpatient Medications:     acetaminophen (TYLENOL) 325 MG tablet, [ACETAMINOPHEN (TYLENOL) 325 MG TABLET] Take 650 mg by mouth every 6 (six) hours as needed for pain., Disp: , Rfl:     alendronate (FOSAMAX) 10 MG tablet, [ALENDRONATE (FOSAMAX) 10 MG TABLET] Take 70 mg by mouth once a week. Take in the morning on an empty stomach with a full glass of water 30 minutes before food, Disp: , Rfl:     atorvastatin (LIPITOR) 40 MG tablet, [ATORVASTATIN (LIPITOR) 40 MG TABLET] Take 40 mg by mouth at bedtime., Disp: , Rfl:     calcium, as carbonate, (TUMS) 200 mg calcium (500 mg) chewable tablet, [CALCIUM, AS CARBONATE, (TUMS) 200 MG CALCIUM (500 MG) CHEWABLE TABLET] Chew 2 tablets daily., Disp: , Rfl:     cholecalciferol, vitamin D3,  "1,000 unit tablet, [CHOLECALCIFEROL, VITAMIN D3, 1,000 UNIT TABLET] Take 2,000 Units by mouth daily.       , Disp: , Rfl:     insulin glargine (LANTUS) 100 unit/mL injection, [INSULIN GLARGINE (LANTUS) 100 UNIT/ML INJECTION] Inject 34 Units under the skin at bedtime., Disp: , Rfl:     metFORMIN (GLUCOPHAGE) 1000 MG tablet, [METFORMIN (GLUCOPHAGE) 1000 MG TABLET] Take 1,000 mg by mouth 2 (two) times a day with meals., Disp: , Rfl:     polyethylene glycol (MIRALAX) 17 gram packet, [POLYETHYLENE GLYCOL (MIRALAX) 17 GRAM PACKET] Take 17 g by mouth daily., Disp: , Rfl:     senna-docusate (PERICOLACE) 8.6-50 mg tablet, [SENNA-DOCUSATE (PERICOLACE) 8.6-50 MG TABLET] Take 1 tablet by mouth daily., Disp: , Rfl:     sitaGLIPtin (JANUVIA) 100 MG tablet, [SITAGLIPTIN (JANUVIA) 100 MG TABLET] Take 100 mg by mouth daily., Disp: , Rfl:     ALLERGIES:  No Known Allergies    FAMILY HISTORY:  Family History   Problem Relation Age of Onset    Cerebrovascular Disease Daughter     Hypertension Daughter     No Known Problems Son        SOCIAL HISTORY:   Social History     Socioeconomic History    Marital status: Single     Spouse name: None    Number of children: None    Years of education: None    Highest education level: None   Tobacco Use    Smoking status: Never    Smokeless tobacco: Never   Substance and Sexual Activity    Alcohol use: No    Drug use: No    Sexual activity: Yes     Birth control/protection: Post-menopausal       VITALS:  Patient Vitals for the past 24 hrs:   BP Temp Temp src Pulse Resp SpO2 Height Weight   08/06/23 1313 (!) 147/71 98.1  F (36.7  C) Oral 83 18 96 % 1.499 m (4' 11\") 88.5 kg (195 lb)        PHYSICAL EXAM    Constitutional:  Awake, alert, in mild apparent distress  HENT:  Normocephalic, Atraumatic. Bilateral external ears normal. Oropharynx moist. Nose normal. Neck- Normal range of motion with no guarding, No midline cervical tenderness, Supple, No stridor.   Eyes:  PERRL, EOMI with no signs of " entrapment, Conjunctiva normal, No discharge.   Respiratory:  Normal breath sounds, No respiratory distress, No wheezing.    Cardiovascular:  Normal heart rate, Normal rhythm, No appreciable rubs or gallops.   GI:  Soft, No tenderness, No distension, No palpable masses  Musculoskeletal:   No edema. Good range of motion in all major joints except for right ankle.  Moderate tenderness to medial and lateral malleolus of right ankle.  No obvious deformity.  Mild soft tissue swelling.    Integument:  Warm, Dry, No erythema, No rash.   Neurologic:  Alert & oriented, Normal motor function, Normal sensory function, No focal deficits noted.   Psychiatric:  Affect normal    ECG:   Normal sinus rhythm occasional PVC.  Normal QRS.  Normal ST segments.  Normal EKG.  Essentially unchanged from March 24, 2019      RADIOLOGY:  Reviewed all pertinent imaging. Please see official radiology report.  Ankle XR, G/E 3 views, right    Result Date: 8/6/2023  EXAM: XR TIBIA AND FIBULA RIGHT 2 VIEWS, XR ANKLE RIGHT G/E 3 VIEWS LOCATION: [facility] DATE: 8/6/2023 INDICATION: Fall, ankle leg pain COMPARISON: None available.     IMPRESSION: Osseous demineralization. Acute mildly comminuted oblique fracture of the distal fibular metadiaphysis with slight apex posterior angulation. Mildly displaced intra-articular medial malleolus fracture. Lateral and anterior subluxation of the talus in relation to the tibial plafond. Mild tricompartmental degenerative arthritis of the knee. Mild degenerative arthritis of the midfoot. Small plantar and Achilles calcaneal enthesophytes. Vascular calcifications. Soft tissue edema about the ankle. NOTE: ABNORMAL REPORT THE DICTATION ABOVE DESCRIBES AN ABNORMALITY FOR WHICH FOLLOW-UP IS NEEDED.      XR Tibia and Fibula Right 2 Views    Result Date: 8/6/2023  EXAM: XR TIBIA AND FIBULA RIGHT 2 VIEWS, XR ANKLE RIGHT G/E 3 VIEWS LOCATION: [facility] DATE: 8/6/2023  INDICATION: Fall, ankle leg pain COMPARISON: None available.     IMPRESSION: Osseous demineralization. Acute mildly comminuted oblique fracture of the distal fibular metadiaphysis with slight apex posterior angulation. Mildly displaced intra-articular medial malleolus fracture. Lateral and anterior subluxation of the talus in relation to the tibial plafond. Mild tricompartmental degenerative arthritis of the knee. Mild degenerative arthritis of the midfoot. Small plantar and Achilles calcaneal enthesophytes. Vascular calcifications. Soft tissue edema about the ankle. NOTE: ABNORMAL REPORT THE DICTATION ABOVE DESCRIBES AN ABNORMALITY FOR WHICH FOLLOW-UP IS NEEDED.         PROCEDURES:  PROCEDURE: Splint Placement   INDICATIONS: right bimalleolar ankle fracture   PROCEDURE PROVIDER: Dr Abel Naqvi   NOTE:  A Stirrup only splint made of Orthoglass was applied to the Right lower extremity by the above provider. As noted in the physical exam, distal CMS was intact prior to placement. The splint was checked and the fit was adjusted to ensure proper positioning after placement. Sensation and circulation, as well as motor function, are unchanged after splint placement and the patient is more comfortable with the splint in place.       I, Girma Valdes, am serving as a scribe to document services personally performed by Abel Naqvi MD, based on my observation and the provider's statements to me. I, Abel Naqvi MD attest that Girma Valdes is acting in a scribe capacity, has observed my performance of the services and has documented them in accordance with my direction.    Abel Naqvi M.D.  Emergency Medicine  Former[facility] EMERGENCY DEPARTMENT       Abel Naqvi MD  08/06/23 1556       Abel Naqvi MD  08/06/23 1554       Abel Naqvi MD  08/06/23 1555

## 2023-08-06 NOTE — ED NOTES
[facility]  Expected date:   Expected time:   Means of arrival:   Comments:  Rm 24 when boarder orders

## 2023-08-06 NOTE — ED NOTES
Bed: [place] [address]  Expected date:   Expected time:   Means of arrival:   Comments:  Cobb- 77yo F Fell in shower, right ankle pain

## 2023-08-06 NOTE — H&P
"[facility]    History and Physical - Hospitalist Service       Date of Admission:  8/6/2023    Assessment & Plan      77 yo F with h/o DM2, spinal stenosis, and dementia who presents after a fall in the shower with an bimalleolar ankle fracture.  ED discussed with ortho who is planning to take her to the OR tomorrow.  Keep NPO p MN, pain control. Will need PT/OT eval after surgery.    Principal Problem:    Bimalleolar fracture of right ankle - as above    Active Problems:    Type 2 diabetes mellitus with hyperglycemia - follow accuchecks and use sliding scale insulin. Usually takes trulicity at home and metformin (awaiting pharmacy to verify home med list) - will just follow with sliding scale insulin as her A1c on above regimen is 6.1.  She will be NPO p MN tonight.      Spinal stenosis    Alzheimer's dementia - will make discharge options more difficult, will need to discuss with family whether they can manage at home    Dyslipidemia - continue statin as at home    Depression - continue celexa as at home       Diet:  diabetic then NPO p Mn  DVT Prophylaxis: Pneumatic Compression Devices  Uribe Catheter: Not present  Lines: None     Cardiac Monitoring: None  Code Status:  full    Clinically Significant Risk Factors Present on Admission                    # Dementia: noted on problem list    # Obesity: Estimated body mass index is 39.39 kg/m  as calculated from the following:    Height as of this encounter: 1.499 m (4' 11\").    Weight as of this encounter: 88.5 kg (195 lb).            Disposition Plan      Expected Discharge Date: 08/08/2023                  Jarek Knight MD  Hospitalist Service  [facility]  Securely message with [employer] (more info)  Text page via [employer] Paging/Directory     ______________________________________________________________________    Chief Complaint   Right ankle pain    History is obtained from the patient's family and chart    History " of Present Illness   79 yo F with h/o DM2, spinal stenosis, and dementia who presents after a fall in the shower with an bimalleolar ankle fracture.  Patient has been in her usual state of health until this morning.  Fell in the shower and has pain in the right ankle.  No pain anywhere else. No other acute recent symptoms.  No cold or flu Sx.  No CP or SOB or cough or N/V/Diarrhea.  Pain is moderately severe and does not radiate anywhere.  No numbness in foot.      Past Medical History    Past Medical History:   Diagnosis Date    Alzheimer's dementia (H) 08/06/2023    Spinal stenosis 03/25/2019    Type 2 diabetes mellitus with hyperglycemia (H) 12/09/2015       Past Surgical History   Past Surgical History:   Procedure Laterality Date    BACK SURGERY      tumor removal    LUMBAR LAMINECTOMY Bilateral 3/29/2019    Procedure: Lumbar 3- Lumbar-4, Lumbar 4-Lumbar -5, Lumbar 5-Sacral 1,  decompressive laminectomy.;  Surgeon: Naeem Thomas MD;  Location: [facility] [place];  Service: Spine       Prior to Admission Medications   Prior to Admission Medications   Prescriptions Last Dose Informant Patient Reported? Taking?   atorvastatin (LIPITOR) 40 MG tablet 8/5/2023  Yes Yes   Sig: [ATORVASTATIN (LIPITOR) 40 MG TABLET] Take 40 mg by mouth at bedtime.   citalopram (CELEXA) 10 MG tablet 8/5/2023  Yes Yes   Sig: Take 10 mg by mouth daily   dulaglutide (TRULICITY) 0.75 MG/0.5ML pen Past Week  Yes Yes   Sig: Inject 0.75 mg Subcutaneous every 7 days   metFORMIN (GLUCOPHAGE) 1000 MG tablet 8/5/2023  Yes Yes   Sig: [METFORMIN (GLUCOPHAGE) 1000 MG TABLET] Take 1,000 mg by mouth 2 (two) times a day with meals.      Facility-Administered Medications: None        Review of Systems    The 10 point Review of Systems is negative other than noted in the HPI     Social History   I have reviewed this patient's social history and updated it with pertinent information if needed.  Social History     Tobacco Use    Smoking status: Never     Smokeless tobacco: Never   Substance Use Topics    Alcohol use: No    Drug use: No         Family History   I have reviewed this patient's family history and updated it with pertinent information if needed.  Family History   Problem Relation Age of Onset    Cerebrovascular Disease Daughter     Hypertension Daughter     No Known Problems Son         Physical Exam   Vital Signs: Temp: 98.1  F (36.7  C) Temp src: Oral BP: 117/73 Pulse: 85   Resp: 20 SpO2: 96 % O2 Device: None (Room air)    Weight: 195 lbs 0 oz    General Appearance:    Elderly F in NA   Head:    Normocephalic, without obvious abnormality, atraumatic   Eyes:    PERRL, conjunctiva/corneas clear, EOM's intact,both eyes    Ears:    Normal external ear canals no drainage or erythema bilat.   Nose:   Nares normal by gross inspection,  mucosa normal, no drainage or sinus tenderness   Throat:   Lips, mucosa, and tongue normal; teeth and gums normal   Neck:   Supple, symmetrical, trachea midline, no adenopathy;        thyroid:  No enlargement/tenderness/nodules   Back:     Symmetric, no curvature, ROM normal, no CVA tenderness   Lungs:     CTA   Chest wall:    No tenderness or deformity   Heart:    Regular rate and rhythm, S1 and S2 normal, I/VI systolic murmur, no rubs, no JVD, no edema   Abdomen:     Soft, non-tender, bowel sounds active all four quadrants,     no masses, no hepatosplenomegaly   Musculoskeletal:   Right lower leg/ankle is splinted and wrapped. Otherwise Extremities are warm and non-tender, atraumatic, no joint swelling or tenderness   Pulses:   2+ and symmetric all extremities except unable to assess RLE pulses below the knee   Skin:   Skin color, texture, turgor normal, no rashes or lesions on exposed areas, please see nursing assessment for full skin assessment   Neurologic:        Psychiatric:   Alert, pleasantly confused, CN 2-12 intact, motor 5/5 upper and lower ext symm limited by fracture in right ankle but able to move right toes  and lift right leg.  Sensory grossly intact to light touch.    Affect is calm and normal         Medical Decision Making       75 MINUTES SPENT BY ME on the date of service doing chart review, history, exam, documentation & further activities per the note.      Data     I have personally reviewed the following data over the past 24 hrs:    6.8  \   17.1 (H)   / 153     N/A N/A N/A /  N/A   N/A N/A N/A \     INR:  1.07 PTT:  N/A   D-dimer:  N/A Fibrinogen:  N/A       Imaging results reviewed over the past 24 hrs:   Recent Results (from the past 24 hour(s))   XR Tibia and Fibula Right 2 Views    Narrative    EXAM: XR TIBIA AND FIBULA RIGHT 2 VIEWS, XR ANKLE RIGHT G/E 3 VIEWS  LOCATION: [facility]  DATE: 8/6/2023    INDICATION: Fall, ankle leg pain  COMPARISON: None available.      Impression    IMPRESSION: Osseous demineralization. Acute mildly comminuted oblique fracture of the distal fibular metadiaphysis with slight apex posterior angulation. Mildly displaced intra-articular medial malleolus fracture. Lateral and anterior subluxation of the   talus in relation to the tibial plafond.    Mild tricompartmental degenerative arthritis of the knee. Mild degenerative arthritis of the midfoot. Small plantar and Achilles calcaneal enthesophytes.    Vascular calcifications.     Soft tissue edema about the ankle.     NOTE: ABNORMAL REPORT    THE DICTATION ABOVE DESCRIBES AN ABNORMALITY FOR WHICH FOLLOW-UP IS NEEDED.     Ankle XR, G/E 3 views, right    Narrative    EXAM: XR TIBIA AND FIBULA RIGHT 2 VIEWS, XR ANKLE RIGHT G/E 3 VIEWS  LOCATION: [facility]  DATE: 8/6/2023    INDICATION: Fall, ankle leg pain  COMPARISON: None available.      Impression    IMPRESSION: Osseous demineralization. Acute mildly comminuted oblique fracture of the distal fibular metadiaphysis with slight apex posterior angulation. Mildly displaced intra-articular medial malleolus fracture. Lateral and  anterior subluxation of the   talus in relation to the tibial plafond.    Mild tricompartmental degenerative arthritis of the knee. Mild degenerative arthritis of the midfoot. Small plantar and Achilles calcaneal enthesophytes.    Vascular calcifications.     Soft tissue edema about the ankle.     NOTE: ABNORMAL REPORT    THE DICTATION ABOVE DESCRIBES AN ABNORMALITY FOR WHICH FOLLOW-UP IS NEEDED.     XR Chest Port 1 View    Narrative    EXAM: XR CHEST PORT 1 VIEW  LOCATION: [facility]  DATE: 8/6/2023    INDICATION: Hospital admission for an ankle fracture.  COMPARISON: 05/10/2017      Impression    IMPRESSION: Cardiomediastinal silhouette is normal. Normal vasculature. Lungs and pleural spaces are clear.

## 2023-08-06 NOTE — PHARMACY-ADMISSION MEDICATION HISTORY
Pharmacist Admission Medication History    Admission medication history is complete. The information provided in this note is only as accurate as the sources available at the time of the update.    Medication reconciliation/reorder completed by provider prior to medication history? No    Information Source(s): Family member and Prescription bottles via in-person    Pertinent Information: None    Changes made to PTA medication list:  Added: Citalopram, Trulicity  Deleted: Miralax, senna-docusate, Januvia, Lantus, D3, calcium, alendronate  Changed: None    Medication Affordability:  Not including over the counter (OTC) medications, was there a time in the past 3 months when you did not take your medications as prescribed because of cost?: No    Allergies reviewed with patient and updates made in EHR: yes    Medication History Completed By: Carlotta Milan [facility] 8/6/2023 6:05 PM    Prior to Admission medications    Medication Sig Last Dose Taking? Auth Provider Long Term End Date   atorvastatin (LIPITOR) 40 MG tablet [ATORVASTATIN (LIPITOR) 40 MG TABLET] Take 40 mg by mouth at bedtime. 8/5/2023 Yes Provider, Historical Yes    citalopram (CELEXA) 10 MG tablet Take 10 mg by mouth daily 8/5/2023 Yes Unknown, Entered By History Yes    dulaglutide (TRULICITY) 0.75 MG/0.5ML pen Inject 0.75 mg Subcutaneous every 7 days Unknown Yes Unknown, Entered By History     metFORMIN (GLUCOPHAGE) 1000 MG tablet [METFORMIN (GLUCOPHAGE) 1000 MG TABLET] Take 1,000 mg by mouth 2 (two) times a day with meals. 8/5/2023 Yes Provider, Historical

## 2023-08-07 ENCOUNTER — APPOINTMENT (OUTPATIENT)
Dept: INTERPRETER SERVICES | Facility: CLINIC | Age: 78
End: 2023-08-07
Payer: COMMERCIAL

## 2023-08-07 ENCOUNTER — APPOINTMENT (OUTPATIENT)
Dept: RADIOLOGY | Facility: HOSPITAL | Age: 78
DRG: 493 | End: 2023-08-07
Attending: STUDENT IN AN ORGANIZED HEALTH CARE EDUCATION/TRAINING PROGRAM
Payer: COMMERCIAL

## 2023-08-07 ENCOUNTER — ANESTHESIA EVENT (OUTPATIENT)
Dept: SURGERY | Facility: HOSPITAL | Age: 78
DRG: 493 | End: 2023-08-07
Payer: COMMERCIAL

## 2023-08-07 ENCOUNTER — APPOINTMENT (OUTPATIENT)
Dept: CT IMAGING | Facility: HOSPITAL | Age: 78
DRG: 493 | End: 2023-08-07
Attending: STUDENT IN AN ORGANIZED HEALTH CARE EDUCATION/TRAINING PROGRAM
Payer: COMMERCIAL

## 2023-08-07 ENCOUNTER — ANESTHESIA (OUTPATIENT)
Dept: SURGERY | Facility: HOSPITAL | Age: 78
DRG: 493 | End: 2023-08-07
Payer: COMMERCIAL

## 2023-08-07 LAB
GLUCOSE BLDC GLUCOMTR-MCNC: 112 MG/DL (ref 70–99)
GLUCOSE BLDC GLUCOMTR-MCNC: 132 MG/DL (ref 70–99)

## 2023-08-07 PROCEDURE — 360N000084 HC SURGERY LEVEL 4 W/ FLUORO, PER MIN: Performed by: STUDENT IN AN ORGANIZED HEALTH CARE EDUCATION/TRAINING PROGRAM

## 2023-08-07 PROCEDURE — 250N000011 HC RX IP 250 OP 636: Performed by: ORTHOPAEDIC SURGERY

## 2023-08-07 PROCEDURE — 250N000009 HC RX 250: Performed by: STUDENT IN AN ORGANIZED HEALTH CARE EDUCATION/TRAINING PROGRAM

## 2023-08-07 PROCEDURE — 250N000011 HC RX IP 250 OP 636: Mod: JZ | Performed by: STUDENT IN AN ORGANIZED HEALTH CARE EDUCATION/TRAINING PROGRAM

## 2023-08-07 PROCEDURE — 99232 SBSQ HOSP IP/OBS MODERATE 35: CPT | Performed by: INTERNAL MEDICINE

## 2023-08-07 PROCEDURE — C1769 GUIDE WIRE: HCPCS | Performed by: STUDENT IN AN ORGANIZED HEALTH CARE EDUCATION/TRAINING PROGRAM

## 2023-08-07 PROCEDURE — 999N000180 XR SURGERY CARM FLUORO LESS THAN 5 MIN

## 2023-08-07 PROCEDURE — 250N000011 HC RX IP 250 OP 636: Performed by: ANESTHESIOLOGY

## 2023-08-07 PROCEDURE — 0QSG04Z REPOSITION RIGHT TIBIA WITH INTERNAL FIXATION DEVICE, OPEN APPROACH: ICD-10-PCS | Performed by: STUDENT IN AN ORGANIZED HEALTH CARE EDUCATION/TRAINING PROGRAM

## 2023-08-07 PROCEDURE — 250N000011 HC RX IP 250 OP 636: Mod: JZ | Performed by: INTERNAL MEDICINE

## 2023-08-07 PROCEDURE — 258N000003 HC RX IP 258 OP 636: Performed by: ANESTHESIOLOGY

## 2023-08-07 PROCEDURE — 0QSJ04Z REPOSITION RIGHT FIBULA WITH INTERNAL FIXATION DEVICE, OPEN APPROACH: ICD-10-PCS | Performed by: STUDENT IN AN ORGANIZED HEALTH CARE EDUCATION/TRAINING PROGRAM

## 2023-08-07 PROCEDURE — 272N000001 HC OR GENERAL SUPPLY STERILE: Performed by: STUDENT IN AN ORGANIZED HEALTH CARE EDUCATION/TRAINING PROGRAM

## 2023-08-07 PROCEDURE — 0SSF04Z REPOSITION RIGHT ANKLE JOINT WITH INTERNAL FIXATION DEVICE, OPEN APPROACH: ICD-10-PCS | Performed by: STUDENT IN AN ORGANIZED HEALTH CARE EDUCATION/TRAINING PROGRAM

## 2023-08-07 PROCEDURE — 250N000013 HC RX MED GY IP 250 OP 250 PS 637: Performed by: STUDENT IN AN ORGANIZED HEALTH CARE EDUCATION/TRAINING PROGRAM

## 2023-08-07 PROCEDURE — 120N000001 HC R&B MED SURG/OB

## 2023-08-07 PROCEDURE — 250N000013 HC RX MED GY IP 250 OP 250 PS 637: Performed by: INTERNAL MEDICINE

## 2023-08-07 PROCEDURE — 999N000141 HC STATISTIC PRE-PROCEDURE NURSING ASSESSMENT: Performed by: STUDENT IN AN ORGANIZED HEALTH CARE EDUCATION/TRAINING PROGRAM

## 2023-08-07 PROCEDURE — 250N000011 HC RX IP 250 OP 636: Mod: JZ | Performed by: ANESTHESIOLOGY

## 2023-08-07 PROCEDURE — 370N000017 HC ANESTHESIA TECHNICAL FEE, PER MIN: Performed by: STUDENT IN AN ORGANIZED HEALTH CARE EDUCATION/TRAINING PROGRAM

## 2023-08-07 PROCEDURE — C1713 ANCHOR/SCREW BN/BN,TIS/BN: HCPCS | Performed by: STUDENT IN AN ORGANIZED HEALTH CARE EDUCATION/TRAINING PROGRAM

## 2023-08-07 PROCEDURE — 73700 CT LOWER EXTREMITY W/O DYE: CPT | Mod: RT

## 2023-08-07 DEVICE — IMPLANTABLE DEVICE: Type: IMPLANTABLE DEVICE | Site: ANKLE | Status: FUNCTIONAL

## 2023-08-07 DEVICE — IMP SCREW BONE KREULOCK 3.5X16MM AR-8835CL-16: Type: IMPLANTABLE DEVICE | Site: ANKLE | Status: FUNCTIONAL

## 2023-08-07 DEVICE — IMP PLATE ARTHREX LOCKING 1/3 TUBULAR 10H SS AR-8943T-10: Type: IMPLANTABLE DEVICE | Site: ANKLE | Status: FUNCTIONAL

## 2023-08-07 RX ORDER — MEPERIDINE HYDROCHLORIDE 25 MG/ML
12.5 INJECTION INTRAMUSCULAR; INTRAVENOUS; SUBCUTANEOUS EVERY 5 MIN PRN
Status: DISCONTINUED | OUTPATIENT
Start: 2023-08-07 | End: 2023-08-07 | Stop reason: HOSPADM

## 2023-08-07 RX ORDER — FENTANYL CITRATE 50 UG/ML
50 INJECTION, SOLUTION INTRAMUSCULAR; INTRAVENOUS EVERY 5 MIN PRN
Status: DISCONTINUED | OUTPATIENT
Start: 2023-08-07 | End: 2023-08-07 | Stop reason: HOSPADM

## 2023-08-07 RX ORDER — CEFTRIAXONE 2 G/1
2 INJECTION, POWDER, FOR SOLUTION INTRAMUSCULAR; INTRAVENOUS EVERY 24 HOURS
Status: DISCONTINUED | OUTPATIENT
Start: 2023-08-07 | End: 2023-08-07

## 2023-08-07 RX ORDER — NALOXONE HYDROCHLORIDE 0.4 MG/ML
0.4 INJECTION, SOLUTION INTRAMUSCULAR; INTRAVENOUS; SUBCUTANEOUS
Status: DISCONTINUED | OUTPATIENT
Start: 2023-08-07 | End: 2023-08-07

## 2023-08-07 RX ORDER — POLYETHYLENE GLYCOL 3350 17 G/17G
17 POWDER, FOR SOLUTION ORAL DAILY
Status: DISCONTINUED | OUTPATIENT
Start: 2023-08-08 | End: 2023-08-09 | Stop reason: HOSPADM

## 2023-08-07 RX ORDER — FENTANYL CITRATE 50 UG/ML
25-100 INJECTION, SOLUTION INTRAMUSCULAR; INTRAVENOUS
Status: DISCONTINUED | OUTPATIENT
Start: 2023-08-07 | End: 2023-08-07 | Stop reason: HOSPADM

## 2023-08-07 RX ORDER — ONDANSETRON 4 MG/1
4 TABLET, ORALLY DISINTEGRATING ORAL EVERY 30 MIN PRN
Status: DISCONTINUED | OUTPATIENT
Start: 2023-08-07 | End: 2023-08-07 | Stop reason: HOSPADM

## 2023-08-07 RX ORDER — CEFAZOLIN SODIUM 2 G/100ML
2 INJECTION, SOLUTION INTRAVENOUS EVERY 8 HOURS
Status: COMPLETED | OUTPATIENT
Start: 2023-08-07 | End: 2023-08-08

## 2023-08-07 RX ORDER — MAGNESIUM HYDROXIDE 1200 MG/15ML
LIQUID ORAL PRN
Status: DISCONTINUED | OUTPATIENT
Start: 2023-08-07 | End: 2023-08-07 | Stop reason: HOSPADM

## 2023-08-07 RX ORDER — SODIUM CHLORIDE, SODIUM LACTATE, POTASSIUM CHLORIDE, CALCIUM CHLORIDE 600; 310; 30; 20 MG/100ML; MG/100ML; MG/100ML; MG/100ML
INJECTION, SOLUTION INTRAVENOUS CONTINUOUS
Status: DISCONTINUED | OUTPATIENT
Start: 2023-08-07 | End: 2023-08-07 | Stop reason: HOSPADM

## 2023-08-07 RX ORDER — ENOXAPARIN SODIUM 100 MG/ML
40 INJECTION SUBCUTANEOUS EVERY 24 HOURS
Status: DISCONTINUED | OUTPATIENT
Start: 2023-08-08 | End: 2023-08-09 | Stop reason: HOSPADM

## 2023-08-07 RX ORDER — NALOXONE HYDROCHLORIDE 0.4 MG/ML
0.2 INJECTION, SOLUTION INTRAMUSCULAR; INTRAVENOUS; SUBCUTANEOUS
Status: DISCONTINUED | OUTPATIENT
Start: 2023-08-07 | End: 2023-08-07

## 2023-08-07 RX ORDER — ASPIRIN 325 MG
325 TABLET, DELAYED RELEASE (ENTERIC COATED) ORAL DAILY
Status: DISCONTINUED | OUTPATIENT
Start: 2023-08-07 | End: 2023-08-09 | Stop reason: HOSPADM

## 2023-08-07 RX ORDER — PROPOFOL 10 MG/ML
INJECTION, EMULSION INTRAVENOUS PRN
Status: DISCONTINUED | OUTPATIENT
Start: 2023-08-07 | End: 2023-08-07

## 2023-08-07 RX ORDER — ONDANSETRON 2 MG/ML
4 INJECTION INTRAMUSCULAR; INTRAVENOUS EVERY 30 MIN PRN
Status: DISCONTINUED | OUTPATIENT
Start: 2023-08-07 | End: 2023-08-07 | Stop reason: HOSPADM

## 2023-08-07 RX ORDER — LIDOCAINE 40 MG/G
CREAM TOPICAL
Status: DISCONTINUED | OUTPATIENT
Start: 2023-08-07 | End: 2023-08-09 | Stop reason: HOSPADM

## 2023-08-07 RX ORDER — ROPIVACAINE HYDROCHLORIDE 2 MG/ML
INJECTION, SOLUTION EPIDURAL; INFILTRATION; PERINEURAL
Status: COMPLETED | OUTPATIENT
Start: 2023-08-07 | End: 2023-08-07

## 2023-08-07 RX ORDER — CEFAZOLIN SODIUM/WATER 2 G/20 ML
2 SYRINGE (ML) INTRAVENOUS SEE ADMIN INSTRUCTIONS
Status: DISCONTINUED | OUTPATIENT
Start: 2023-08-07 | End: 2023-08-07 | Stop reason: HOSPADM

## 2023-08-07 RX ORDER — PROCHLORPERAZINE MALEATE 5 MG
5 TABLET ORAL EVERY 6 HOURS PRN
Status: DISCONTINUED | OUTPATIENT
Start: 2023-08-07 | End: 2023-08-09 | Stop reason: HOSPADM

## 2023-08-07 RX ORDER — ONDANSETRON 2 MG/ML
4 INJECTION INTRAMUSCULAR; INTRAVENOUS EVERY 6 HOURS PRN
Status: DISCONTINUED | OUTPATIENT
Start: 2023-08-07 | End: 2023-08-09 | Stop reason: HOSPADM

## 2023-08-07 RX ORDER — LIDOCAINE 40 MG/G
CREAM TOPICAL
Status: DISCONTINUED | OUTPATIENT
Start: 2023-08-07 | End: 2023-08-07 | Stop reason: HOSPADM

## 2023-08-07 RX ORDER — PROPOFOL 10 MG/ML
INJECTION, EMULSION INTRAVENOUS CONTINUOUS PRN
Status: DISCONTINUED | OUTPATIENT
Start: 2023-08-07 | End: 2023-08-07

## 2023-08-07 RX ORDER — FENTANYL CITRATE 50 UG/ML
25 INJECTION, SOLUTION INTRAMUSCULAR; INTRAVENOUS EVERY 5 MIN PRN
Status: DISCONTINUED | OUTPATIENT
Start: 2023-08-07 | End: 2023-08-07 | Stop reason: HOSPADM

## 2023-08-07 RX ORDER — BISACODYL 10 MG
10 SUPPOSITORY, RECTAL RECTAL DAILY PRN
Status: DISCONTINUED | OUTPATIENT
Start: 2023-08-07 | End: 2023-08-09 | Stop reason: HOSPADM

## 2023-08-07 RX ORDER — ONDANSETRON 4 MG/1
4 TABLET, ORALLY DISINTEGRATING ORAL EVERY 6 HOURS PRN
Status: DISCONTINUED | OUTPATIENT
Start: 2023-08-07 | End: 2023-08-09 | Stop reason: HOSPADM

## 2023-08-07 RX ORDER — CEFAZOLIN SODIUM/WATER 2 G/20 ML
2 SYRINGE (ML) INTRAVENOUS
Status: COMPLETED | OUTPATIENT
Start: 2023-08-07 | End: 2023-08-07

## 2023-08-07 RX ORDER — AMOXICILLIN 250 MG
1 CAPSULE ORAL 2 TIMES DAILY
Status: DISCONTINUED | OUTPATIENT
Start: 2023-08-07 | End: 2023-08-09 | Stop reason: HOSPADM

## 2023-08-07 RX ORDER — ONDANSETRON 2 MG/ML
INJECTION INTRAMUSCULAR; INTRAVENOUS PRN
Status: DISCONTINUED | OUTPATIENT
Start: 2023-08-07 | End: 2023-08-07

## 2023-08-07 RX ADMIN — OXYCODONE HYDROCHLORIDE 2.5 MG: 5 TABLET ORAL at 08:49

## 2023-08-07 RX ADMIN — SODIUM CHLORIDE, POTASSIUM CHLORIDE, SODIUM LACTATE AND CALCIUM CHLORIDE: 600; 310; 30; 20 INJECTION, SOLUTION INTRAVENOUS at 12:00

## 2023-08-07 RX ADMIN — ONDANSETRON 4 MG: 2 INJECTION INTRAMUSCULAR; INTRAVENOUS at 00:33

## 2023-08-07 RX ADMIN — ACETAMINOPHEN 975 MG: 325 TABLET ORAL at 02:24

## 2023-08-07 RX ADMIN — CEFAZOLIN SODIUM 2 G: 2 INJECTION, SOLUTION INTRAVENOUS at 21:08

## 2023-08-07 RX ADMIN — Medication 2 G: at 13:00

## 2023-08-07 RX ADMIN — ROPIVACAINE HYDROCHLORIDE 25 ML: 5 INJECTION, SOLUTION EPIDURAL; INFILTRATION; PERINEURAL at 12:19

## 2023-08-07 RX ADMIN — ASPIRIN 325 MG: 325 TABLET, COATED ORAL at 18:10

## 2023-08-07 RX ADMIN — ACETAMINOPHEN 975 MG: 325 TABLET ORAL at 10:04

## 2023-08-07 RX ADMIN — METFORMIN ER 500 MG 1000 MG: 500 TABLET ORAL at 08:52

## 2023-08-07 RX ADMIN — PHENYLEPHRINE HYDROCHLORIDE 100 MCG: 10 INJECTION INTRAVENOUS at 13:14

## 2023-08-07 RX ADMIN — PHENYLEPHRINE HYDROCHLORIDE 50 MCG: 10 INJECTION INTRAVENOUS at 14:03

## 2023-08-07 RX ADMIN — ONDANSETRON 4 MG: 2 INJECTION INTRAMUSCULAR; INTRAVENOUS at 14:33

## 2023-08-07 RX ADMIN — SENNOSIDES AND DOCUSATE SODIUM 1 TABLET: 50; 8.6 TABLET ORAL at 21:08

## 2023-08-07 RX ADMIN — CEFTRIAXONE SODIUM 2 G: 2 INJECTION, POWDER, FOR SOLUTION INTRAMUSCULAR; INTRAVENOUS at 06:58

## 2023-08-07 RX ADMIN — ROPIVACAINE HYDROCHLORIDE 15 ML: 2 INJECTION, SOLUTION EPIDURAL; INFILTRATION at 12:17

## 2023-08-07 RX ADMIN — PROPOFOL 75 MCG/KG/MIN: 10 INJECTION, EMULSION INTRAVENOUS at 13:00

## 2023-08-07 RX ADMIN — FENTANYL CITRATE 100 MCG: 50 INJECTION, SOLUTION INTRAMUSCULAR; INTRAVENOUS at 12:20

## 2023-08-07 RX ADMIN — PROPOFOL 20 MG: 10 INJECTION, EMULSION INTRAVENOUS at 14:47

## 2023-08-07 ASSESSMENT — ACTIVITIES OF DAILY LIVING (ADL)
ADLS_ACUITY_SCORE: 37
ADLS_ACUITY_SCORE: 47
ADLS_ACUITY_SCORE: 37
ADLS_ACUITY_SCORE: 37
ADLS_ACUITY_SCORE: 41
ADLS_ACUITY_SCORE: 41
ADLS_ACUITY_SCORE: 47
ADLS_ACUITY_SCORE: 37
ADLS_ACUITY_SCORE: 37
ADLS_ACUITY_SCORE: 47
ADLS_ACUITY_SCORE: 37
ADLS_ACUITY_SCORE: 41

## 2023-08-07 NOTE — CONSULTS
ORTHOPEDIC CONSULTATION    Consultation  EMILIANO Santana 1945, MRN [id number]    Bimalleolar fracture [S82.845B]  Closed bimalleolar fracture, unspecified laterality, initial encounter [J80.197C]    PCP: Sarah Quijano, [phone]   Code status:  Full Code       Extended Emergency Contact Information  Primary Emergency Contact: Armen Alvarez   [place]  Mobile Phone: [phone]  Relation: Daughter  Secondary Emergency Contact: Kendra Brown   [place]  Mobile Phone: [phone]  Relation: Son-in-Law         IMPRESSION:  Right bimalleolar ankle fracture, DOI 23     PLAN:  This patient was discussed with Dr. Gandhi, on-call surgeon for [facility] and they are in agreement with the following plan.   - Plan for surgical fixation today with Dr. Amaya.  Prior to surgery she will need XR and CT foot, orders placed.  - Continue NPO  - NWB - splint on at all times  - Continue current pain regimen  - Hold anticoagulation      Thank you for including [facility] in the care of Trinidad Brown. It has been a pleasure participating in their care.        CHIEF COMPLAINT: Bimalleolar fracture    HISTORY OF PRESENT ILLNESS:  The patient is seen in orthopedic consultation at the request of Jarek Knight MD for right ankle fracture.  The patient is a 78 year old female with PMH significant for DMII, spinal stenosis.    Today patient is experiencing right ankle pain.  I used an  but patient confused and did not provide any information or answer questions with .  I did speak with daughter over the phone who reports a fall yesterday in the shower.  Denies any history of ankle or foot injuries.  She does note that at baseline the patient is NWB and uses a wheelchair for mobility.  This has been the case since a spinal surgery in 2018.      ALLERGIES:   Review of patient's allergies indicates No Known Allergies      MEDICATIONS UPON ADMISSION:  Medications were reviewed.  They include:  "  Medications Prior to Admission   Medication Sig Dispense Refill Last Dose    atorvastatin (LIPITOR) 40 MG tablet [ATORVASTATIN (LIPITOR) 40 MG TABLET] Take 40 mg by mouth at bedtime.   8/5/2023    citalopram (CELEXA) 10 MG tablet Take 10 mg by mouth daily   8/5/2023    dulaglutide (TRULICITY) 0.75 MG/0.5ML pen Inject 0.75 mg Subcutaneous every 7 days   Past Week    metFORMIN (GLUCOPHAGE) 1000 MG tablet [METFORMIN (GLUCOPHAGE) 1000 MG TABLET] Take 1,000 mg by mouth 2 (two) times a day with meals.   8/5/2023         SOCIAL HISTORY:   she  reports that she has never smoked. She has never used smokeless tobacco. She reports that she does not drink alcohol and does not use drugs.      FAMILY HISTORY:  family history includes Cerebrovascular Disease in her daughter; Hypertension in her daughter; No Known Problems in her son.      REVIEW OF SYSTEMS:   Reviewed with patient. See HPI, otherwise negative       PHYSICAL EXAMINATION:  Vitals: /62 (BP Location: Left arm)   Pulse 80   Temp 97.8  F (36.6  C) (Oral)   Resp 18   Ht 1.499 m (4' 11\")   Wt 88.5 kg (195 lb)   SpO2 94%   BMI 39.39 kg/m    General: On examination, the patient is resting comfortably, NAD, awake, and alert and not oriented x3   SKIN: There is no evidence of erythema, warmth, swelling, deformity, crepitus, break to the skin, open wound.  Splint intact  Pulses:   Unable to assess d/t splint    Sensation:  Intact to the toes    Tenderness: Unable to assess with splint  ROM: Wiggles toes    Contralateral side= Full range of motion, Negative joint instability findings, 5/5 motor groups about the joint, Non-tender.       RADIOGRAPHIC EVALUATION:  Personally reviewed  Narrative & Impression   EXAM: XR TIBIA AND FIBULA RIGHT 2 VIEWS, XR ANKLE RIGHT G/E 3 VIEWS  LOCATION: [facility]  DATE: 8/6/2023     INDICATION: Fall, ankle leg pain  COMPARISON: None available.                                                                  "     IMPRESSION: Osseous demineralization. Acute mildly comminuted oblique fracture of the distal fibular metadiaphysis with slight apex posterior angulation. Mildly displaced intra-articular medial malleolus fracture. Lateral and anterior subluxation of the   talus in relation to the tibial plafond.     Mild tricompartmental degenerative arthritis of the knee. Mild degenerative arthritis of the midfoot. Small plantar and Achilles calcaneal enthesophytes.     Vascular calcifications.      Soft tissue edema about the ankle.          Narrative & Impression   EXAM: CT ANKLE RIGHT W/O CONTRAST  LOCATION: [facility]  DATE: 8/6/2023     INDICATION: right ankle fracture. CT for surgical planning.  COMPARISON: Radiographs from 08/06/2023.  TECHNIQUE: Noncontrast. Axial, sagittal and coronal thin-section reconstruction. Dose reduction techniques were used.      FINDINGS:      BONES:  -Comminuted fracture in the distal shaft of the fibula proximal to the distal tibiofibular syndesmosis. There is 4 mm of displacement. No angulation.     There is widening of the distal tibiofibular syndesmosis and there are tiny mildly displaced fractures between the distal tibia and fibula at the syndesmosis indicating tiny syndesmotic ligament avulsion fractures.     There is a posterior malleolar fracture. This is 5 mm AP by 12 mm transverse by 14 mm cephalocaudal and is displaced 2 mm. A 5 x 5 mm portion of the adjacent tibial plafond is impacted 2 mm.     There is a fracture at the base of the medial malleolus. This is displaced 5 mm.     The talus is moderately subluxed distally and laterally from the tibial plafond.     Partially included in the field-of-view is a fracture of the distal lateral aspect of the medial cuneiform and there is mild widening of the first and fourth TMT joints suggesting Lisfranc subluxation.     SOFT TISSUES:  -Edema in the soft tissues diffusely. No hematoma, cyst or mass. Diffuse  muscle atrophy. Extensive arterial calcifications.                                                                      IMPRESSION:  1.  Distal shaft fibular fracture proximal to the distal tibiofibular syndesmosis. There is widening of the distal tibiofibular syndesmosis and there are tiny mildly displaced syndesmotic ligament avulsion fractures from the distal tibia and fibula.  2.  Posterior malleolar fracture.  3.  Medial malleolar fracture.  4.  Subluxation of the talus.  5.  Partially included in the field-of-view is a Lisfranc injury, with subluxation at the first and fourth TMT joints and intra-articular fracture of the distal end of the medial cuneiform. Dedicated foot CT could further evaluate.  6.  Extensive arterial calcifications.  7.  Diffuse muscle atrophy.           PERTINENT LABS:  Personally reviewed  Recent Labs   Lab Test 08/06/23  1552   INR 1.07   HGB 17.1*            DONALD TAVERA PA-C  Date: 8/7/2023  Time: 9:12 AM  [facility]    CC1:   Jarek Knight MD    Attending addendum:     I saw and evaluated the patient today.  This is a 78-year-old mung speaking female with dementia and diabetes mellitus type 2.  Her daughter and granddaughter give the history.  She has numbness in both of her legs from a spinal surgery many years ago.  She lives with her daughter, and does take a few steps around the house but is mostly wheelchair-bound for ambulation.  She had a fall while getting out of the shower yesterday and had deformity and pain to her right ankle.  She was brought to the emergency department and diagnosed with a ankle fracture.  She was placed into a splint.  CT scan subsequently showed a foot fracture as well.  She has persistent subluxation of her ankle, and was cleared by the hospitalist for surgery.    Physical examination:  General: Pleasant and alert 78-year-old female, no apparent distress  Respiratory: Breathing unlabored on room air  Right lower extremity:  Short leg splint in place.  This is partially split, and the patient has swelling and bruising to her right ankle and foot.  There is appropriate wrinkle sign.  Visualized skin is intact.  She is able to wiggle her toes, but otherwise does not participate in a motor or sensory exam secondary to her mental status.  She does have a palpable DP pulse, and her toes are warm and well-perfused with brisk capillary refill.    Imaging: AP and lateral of the right tib-fib, 3 views of the right ankle, a CT of the right ankle and a CT of the right foot are all personally reviewed.  Vascular calcifications are present.  There is a trimalleolar fracture dislocation of the right ankle.  There is an oblique Parks C distal fibula fracture associated with a medial malleolar fracture with lateral subluxation of the talus.  There is a small posterior malleolar fracture without significant displacement.  Significant vascular calcifications are noted.    There is an intra-articular fracture of the medial cuneiform, and fractures through the bases of the third and fourth metatarsals.  Minimal displacement is noted.      Assessment: 78-year-old female primarily wheelchair-bound with bilateral lower extremity neuropathy and dementia with a right trimalleolar ankle fracture dislocation, and a right foot fairly nondisplaced bony Lisfranc injury.    Recommendations: I had a good discussion with the patient's daughter and granddaughter today.  Her ankle injury is extremely unstable, and I think nonoperative treatment would likely lead to soft tissue issues, potentially ulcerations and infection, nonunion, and at the least malunion.  We discussed the need to stabilize her ankle via open reduction internal fixation.  We discussed the risk, benefits, and alternatives to this procedure.  After reviewing her CT scan, and given the patient's functional demands my recommendation for her midfoot would be nonoperative treatment.  We will examine her  midfoot under fluoroscopy under anesthesia to assess the stability.  The patient has been cleared by the hospitalist service for surgery.  We will proceed this afternoon.  Her daughter and granddaughter were in agreement.    Jatin Amaya MD  [facility]

## 2023-08-07 NOTE — ANESTHESIA PREPROCEDURE EVALUATION
Anesthesia Pre-Procedure Evaluation    Patient: Trinidad Brown   MRN: [id number] : 1945        Procedure : Procedure(s):  OPEN REDUCTION INTERNAL FIXATION, FRACTURE, ANKLE          Past Medical History:   Diagnosis Date    Alzheimer's dementia (H) 2023    Spinal stenosis 2019    Type 2 diabetes mellitus with hyperglycemia (H) 2015      Past Surgical History:   Procedure Laterality Date    BACK SURGERY      tumor removal    LUMBAR LAMINECTOMY Bilateral 3/29/2019    Procedure: Lumbar 3- Lumbar-4, Lumbar 4-Lumbar -5, Lumbar 5-Sacral 1,  decompressive laminectomy.;  Surgeon: Naeem Thomas MD;  Location: [facility] OR;  Service: Spine      No Known Allergies   Social History     Tobacco Use    Smoking status: Never    Smokeless tobacco: Never   Substance Use Topics    Alcohol use: No      Wt Readings from Last 1 Encounters:   23 88.5 kg (195 lb)        Anesthesia Evaluation            ROS/MED HX  ENT/Pulmonary:  - neg pulmonary ROS     Neurologic: Comment: Spinal stenosis    (+)   dementia,                             Cardiovascular:  - neg cardiovascular ROS     METS/Exercise Tolerance:     Hematologic:       Musculoskeletal:       GI/Hepatic:  - neg GI/hepatic ROS     Renal/Genitourinary:       Endo:     (+)  type II DM,             Obesity (BMI 39),       Psychiatric/Substance Use:       Infectious Disease:       Malignancy:       Other:            Physical Exam    Airway        Mallampati: II   TM distance: > 3 FB   Neck ROM: full   Mouth opening: > 3 cm    Respiratory Devices and Support         Dental       (+) Multiple visibly decayed, broken teeth      Cardiovascular          Rhythm and rate: regular     Pulmonary           breath sounds clear to auscultation           OUTSIDE LABS:  CBC:   Lab Results   Component Value Date    WBC 6.8 2023    WBC 7.3 2019    HGB 17.1 (H) 2023    HGB 10.7 (L) 2019    HCT 50.5 (H) 2023    HCT 33.3 (L) 2019      08/06/2023     04/09/2019     BMP:   Lab Results   Component Value Date     03/31/2019     03/28/2019    POTASSIUM 3.7 03/31/2019    POTASSIUM 4.0 03/28/2019    CHLORIDE 109 (H) 03/31/2019    CHLORIDE 107 03/28/2019    CO2 27 03/31/2019    CO2 23 03/28/2019    BUN 11 03/31/2019    BUN 8 03/28/2019    CR 0.61 03/31/2019    CR 0.66 03/28/2019     (H) 08/07/2023     (H) 08/07/2023     COAGS:   Lab Results   Component Value Date    PTT 29 03/26/2019    INR 1.07 08/06/2023     POC: No results found for: BGM, HCG, HCGS  HEPATIC: No results found for: ALBUMIN, PROTTOTAL, ALT, AST, GGT, ALKPHOS, BILITOTAL, BILIDIRECT, TAVO  OTHER:   Lab Results   Component Value Date    A1C 6.1 (H) 08/06/2023    SONY 8.4 (L) 03/31/2019    TSH 0.44 04/17/2019       Anesthesia Plan    ASA Status:  3    NPO Status:  NPO Appropriate    Anesthesia Type: Peripheral Nerve Block.              Consents    Anesthesia Plan(s) and associated risks, benefits, and realistic alternatives discussed. Questions answered and patient/representative(s) expressed understanding.     - Discussed: Risks, Benefits and Alternatives for BOTH SEDATION and the PROCEDURE were discussed     - Discussed with:  Patient            Postoperative Care            Comments:    Other Comments: Propofol gtt  Right adductor/popliteal PNB            Linda Boggs MD

## 2023-08-07 NOTE — CONSULTS
Care Management Initial Consult    General Information  Assessment completed with: Patient, Children, pt and grandjaky Garcia. but contact cheryl be dtr and greg Nguyen [phone]  Type of CM/SW Visit: Initial Assessment    Primary Care Provider verified and updated as needed: Yes   Readmission within the last 30 days: no previous admission in last 30 days      Reason for Consult: discharge planning  Advance Care Planning:       General Information Comments: pt lives w/dtr Armen and she will help her make decisions, at this time, pt doesn't want to have surgery    Communication Assessment  Patient's communication style: spoken language (non-English) (Hmong)             Cognitive  Cognitive/Neuro/Behavioral: .WDL except, orientation  Level of Consciousness: confused (baseline; hx dementia)     Orientation: time, place, disoriented to, person             Living Environment:   People in home: child(parvin), adult, grandchild(parvin)     Current living Arrangements: house      Able to return to prior arrangements: yes       Family/Social Support:  Care provided by: child(parvin)  Provides care for: no one  Marital Status: Single  PCA, Children          Description of Support System: Involved, Supportive    Support Assessment: Adequate family and caregiver support, Adequate social supports    Current Resources:   Patient receiving home care services: No     Community Resources: PCA  Equipment currently used at home:    Supplies currently used at home: None    Employment/Financial:  Employment Status:          Financial Concerns: No concerns identified   Referral to Financial Worker: No       Does the patient's insurance plan have a 3 day qualifying hospital stay waiver?  No    Lifestyle & Psychosocial Needs:  Social Determinants of Health     Tobacco Use: Low Risk  (8/6/2023)    Patient History     Smoking Tobacco Use: Never     Smokeless Tobacco Use: Never     Passive Exposure: Not on file   Alcohol Use: Not on file   Financial  Resource Strain: Not on file   Food Insecurity: Not on file   Transportation Needs: Not on file   Physical Activity: Not on file   Stress: Not on file   Social Connections: Not on file   Intimate Partner Violence: Not on file   Depression: Not on file   Housing Stability: Not on file       Functional Status:  Prior to admission patient needed assistance:   Dependent ADLs:: Independent  Dependent IADLs:: Transportation, Money Management, Medication Management, Meal Preparation, Shopping, Laundry, Cooking, Cleaning  Assesssment of Functional Status: Not at baseline with ADL Functioning    Mental Health Status:  Mental Health Status: No Current Concerns       Chemical Dependency Status:                Values/Beliefs:  Spiritual, Cultural Beliefs, Yazidi Practices, Values that affect care:                 Additional Information:  Assessed, lives w/dtr and grandson Patrick is main contact until dtr is back in town, [phone]. Pt has PCA svcs. Family can transport. CM to follow. Pt doesn't want to have surgery at this time. May need daughter here to help her decide.    Rigo Vargas RN

## 2023-08-07 NOTE — ANESTHESIA POSTPROCEDURE EVALUATION
Patient: Trinidad Brown    Procedure: Procedure(s):  OPEN REDUCTION INTERNAL FIXATION, FRACTURE, ANKLE, EXAM UNDER ANETHESIA RIGHT FOOT       Anesthesia Type:  Peripheral Nerve Block    Note:  Disposition: Inpatient   Postop Pain Control: Uneventful            Sign Out: Well controlled pain   PONV: No   Neuro/Psych: Uneventful            Sign Out: Acceptable/Baseline neuro status   Airway/Respiratory: Uneventful            Sign Out: Acceptable/Baseline resp. status   CV/Hemodynamics: Uneventful            Sign Out: Acceptable CV status; No obvious hypovolemia; No obvious fluid overload   Other NRE:    DID A NON-ROUTINE EVENT OCCUR?            Last vitals:  Vitals:    08/07/23 1220 08/07/23 1245 08/07/23 1504   BP:   115/62   Pulse: 77 73 73   Resp: 28  16   Temp:      SpO2: 100% 95% 97%       Electronically Signed By: Linda Boggs MD  August 7, 2023  3:13 PM

## 2023-08-07 NOTE — PROGRESS NOTES
"[facility]    Medicine Progress Note - Hospitalist Service    Date of Admission:  8/6/2023    Assessment & Plan     79 yo F with h/o DM2, spinal stenosis, and dementia who presented after a fall in the shower with an trimalleolar ankle fracture.  Went to OR today 8/7/23 for ORIF with plates/screws.  Will need PT/OT eval after surgery.     Principal Problem:    Trimalleolar fracture of right ankle - as above, pain control, PT/OT     Active Problems:    Type 2 diabetes mellitus with hyperglycemia - follow accuchecks and use sliding scale insulin. Usually takes trulicity at home and metformin, continue metformin and will just follow with sliding scale insulin as her A1c on above regimen is 6.1.      Spinal stenosis    Alzheimer's dementia - may need TCU likely at discharge.  Patient is pleasantly confused.    Dyslipidemia - continue statin as at home    Depression - continue celexa as at home     Diet: Advance Diet as Tolerated: Regular Diet Adult    DVT Prophylaxis: Enoxaparin (Lovenox) subcutaneous - start in AM  Uribe Catheter: Not present  Lines: None     Cardiac Monitoring: None  Code Status:  full    Clinically Significant Risk Factors                      # Dementia: noted on problem list    # Obesity: Estimated body mass index is 39.39 kg/m  as calculated from the following:    Height as of this encounter: 1.499 m (4' 11\").    Weight as of this encounter: 88.5 kg (195 lb)., PRESENT ON ADMISSION          Disposition Plan      Expected Discharge Date: 08/09/2023    Discharge Delays: PT Disposition recs needed  OT Disposition recs needed              Jarek Knight MD  Hospitalist Service  [facility]  Securely message with [employer] (more info)  Text page via [employer] Paging/Directory   ______________________________________________________________________    Interval History   No changes overnight. Pain controlled.    Physical Exam   Vital Signs: Temp: 97.5  F (36.4  C) " Temp src: Oral BP: (!) 146/85 Pulse: 71   Resp: 18 SpO2: 100 % O2 Device: Nasal cannula Oxygen Delivery: 2 LPM  Weight: 195 lbs 0 oz    General Appearance: Elderly F in NAD  Respiratory:  CTA  Cardiovascular: RRR S1S2  GI: +BS, soft, NT/ND  Skin: no rashes or lesions on exposed areas  Neuro: Alert, generally nonfocal on motor and sensory testing       Medical Decision Making       40 MINUTES SPENT BY ME on the date of service doing chart review, history, exam, documentation & further activities per the note.      Data         Imaging results reviewed over the past 24 hrs:   Recent Results (from the past 24 hour(s))   CT Ankle Right w/o Contrast    Narrative    EXAM: CT ANKLE RIGHT W/O CONTRAST  LOCATION: [facility]  DATE: 8/6/2023    INDICATION: right ankle fracture. CT for surgical planning.  COMPARISON: Radiographs from 08/06/2023.  TECHNIQUE: Noncontrast. Axial, sagittal and coronal thin-section reconstruction. Dose reduction techniques were used.     FINDINGS:     BONES:  -Comminuted fracture in the distal shaft of the fibula proximal to the distal tibiofibular syndesmosis. There is 4 mm of displacement. No angulation.    There is widening of the distal tibiofibular syndesmosis and there are tiny mildly displaced fractures between the distal tibia and fibula at the syndesmosis indicating tiny syndesmotic ligament avulsion fractures.    There is a posterior malleolar fracture. This is 5 mm AP by 12 mm transverse by 14 mm cephalocaudal and is displaced 2 mm. A 5 x 5 mm portion of the adjacent tibial plafond is impacted 2 mm.    There is a fracture at the base of the medial malleolus. This is displaced 5 mm.    The talus is moderately subluxed distally and laterally from the tibial plafond.    Partially included in the field-of-view is a fracture of the distal lateral aspect of the medial cuneiform and there is mild widening of the first and fourth TMT joints suggesting Lisfranc  "subluxation.    SOFT TISSUES:  -Edema in the soft tissues diffusely. No hematoma, cyst or mass. Diffuse muscle atrophy. Extensive arterial calcifications.      Impression    IMPRESSION:  1.  Distal shaft fibular fracture proximal to the distal tibiofibular syndesmosis. There is widening of the distal tibiofibular syndesmosis and there are tiny mildly displaced syndesmotic ligament avulsion fractures from the distal tibia and fibula.  2.  Posterior malleolar fracture.  3.  Medial malleolar fracture.  4.  Subluxation of the talus.  5.  Partially included in the field-of-view is a Lisfranc injury, with subluxation at the first and fourth TMT joints and intra-articular fracture of the distal end of the medial cuneiform. Dedicated foot CT could further evaluate.  6.  Extensive arterial calcifications.  7.  Diffuse muscle atrophy.     POC US Guidance Needle Placement    Narrative    Ultrasound was performed as guidance to an anesthesia procedure.  Click   \"PACS images\" hyperlink below to view any stored images.  For specific   procedure details, view procedure note authored by anesthesia.   CT Foot Right w/o Contrast    Narrative    EXAM: CT FOOT RIGHT W/O CONTRAST  LOCATION: [facility]  DATE: 8/7/2023    INDICATION: Right foot Lisfranc injury.  COMPARISON: None.  TECHNIQUE: Noncontrast. Axial, sagittal and coronal thin-section reconstruction. Dose reduction techniques were used.     FINDINGS:     BONES:  -Comminuted fracture of the distal aspect of the fibula without significant angulation deformity. Additional fracture of the medial malleolus as well as the anterolateral margin of the tibial plafond, likely related to syndesmotic ligamentous injury.   Additional fracture of the posterior malleolus.    Nondisplaced fracture of the medial cuneiform. Degenerative change at the first and fifth MTP joints. Plantar and Achilles calcaneal spurring.    SOFT TISSUES:  -Soft tissue swelling over the " dorsal aspect of the midfoot. No evidence for organized fluid collection.      Impression    IMPRESSION:  1.  Trimalleolar fracture of the ankle.  2.  Nondisplaced fracture of the medial cuneiform.  3.  Degenerative change first MTP joint.  4.  Plantar and Achilles calcaneal spurring.  5.  Soft tissue swelling about the midfoot.     XR Surgery THERESA L/T 5 Min Fluoro    Narrative    This exam was marked as non-reportable because it will not be read by a   radiologist or a [employer] non-radiologist provider.

## 2023-08-07 NOTE — PLAN OF CARE
Problem: Plan of Care - These are the overarching goals to be used throughout the patient stay.    Goal: Optimal Comfort and Wellbeing  Outcome: Progressing     Problem: Plan of Care - These are the overarching goals to be used throughout the patient stay.    Goal: Readiness for Transition of Care  Outcome: Progressing  Flowsheets (Taken 8/7/2023 1424)  Anticipated Changes Related to Illness: inability to care for self  Concerns to be Addressed:   adjustment to diagnosis/illness   cognitive/perceptual  Intervention: Mutually Develop Transition Plan  Flowsheets (Taken 8/7/2023 1424)  Anticipated Changes Related to Illness: inability to care for self  Concerns to be Addressed:   adjustment to diagnosis/illness   cognitive/perceptual     Problem: Risk for Delirium  Goal: Optimal Coping  Outcome: Progressing     Problem: Risk for Delirium  Goal: Improved Sleep  Outcome: Progressing   Goal Outcome Evaluation:       Pt was on bedrest overnight and throughout the morning. Had a purewick in place to manage voiding. Footdrop noted on L foot when assessing feet. Pt went down to imaging and surgery around 11:30am.

## 2023-08-07 NOTE — INTERVAL H&P NOTE
"I have reviewed the surgical (or preoperative) H&P that is linked to this encounter, and examined the patient. There are no significant changes    Clinical Conditions Present on Arrival:  Clinically Significant Risk Factors Present on Admission                  # Obesity: Estimated body mass index is 39.39 kg/m  as calculated from the following:    Height as of this encounter: 1.499 m (4' 11\").    Weight as of this encounter: 88.5 kg (195 lb).       "

## 2023-08-07 NOTE — OP NOTE
Operative Note    Name:  Trinidad Brown  PCP:  Macie Sarah  Procedure Date:  8/6/2023 - 8/7/2023      Pre-Procedure Diagnosis:  1.  Right ankle trimalleolar fracture dislocation  2.  Right midfoot Lisfranc fracture    Post-Procedure Diagnosis:    Same    Procedure: Procedure(s):  1.  Open reduction internal fixation right trimalleolar ankle fracture without fixation of the posterior lip  2.  Open reduction internal fixation right ankle syndesmosis  3.  Examination under anesthesia, right midfoot    Surgeon(s):  Jatin Amaya MD    Assistant: Alexandra Funes PA-C  The PAs assistance was necessary in soft tissue retraction, maintenance of reduction during fixation, closure of the wound, splint application, and for overall progression and efficiency of the case.    Anesthesia Type:  General with Block     Estimated Blood Loss:   25 cc    Specimens: * No specimens in log *     Complications:    None    Implants: Arthrex locking one third tubular plate with 3.5 mm cortical and locking screws.  3.5 mm cortical screws for the medial malleolus.    Indications for procedure: The patient is a pleasant 78-year-old female with history of spinal cord injury from a spinal surgery many years ago.  She has diabetes mellitus type 2 and dementia.  She lives independently with her daughter.  She pivot transfers, but otherwise is reliant on a wheelchair for ambulation.  She sustained a mechanical fall while exiting the shower yesterday injuring her right foot and ankle.  She was diagnosed with a right midfoot and right ankle fracture dislocation.  She was admitted to the hospitalist service, and optimized for surgery.  I met with the patient's daughter and granddaughter at the bedside and we discussed operative intervention.  Given the nondisplaced nature of her midfoot injury, we discussed examination under anesthesia but plan for nonoperative management.  We discussed with her unstable ankle fracture my recommendation would be for open  reduction internal fixation.  We discussed the risks of anesthesia including but not limited to heart attack, stroke, blood clot, pneumonia, and death.  We discussed the risks of surgery including but not limited to bleeding, infection, wound healing issues, damage to surrounding structures like blood vessels and nerves, postoperative numbness that may or may not resolve, complex regional pain syndrome, nonunion, malunion, prominent hardware, posttraumatic arthritis, need for further surgery.  No guarantees were made or given.  Ultimately after thorough discussion, the patient elected to proceed with surgery.  Signed informed consent was obtained and placed in the chart.    Procedure: The patient was met in the preoperative holding area.  The correct surgical site was marked with the patient's participation.  Informed consent was again reviewed with the patient, and all questions were answered.  The patient was administered regional blocks in the preoperative holding area by the anesthesia service.  The patient was transferred into the operating theater and placed supine on the operating table.  The patient has successful induction of anesthesia.  The patient was secured to the table using a safety strap, and all bony prominences were well-padded.  A tourniquet was applied to the operative thigh.  The right lower extremity was then prepped and draped in usual sterile fashion.  A timeout was performed with all members the operating team participating per hospital policy.  The correct patient, site, and procedure were all confirmed.  All in attendance were in agreement.  Preoperative antibiotic administration was confirmed.    The limb was exsanguinated using an Esmarch bandage.  The tourniquet was insufflated to 250 mmHg.  The Esmarch was removed.    A longitudinal incision was made over the posterior lateral aspect of the distal fibula.  Dissection was taken through the subcutaneous tissues, and crossing vessels  were cauterized.  Neurovascular structures were protected, and periosteal flaps were raised over the distal fibula.  The fracture site was identified.  The fracture was mobilized using a freer elevator, and cleared of all hematoma and soft tissue using a rongeur.  The fracture was then reduced into appropriate position using a pointed reduction forceps.  There was a large butterfly fragment posteriorly.  The main portion of the fracture was short oblique, and given the pattern with a butterfly fragment, no lag screw was attempted.  A one third tubular plate was contoured to the lateral aspect of the distal fibula.  A 3.5 mm cortical screw was placed proximal to the fracture site to lag the plate to bone.  The fracture was then reduced into appropriate position using a pointed reduction forceps.  A bicortical 3.5 mm cortical screw was placed distal to the fracture site.  Another cortical screw and a locking screw were placed distal to the fracture site.  2 more 3.5 mm cortical screws were placed proximal to the fracture site.  The pointed reduction forceps was removed, and the reduction of the fracture was seen to be appropriate.  C-arm imaging confirmed appropriate reduction and appropriate placement of the implants.    Attention was turned to the medial malleolus.  A longitudinal incision was made over the anterior third of the medial malleolus.  Dissection was taken through the subcutaneous tissues, and crossing vessels were cauterized.  Periosteal flaps were raised over the medial malleolar fracture.  The fracture was mobilized, and hematoma was evacuated.  There was a small full-thickness chondral lesion over the medial aspect of the talar dome.  No loose fragments of cartilage or bone were noted in the joint.  There was a small fragment of comminution without soft tissue attachments at the anterior aspect of the fracture site that was excised.  The fracture was then reduced into appropriate position using  pointed reduction forceps.  A K wire was placed perpendicular to the fracture site for rotational stability.  A 3.5 mm retrograde cortical lag screw was then placed across the fracture site with appropriate purchase.  The K wire was removed, and a second 3.5 mm cortical screw was placed posterior to the previously placed screw.  The clamp was removed, and C-arm imaging confirmed appropriate reduction of the fracture, and appropriate placement of the implants.    A cotton test under fluoroscopy as well as an external rotation stress test showed syndesmotic instability.  Given the patient's neuropathy and poor bone quality, we elected to place syndesmotic screws to add stability to the construct.    The first assistant reduced the syndesmosis using dorsiflexion and gentle posterior drawer.  Direct palpation confirmed reduction of the fibula and the incisura.  A 2.5 mm drill bit was used to drill parallel to the tibiotalar joint through all 4 cortices of the distal fibula and tibia.  A 3.5 mm cortical screw of appropriate length was placed.  Previously placed cortical screws were removed of the fibula, and 3 more syndesmotic screws were placed using this technique.  Syndesmotic screws placed proximal to the fracture site did not displace the short oblique portion of the fracture slightly and there was a small gap anteriorly and medially.  There was still good contact at the fracture site posteriorly and laterally, and this was excepted given the stability of the construct.    Final C-arm imaging confirmed appropriate reduction of the fractures, and appropriate placement of the implants.  There was no longer any instability with external rotation stress.  Posterior drawer under fluoroscopy showed no instability.  Direct palpation confirmed the fibula was reduced in the incisura.    AP, oblique, and lateral images of the midfoot were obtained.  Stress views obtained under fluoroscopy showed no significant widening  through the Lisfranc interval, and no subluxation of the tarsometatarsal joints.    The wounds were copiously irrigated using sterile saline.  The deep layer was closed with 3-0 Monocryl in running fashion.  The subcutaneous layer was closed with 3-0 Monocryl in buried interrupted fashion.  The skin was closed with 3-0 nylon sutures.  The leg was cleaned and dried.  A sterile dressing of Adaptic, 4 x 8 gauze, ABD pads, and sterile cast padding was applied.  A Valentin compression dressing with posterior and U plaster splints with the ankle held in neutral dorsiflexion was then applied.  Tourniquet cuff pressure was let down and appropriate capillary refill was seen to return to the distal extremity.  The patient was awakened from anesthesia and transferred the PACU in stable and awake condition.  All sponge and needle counts were correct at the end the case.    POSTOPERATIVE PLAN  -The patient will be strictly nonweightbearing on the right lower extremity  -She will maintain her splint clean dry and intact until follow-up  -We will plan for nonoperative management of the patient's midfoot injury given her activity level instability under fluoroscopy  -Aspirin 325 mg daily for DVT prophylaxis  -Dispo planning, PT/OT  -Follow-up in 2 weeks for wound check, suture removal, radiographs, and placement of a short leg cast    Jatin Amaya MD    Date: 8/7/2023  Time: 2:48 PM

## 2023-08-07 NOTE — PLAN OF CARE
"  Problem: Plan of Care - These are the overarching goals to be used throughout the patient stay.    Goal: Plan of Care Review  Description: The Plan of Care Review/Shift note should be completed every shift.  The Outcome Evaluation is a brief statement about your assessment that the patient is improving, declining, or no change.  This information will be displayed automatically on your shift note.  Outcome: Progressing  Goal: Patient-Specific Goal (Individualized)  Description: You can add care plan individualizations to a care plan. Examples of Individualization might be:  \"Parent requests to be called daily at 9am for status\", \"I have a hard time hearing out of my right ear\", or \"Do not touch me to wake me up as it startles me\".  Outcome: Progressing  Goal: Absence of Hospital-Acquired Illness or Injury  Outcome: Progressing  Intervention: Identify and Manage Fall Risk  Recent Flowsheet Documentation  Taken 8/7/2023 0033 by Manju Bosch RN  Safety Promotion/Fall Prevention:   lighting adjusted   room door open   room near nurse's station   safety round/check completed  Goal: Optimal Comfort and Wellbeing  Outcome: Progressing  Goal: Readiness for Transition of Care  Outcome: Progressing   Goal Outcome Evaluation:Patient transferred from ED to \[facility]\"" around 0020. Patient alert and oriented to self. Hmong speaking  needed.  Patient fell in the shower at home and sustain a bimalleolar fracture. NPO for medical reason and strict bedrest.  Denies pain, but  report tenderness with movement. Vomited once on arrival. IV Zofran given at 0033, effective per patient.  Purewick in place.  Patient slept well through the night. Safety maintained. Ceftriaxone infusing.                        "

## 2023-08-07 NOTE — ANESTHESIA PROCEDURE NOTES
Sciatic Procedure Note    Pre-Procedure   Staff -        Anesthesiologist:  Linda Boggs MD       Performed By: anesthesiologist       Location: pre-op       Procedure Start/Stop Times: 8/7/2023 12:19 PM and 8/7/2023 12:21 PM       Pre-Anesthestic Checklist: patient identified, IV checked, site marked, risks and benefits discussed, informed consent, monitors and equipment checked, pre-op evaluation, at physician/surgeon's request and post-op pain management  Timeout:       Correct Patient: Yes        Correct Procedure: Yes        Correct Site: Yes        Correct Position: Yes        Correct Laterality: Yes        Site Marked: Yes  Procedure Documentation  Procedure: Sciatic       Laterality: right       Patient Position: supine       Patient Prep/Sterile Barriers: sterile gloves, mask       Skin prep: Chloraprep (popliteal approach).       Needle Type: insulated       Needle Gauge: 20.        Needle Length (Inches): 4        Ultrasound guided       1. Ultrasound was used to identify targeted nerve, plexus, vascular marker, or fascial plane and place a needle adjacent to it in real-time.       2. Ultrasound was used to visualize the spread of anesthetic in close proximity to the above referenced structure.       3. A permanent image is entered into the patient's record.       4. The visualized anatomic structures appeared normal.       5. There were no apparent abnormal pathologic findings.    Assessment/Narrative         The placement was negative for: blood aspirated       Paresthesias: No.       Bolus given via needle..        Secured via.        Insertion/Infusion Method: Single Shot    Medication(s) Administered   Ropivacaine 0.5% w/ 1:200K Epi (Injection) (Mixture components: ropivacaine 5 MG/ML Soln, 50 mL; EPINEPHrine PF 1 MG/ML Soln, 0.25 mL) - Injection   25 mL - 8/7/2023 12:19:00 PM  Medication Administration Time: 8/7/2023 12:19 PM      FOR [facility] ([facility]) ONLY:   Pain Team Contact information:  "please page the Pain Team Via [facility]. Search \"Pain\". During daytime hours, please page the attending first. At night please page the resident first.      "

## 2023-08-07 NOTE — ANESTHESIA CARE TRANSFER NOTE
Patient: Trinidad Brown    Procedure: Procedure(s):  OPEN REDUCTION INTERNAL FIXATION, FRACTURE, ANKLE, EXAM UNDER ANETHESIA RIGHT FOOT       Diagnosis: Closed bimalleolar fracture, unspecified laterality, initial encounter [S89.243G]  Diagnosis Additional Information: No value filed.    Anesthesia Type:   Peripheral Nerve Block     Note:    Oropharynx: oropharynx clear of all foreign objects and spontaneously breathing  Level of Consciousness: awake  Oxygen Supplementation: nasal cannula  Level of Supplemental Oxygen (L/min / FiO2): 2  Independent Airway: airway patency satisfactory and stable  Dentition: dentition unchanged  Vital Signs Stable: post-procedure vital signs reviewed and stable  Report to RN Given: handoff report given  Patient transferred to: Medical/Surgical Unit  Comments: Report called to RN on P4 inpatient unit.  Handoff Report: Identifed the Patient, Identified the Reponsible Provider, Reviewed the pertinent medical history, Discussed the surgical course, Reviewed Intra-OP anesthesia mangement and issues during anesthesia, Set expectations for post-procedure period and Allowed opportunity for questions and acknowledgement of understanding      Vitals:  Vitals Value Taken Time   /62 08/07/23 1504   Temp 36.4C    Pulse 73 08/07/23 1504   Resp 16 08/07/23 1504   SpO2 97 % 08/07/23 1504       Electronically Signed By: MEET Mcgee CRNA  August 7, 2023  3:07 PM

## 2023-08-07 NOTE — ANESTHESIA PROCEDURE NOTES
"Adductor canal Procedure Note    Pre-Procedure   Staff -        Anesthesiologist:  Linda Boggs MD       Performed By: anesthesiologist       Location: pre-op       Procedure Start/Stop Times: 8/7/2023 12:17 PM and 8/7/2023 12:19 PM       Pre-Anesthestic Checklist: patient identified, IV checked, site marked, risks and benefits discussed, informed consent, monitors and equipment checked, pre-op evaluation, at physician/surgeon's request and post-op pain management  Timeout:       Correct Patient: Yes        Correct Procedure: Yes        Correct Site: Yes        Correct Position: Yes        Correct Laterality: Yes        Site Marked: Yes  Procedure Documentation  Procedure: Adductor canal       Laterality: right       Patient Position: supine       Patient Prep/Sterile Barriers: sterile gloves, mask, patient draped       Skin prep: Chloraprep       Needle Type: insulated       Needle Gauge: 20.        Needle Length (Inches): 4        Ultrasound guided       1. Ultrasound was used to identify targeted nerve, plexus, vascular marker, or fascial plane and place a needle adjacent to it in real-time.       2. Ultrasound was used to visualize the spread of anesthetic in close proximity to the above referenced structure.       3. A permanent image is entered into the patient's record.       4. The visualized anatomic structures appeared normal.       5. There were no apparent abnormal pathologic findings.  Medication(s) Administered   Ropivacaine 0.2% PF (Infiltration) - Infiltration   15 mL - 8/7/2023 12:17:00 PM  Medication Administration Time: 8/7/2023 12:17 PM      FOR [facility] ([facility]) ONLY:   Pain Team Contact information: please page the Pain Team Via [employer]. Search \"Pain\". During daytime hours, please page the attending first. At night please page the resident first.      "

## 2023-08-08 ENCOUNTER — APPOINTMENT (OUTPATIENT)
Dept: PHYSICAL THERAPY | Facility: HOSPITAL | Age: 78
DRG: 493 | End: 2023-08-08
Attending: STUDENT IN AN ORGANIZED HEALTH CARE EDUCATION/TRAINING PROGRAM
Payer: COMMERCIAL

## 2023-08-08 LAB
BACTERIA UR CULT: ABNORMAL
BACTERIA UR CULT: ABNORMAL
GLUCOSE SERPL-MCNC: 125 MG/DL (ref 70–99)
HGB BLD-MCNC: 10.5 G/DL (ref 11.7–15.7)

## 2023-08-08 PROCEDURE — 250N000011 HC RX IP 250 OP 636: Performed by: STUDENT IN AN ORGANIZED HEALTH CARE EDUCATION/TRAINING PROGRAM

## 2023-08-08 PROCEDURE — 85018 HEMOGLOBIN: CPT | Performed by: STUDENT IN AN ORGANIZED HEALTH CARE EDUCATION/TRAINING PROGRAM

## 2023-08-08 PROCEDURE — 99232 SBSQ HOSP IP/OBS MODERATE 35: CPT | Performed by: INTERNAL MEDICINE

## 2023-08-08 PROCEDURE — 82947 ASSAY GLUCOSE BLOOD QUANT: CPT | Performed by: STUDENT IN AN ORGANIZED HEALTH CARE EDUCATION/TRAINING PROGRAM

## 2023-08-08 PROCEDURE — 250N000013 HC RX MED GY IP 250 OP 250 PS 637

## 2023-08-08 PROCEDURE — 250N000011 HC RX IP 250 OP 636: Mod: JZ | Performed by: INTERNAL MEDICINE

## 2023-08-08 PROCEDURE — 97162 PT EVAL MOD COMPLEX 30 MIN: CPT | Mod: GP

## 2023-08-08 PROCEDURE — 97530 THERAPEUTIC ACTIVITIES: CPT | Mod: GP

## 2023-08-08 PROCEDURE — 120N000001 HC R&B MED SURG/OB

## 2023-08-08 PROCEDURE — 250N000013 HC RX MED GY IP 250 OP 250 PS 637: Performed by: STUDENT IN AN ORGANIZED HEALTH CARE EDUCATION/TRAINING PROGRAM

## 2023-08-08 PROCEDURE — 36415 COLL VENOUS BLD VENIPUNCTURE: CPT | Performed by: STUDENT IN AN ORGANIZED HEALTH CARE EDUCATION/TRAINING PROGRAM

## 2023-08-08 RX ORDER — ACETAMINOPHEN 325 MG/1
650 TABLET ORAL EVERY 4 HOURS PRN
Status: DISCONTINUED | OUTPATIENT
Start: 2023-08-08 | End: 2023-08-09 | Stop reason: HOSPADM

## 2023-08-08 RX ORDER — NALOXONE HYDROCHLORIDE 0.4 MG/ML
0.4 INJECTION, SOLUTION INTRAMUSCULAR; INTRAVENOUS; SUBCUTANEOUS
Status: DISCONTINUED | OUTPATIENT
Start: 2023-08-08 | End: 2023-08-09 | Stop reason: HOSPADM

## 2023-08-08 RX ORDER — ASPIRIN 325 MG
325 TABLET, DELAYED RELEASE (ENTERIC COATED) ORAL DAILY
Qty: 30 TABLET | Refills: 0 | Status: SHIPPED | OUTPATIENT
Start: 2023-08-09 | End: 2023-11-06

## 2023-08-08 RX ORDER — OXYCODONE HYDROCHLORIDE 5 MG/1
2.5 TABLET ORAL EVERY 4 HOURS PRN
Qty: 30 TABLET | Refills: 0 | Status: SHIPPED | OUTPATIENT
Start: 2023-08-08 | End: 2023-10-12

## 2023-08-08 RX ORDER — ACETAMINOPHEN 325 MG/1
650 TABLET ORAL EVERY 4 HOURS PRN
Qty: 60 TABLET | Refills: 0 | Status: SHIPPED | OUTPATIENT
Start: 2023-08-08 | End: 2023-10-25

## 2023-08-08 RX ORDER — ACETAMINOPHEN 650 MG/1
650 SUPPOSITORY RECTAL EVERY 4 HOURS PRN
Status: DISCONTINUED | OUTPATIENT
Start: 2023-08-08 | End: 2023-08-09 | Stop reason: HOSPADM

## 2023-08-08 RX ORDER — NALOXONE HYDROCHLORIDE 0.4 MG/ML
0.2 INJECTION, SOLUTION INTRAMUSCULAR; INTRAVENOUS; SUBCUTANEOUS
Status: DISCONTINUED | OUTPATIENT
Start: 2023-08-08 | End: 2023-08-09 | Stop reason: HOSPADM

## 2023-08-08 RX ORDER — AMOXICILLIN 250 MG
2 CAPSULE ORAL 2 TIMES DAILY
Qty: 30 TABLET | Refills: 0 | Status: SHIPPED | OUTPATIENT
Start: 2023-08-08 | End: 2023-09-01

## 2023-08-08 RX ADMIN — ACETAMINOPHEN 650 MG: 325 TABLET ORAL at 14:29

## 2023-08-08 RX ADMIN — POLYETHYLENE GLYCOL 3350 17 G: 17 POWDER, FOR SOLUTION ORAL at 08:01

## 2023-08-08 RX ADMIN — ASPIRIN 325 MG: 325 TABLET, COATED ORAL at 08:01

## 2023-08-08 RX ADMIN — SENNOSIDES AND DOCUSATE SODIUM 1 TABLET: 50; 8.6 TABLET ORAL at 08:01

## 2023-08-08 RX ADMIN — CEFAZOLIN SODIUM 2 G: 2 INJECTION, SOLUTION INTRAVENOUS at 05:41

## 2023-08-08 RX ADMIN — ONDANSETRON 4 MG: 4 TABLET, ORALLY DISINTEGRATING ORAL at 03:10

## 2023-08-08 RX ADMIN — OXYCODONE HYDROCHLORIDE 2.5 MG: 5 TABLET ORAL at 03:10

## 2023-08-08 RX ADMIN — ENOXAPARIN SODIUM 40 MG: 40 INJECTION SUBCUTANEOUS at 08:01

## 2023-08-08 RX ADMIN — SENNOSIDES AND DOCUSATE SODIUM 1 TABLET: 50; 8.6 TABLET ORAL at 20:47

## 2023-08-08 RX ADMIN — ACETAMINOPHEN 650 MG: 325 TABLET ORAL at 03:10

## 2023-08-08 RX ADMIN — ACETAMINOPHEN 650 MG: 325 TABLET ORAL at 08:01

## 2023-08-08 ASSESSMENT — ACTIVITIES OF DAILY LIVING (ADL)
ADLS_ACUITY_SCORE: 51
ADLS_ACUITY_SCORE: 51
ADLS_ACUITY_SCORE: 47
ADLS_ACUITY_SCORE: 47
ADLS_ACUITY_SCORE: 51
ADLS_ACUITY_SCORE: 47
ADLS_ACUITY_SCORE: 47
ADLS_ACUITY_SCORE: 51
ADLS_ACUITY_SCORE: 47
ADLS_ACUITY_SCORE: 47

## 2023-08-08 NOTE — PROGRESS NOTES
Physical Therapy        08/08/23 1600   Appointment Info   Signing Clinician's Name / Credentials (PT) Gabriella Do DPT      Language Granddaughter interpreted (Hmong)   Living Environment   People in Home child(parvin), adult;grandchild(parvin)   Current Living Arrangements house   Home Accessibility no concerns  (ramped entrance)   Self-Care   Equipment Currently Used at Home wheelchair, manual   Activity/Exercise/Self-Care Comment at baseline, pt requires minAx1 to perform pivot transfers to/from wheelchair. Total cares.   General Information   Onset of Illness/Injury or Date of Surgery 08/06/23   Referring Physician Alexandra Funes PA-C   Patient/Family Therapy Goals Statement (PT) d/c planning, family intends for pt to go to TCU   Pertinent History of Current Problem (include personal factors and/or comorbidities that impact the POC) bimalleolar ankle fracture s/p ORIF   Existing Precautions/Restrictions weight bearing;fall   Weight-Bearing Status - RLE nonweight-bearing   Cognition   Orientation Status (Cognition) oriented to;place   Cognitive Status Comments dementia at baseline.   Pain Assessment   Patient Currently in Pain No   Integumentary/Edema   Integumentary/Edema no deficits were identifed   Posture    Posture Not impaired   Range of Motion (ROM)   Range of Motion ROM deficits secondary to surgical procedure   Strength (Manual Muscle Testing)   Strength (Manual Muscle Testing) Deficits observed during functional mobility   Bed Mobility   Comment, (Bed Mobility) pt in longsitting, able to shift to sitting EOB with SBA   Transfers   Comment, (Transfers) unable to perform sit>stand while maintaining NWB   Sensory Examination   Sensory Perception Comments BLE numbness at baseline   Coordination   Coordination no deficits were identified   Muscle Tone   Muscle Tone no deficits were identified   Clinical Impression   Criteria for Skilled Therapeutic Intervention Yes, treatment indicated   PT Diagnosis  (PT) inability to transfer   Influenced by the following impairments weightbearing restriction, impaired strength, sensation, and cognition   Functional limitations due to impairments unable to get into w/c for household mobility   Clinical Presentation (PT Evaluation Complexity) Stable/Uncomplicated   Clinical Presentation Rationale presents as medically diagnosed   Clinical Decision Making (Complexity) moderate complexity   Planned Therapy Interventions (PT) balance training;bed mobility training;gait training;neuromuscular re-education;strengthening;transfer training;wheelchair management/propulsion training;progressive activity/exercise   Anticipated Equipment Needs at Discharge (PT) lift device   Risk & Benefits of therapy have been explained evaluation/treatment results reviewed;care plan/treatment goals reviewed;current/potential barriers reviewed;participants voiced agreement with care plan;participants included;caregiver   PT Total Evaluation Time   PT Eval, Moderate Complexity Minutes (52502) 15   Physical Therapy Goals   PT Frequency Daily   PT Predicted Duration/Target Date for Goal Attainment 08/15/23   PT Goals Bed Mobility;Transfers   PT: Bed Mobility Minimal assist;Within precautions;Supine to/from sit   PT: Transfers Minimal assist;Bed to/from chair;Within precautions   Interventions   Interventions Quick Adds Therapeutic Activity   Therapeutic Activity   Therapeutic Activities: dynamic activities to improve functional performance Minutes (53494) 10   Symptoms Noted During/After Treatment Fatigue   Treatment Detail/Skilled Intervention increased time for interpretating and repetition due to cognition. Pt ed on NWB status and rationale. Jessy to scoot forward and back seated EOB. modA sit to supine and supine scoot with cues to maintain NWB. pre-transfer training at EOB performing seated lateral scoot with weight shift onto good foot and manual assistance to keep RLE elevated. modA to scoot, unable to  fully lift body from bed   PT Discharge Planning   PT Plan progress toward transfers, NWB RLE   PT Discharge Recommendation (DC Rec) Transitional Care Facility   PT Rationale for DC Rec in order to d/c home, pt would require cory lift   PT Brief overview of current status bed mobility with assistance, unable to transfer, difficulty remembering ankle is broken, unable to comprehend NWB status   Total Session Time   Timed Code Treatment Minutes 10   Total Session Time (sum of timed and untimed services) 25     Gabriella Do, PT

## 2023-08-08 NOTE — PLAN OF CARE
"  Problem: Plan of Care - These are the overarching goals to be used throughout the patient stay.    Goal: Plan of Care Review  Description: The Plan of Care Review/Shift note should be completed every shift.  The Outcome Evaluation is a brief statement about your assessment that the patient is improving, declining, or no change.  This information will be displayed automatically on your shift note.  Outcome: Progressing  Goal: Patient-Specific Goal (Individualized)  Description: You can add care plan individualizations to a care plan. Examples of Individualization might be:  \"Parent requests to be called daily at 9am for status\", \"I have a hard time hearing out of my right ear\", or \"Do not touch me to wake me up as it startles me\".  Outcome: Progressing  Goal: Absence of Hospital-Acquired Illness or Injury  Outcome: Progressing  Intervention: Identify and Manage Fall Risk  Recent Flowsheet Documentation  Taken 8/8/2023 0145 by Fadumo Morocho, RN  Safety Promotion/Fall Prevention:   activity supervised   lighting adjusted  Intervention: Prevent and Manage VTE (Venous Thromboembolism) Risk  Recent Flowsheet Documentation  Taken 8/8/2023 0145 by Fadumo Morocho, RN  VTE Prevention/Management: (left leg) SCDs (sequential compression devices) on  Goal: Optimal Comfort and Wellbeing  Outcome: Progressing  Goal: Readiness for Transition of Care  Outcome: Progressing   Goal Outcome Evaluation:       Pt reports severe pain in right foot via [place] . Pt is confused to time and place. Unable to rate pain. Oxycodone, tylenol, and zofran given. Pt reports pain to be \"tolerable\" after pain meds.                 "

## 2023-08-08 NOTE — PLAN OF CARE
Problem: Plan of Care - These are the overarching goals to be used throughout the patient stay.    Goal: Optimal Comfort and Wellbeing  Outcome: Progressing     Problem: Surgery Nonspecified  Goal: Absence of Infection Signs and Symptoms  Outcome: Progressing     Problem: Surgery Nonspecified  Goal: Effective Urinary Elimination  Outcome: Progressing   Goal Outcome Evaluation:      Pt confused to time situation and place.Pt comfortable. Pt ate all her dinner that family brought, Toes warm. Pt comfortably. Denies pain Ice on ankle. Pt voiding with purewick

## 2023-08-08 NOTE — PLAN OF CARE
Problem: Plan of Care - These are the overarching goals to be used throughout the patient stay.    Goal: Plan of Care Review  Description: The Plan of Care Review/Shift note should be completed every shift.  The Outcome Evaluation is a brief statement about your assessment that the patient is improving, declining, or no change.  This information will be displayed automatically on your shift note.  Outcome: Progressing  Flowsheets (Taken 8/8/2023 1149)  Plan of Care Reviewed With: patient     Problem: Risk for Delirium  Goal: Optimal Coping  Outcome: Progressing   Goal Outcome Evaluation:-alert to self - history dementia  Problem: Pain Acute  Goal: Optimal Pain Control and Function  Outcome: Progressing-monitoring pain - using dementia pain scale and will medicate as per mar         Plan of Care Reviewed With: patient

## 2023-08-08 NOTE — PROGRESS NOTES
"Spoke to daughter Armen via telephone. She feels patient will need \"nursing home\" prior to returning home. Would like referrals sent to [facility].   "

## 2023-08-08 NOTE — PROGRESS NOTES
"Orthopedic Progress Note      Assessment: 1 Day Post-Op  S/P Procedure(s):  OPEN REDUCTION INTERNAL FIXATION, FRACTURE, ANKLE, EXAM UNDER ANETHESIA RIGHT FOOT     Plan:   - Continue PT/OT  - Weightbearing status: NWB RLE  - Activity: Up with assist and assistive device until independent.  - Anticoagulation:  PO in addition to SCDs, adam stockings and early ambulation.  - Antibiotics: None  - Pain Management; continue current regimen  - Diet: progress diet as tolerated  - Labs: hgb 10.5, transfuse if <7.0. No indication today  - Dressing: Keep dry and intact  - Elevation: Elevate RLE on pillow to keep above the level of the heart as much as possible  - Follow-up: Outpatient follow up in 2 weeks  - Disposition: Anticipate discharge pending work with therapies, medical stability.      Subjective:  Pain: moderate  Nausea, Vomiting:  No  Lightheadedness, Dizziness:  No  Neuro:  Patient denies new onset numbness or paresthesias  Fever, chills: No  Chest pain: No  SOB: No    Patient reports feeling well today. Patient reports pain is tolerable with current pain regimen. Patient eating and drinking well. Patient voiding and passing gas however no BM.  Feels like she could have a BM this morning. All questions/concerns answered.      Objective:  /55 (BP Location: Right arm)   Pulse 79   Temp 97.3  F (36.3  C) (Oral)   Resp 18   Ht 1.499 m (4' 11\")   Wt 88.5 kg (195 lb)   SpO2 93%   BMI 39.39 kg/m      The patient is A&Ox3. Appears comfortable, sitting up in bed  Calves without tenderness, neg Luly's  Brisk capillary refill in the toes.   Pulses not palpable due to splint  Right foot warm & well-perfused.  Sensation is intact to light touch & equal bilaterally in the femoral, DP, SP & tibial nerve distributions.  ROM: Appropriately flexes & extends all toes bilaterally.   Leg lengths equal.  Dressing C/D/I without strikethrough, no surrounding erythema.  Splint in place      No drain     Pertinent Labs "   Lab Results: personally reviewed.   Lab Results   Component Value Date    INR 1.07 08/06/2023    INR 1.04 03/26/2019     Lab Results   Component Value Date    WBC 6.8 08/06/2023    HGB 10.5 (L) 08/08/2023    HCT 50.5 (H) 08/06/2023    MCV 95 08/06/2023     08/06/2023     Lab Results   Component Value Date     03/31/2019    CO2 27 03/31/2019         Report completed by:  DONALD TAVERA PA-C  Date: 08/08/2023  [facility]

## 2023-08-09 VITALS
OXYGEN SATURATION: 97 % | TEMPERATURE: 98.4 F | RESPIRATION RATE: 19 BRPM | DIASTOLIC BLOOD PRESSURE: 73 MMHG | HEIGHT: 59 IN | BODY MASS INDEX: 39.31 KG/M2 | HEART RATE: 86 BPM | SYSTOLIC BLOOD PRESSURE: 117 MMHG | WEIGHT: 195 LBS

## 2023-08-09 LAB
ATRIAL RATE - MUSE: 88 BPM
DIASTOLIC BLOOD PRESSURE - MUSE: NORMAL MMHG
GLUCOSE BLDC GLUCOMTR-MCNC: 142 MG/DL (ref 70–99)
HGB BLD-MCNC: 11 G/DL (ref 11.7–15.7)
INTERPRETATION ECG - MUSE: NORMAL
P AXIS - MUSE: 37 DEGREES
PR INTERVAL - MUSE: 176 MS
QRS DURATION - MUSE: 70 MS
QT - MUSE: 372 MS
QTC - MUSE: 450 MS
R AXIS - MUSE: 52 DEGREES
SYSTOLIC BLOOD PRESSURE - MUSE: NORMAL MMHG
T AXIS - MUSE: 51 DEGREES
VENTRICULAR RATE- MUSE: 88 BPM

## 2023-08-09 PROCEDURE — 250N000013 HC RX MED GY IP 250 OP 250 PS 637: Performed by: STUDENT IN AN ORGANIZED HEALTH CARE EDUCATION/TRAINING PROGRAM

## 2023-08-09 PROCEDURE — 250N000011 HC RX IP 250 OP 636: Mod: JZ | Performed by: INTERNAL MEDICINE

## 2023-08-09 PROCEDURE — 99239 HOSP IP/OBS DSCHRG MGMT >30: CPT | Performed by: INTERNAL MEDICINE

## 2023-08-09 PROCEDURE — 85018 HEMOGLOBIN: CPT | Performed by: STUDENT IN AN ORGANIZED HEALTH CARE EDUCATION/TRAINING PROGRAM

## 2023-08-09 PROCEDURE — 36415 COLL VENOUS BLD VENIPUNCTURE: CPT | Performed by: STUDENT IN AN ORGANIZED HEALTH CARE EDUCATION/TRAINING PROGRAM

## 2023-08-09 PROCEDURE — 250N000013 HC RX MED GY IP 250 OP 250 PS 637

## 2023-08-09 RX ADMIN — ASPIRIN 325 MG: 325 TABLET, COATED ORAL at 09:15

## 2023-08-09 RX ADMIN — ENOXAPARIN SODIUM 40 MG: 40 INJECTION SUBCUTANEOUS at 09:15

## 2023-08-09 RX ADMIN — ACETAMINOPHEN 650 MG: 325 TABLET ORAL at 09:20

## 2023-08-09 RX ADMIN — SENNOSIDES AND DOCUSATE SODIUM 1 TABLET: 50; 8.6 TABLET ORAL at 09:15

## 2023-08-09 RX ADMIN — POLYETHYLENE GLYCOL 3350 17 G: 17 POWDER, FOR SOLUTION ORAL at 09:14

## 2023-08-09 ASSESSMENT — ACTIVITIES OF DAILY LIVING (ADL)
ADLS_ACUITY_SCORE: 47

## 2023-08-09 NOTE — PROGRESS NOTES
Care Management Discharge Note    Discharge Date: 08/09/2023       Discharge Disposition: Transitional Care    Discharge Services:      Discharge DME:      Discharge Transportation: agency    Private pay costs discussed: transportation costs    Does the patient's insurance plan have a 3 day qualifying hospital stay waiver?  Yes   Will the waiver be used for post-acute placement? No    PAS Confirmation Code:  [id number]  Patient/family educated on Medicare website which has current facility and service quality ratings:      Education Provided on the Discharge Plan:    Persons Notified of Discharge Plans: lawanda Ayers  Patient/Family in Agreement with the Plan:      Handoff Referral Completed: Yes    Additional Information:  See below     Emperatriz Macedo RN        Patient accepted at [facility]. Called lawanda Ayers with  . She is agreeable . Requests transport be arranged.     Spoke to nurse. Discussed that stretcher would be safest for transport    Transport arranged for [id number]. [place] nurse, [place] [facility] completed and put in chart

## 2023-08-09 NOTE — PROGRESS NOTES
Physical Therapy Discharge Summary    Reason for therapy discharge:    Discharged to TCU.    Progress towards therapy goal(s). See goals on Care Plan in [facility] electronic health record for goal details.  Goals partially met.  Barriers to achieving goals:   discharge from facility.    Therapy recommendation(s):    Further recommended therapy is related to documented deficits, and is necessary to maximize functional independence in order for patient to return to prior level of function.      Gabriella Do, SANDEEP 8/9/2023

## 2023-08-09 NOTE — DISCHARGE SUMMARY
[facility] MEDICINE  DISCHARGE SUMMARY     Primary Care Physician: Sarah Quijano  Admission Date: 8/6/2023   Discharge Provider: Rey SNYDER MD Discharge Date: 8/9/2023   Diet: Diabetic diet  Active Diet and Nourishment Order   Procedures      Code Status: Full Code   Activity: As recommended by orthopedic surgeon        Condition at Discharge: Stable     REASON FOR PRESENTATION(See Admission Note for Details)   Fall and sustained right ankle fracture.  Please refer to H&P for details    PRINCIPAL & ACTIVE DISCHARGE DIAGNOSES     Principal Problem:    Bimalleolar fracture  Active Problems:    Type 2 diabetes mellitus with hyperglycemia (H)    Spinal stenosis    Alzheimer's dementia (H)      PENDING LABS     Unresulted Labs Ordered in the Past 30 Days of this Admission       No orders found from 7/7/2023 to 8/7/2023.              PROCEDURES ( this hospitalization only)      Procedure(s):  OPEN REDUCTION INTERNAL FIXATION, FRACTURE, ANKLE, EXAM UNDER ANETHESIA RIGHT FOOT    RECOMMENDATIONS TO OUTPATIENT PROVIDER FOR F/U VISIT     Follow-up Appointments     Follow Up Care      Please follow-up with Dr. Amaya's team in 2 weeks at [facility].   Call our scheduling line at [phone] to make an appointment, if you do   not already have one scheduled.    Advised TCU provider to check CBC, BMP in a week and follow the results.        Follow Up and recommended labs and tests      Follow up with Nursing home physician.                DISPOSITION     Skilled Nursing Facility    SUMMARY OF HOSPITAL COURSE:      77 yo F with with past medical history of DM2, spinal stenosis, and dementia who presented after a fall in the shower with an trimalleolar ankle fracture.  Patient went to OR 8/7/23 for ORIF with plates/screws.  Postoperative course fairly unremarkable.  Continue postoperative care, activity level, DVT prophylaxis as instructed by orthopedic surgery team.  Follow-up with them as  instructed.  Patient had hemoglobin of 17 on admission thought to be likely due to volume depletion.  Patient had acute blood loss anemia postoperative and hemodilution.  Hemoglobin 10.5 on 8/8, repeat hemoglobin 11.0 on 8/9.  Advised TCU provider to check hemoglobin in a week.  Resumed home diabetic medications.  Patient has history of Alzheimer dementia and pleasantly confused, no behavioral issues.   informed that TCU bed available today and patient is getting discharged this afternoon.  Pain prescription written by orthopedic team.      Discharge Medications with Med changes:     Current Discharge Medication List        START taking these medications    Details   acetaminophen (TYLENOL) 325 MG tablet Take 2 tablets (650 mg) by mouth every 4 hours as needed for mild pain  Qty: 60 tablet, Refills: 0    Associated Diagnoses: Closed bimalleolar fracture, unspecified laterality, initial encounter      aspirin (ASA) 325 MG EC tablet Take 1 tablet (325 mg) by mouth daily  Qty: 30 tablet, Refills: 0    Associated Diagnoses: Closed bimalleolar fracture, unspecified laterality, initial encounter      oxyCODONE (ROXICODONE) 5 MG tablet Take 0.5 tablets (2.5 mg) by mouth every 4 hours as needed for moderate pain  Qty: 30 tablet, Refills: 0    Associated Diagnoses: Closed bimalleolar fracture, unspecified laterality, initial encounter      senna-docusate (SENOKOT-S/PERICOLACE) 8.6-50 MG tablet Take 2 tablets by mouth 2 times daily  Qty: 30 tablet, Refills: 0    Associated Diagnoses: Closed bimalleolar fracture, unspecified laterality, initial encounter           CONTINUE these medications which have NOT CHANGED    Details   atorvastatin (LIPITOR) 40 MG tablet [ATORVASTATIN (LIPITOR) 40 MG TABLET] Take 40 mg by mouth at bedtime.      citalopram (CELEXA) 10 MG tablet Take 10 mg by mouth daily      dulaglutide (TRULICITY) 0.75 MG/0.5ML pen Inject 0.75 mg Subcutaneous every 7 days      metFORMIN (GLUCOPHAGE) 1000 MG  tablet [METFORMIN (GLUCOPHAGE) 1000 MG TABLET] Take 1,000 mg by mouth 2 (two) times a day with meals.                   Rationale for medication changes:      No change on home medication regimen      Consults       CARE MANAGEMENT / SOCIAL WORK IP CONSULT  PHYSICAL THERAPY ADULT IP CONSULT  PHYSICAL THERAPY ADULT IP CONSULT  OCCUPATIONAL THERAPY ADULT IP CONSULT  OCCUPATIONAL THERAPY ADULT IP CONSULT    Immunizations given this encounter     Most Recent Immunizations   Administered Date(s) Administered     COVID-19 Bivalent 18+ (Moderna) 01/31/2023     COVID-19 Monovalent 18+ (Moderna) 03/10/2021     Flu, Unspecified 09/16/2015     Influenza (High Dose) 3 valent vaccine 03/26/2019           Anticoagulation Information      Recent INR results:   Recent Labs   Lab 08/06/23  1552   INR 1.07           SIGNIFICANT IMAGING FINDINGS     Results for orders placed or performed during the hospital encounter of 08/06/23   Ankle XR, G/E 3 views, right    Impression    IMPRESSION: Osseous demineralization. Acute mildly comminuted oblique fracture of the distal fibular metadiaphysis with slight apex posterior angulation. Mildly displaced intra-articular medial malleolus fracture. Lateral and anterior subluxation of the   talus in relation to the tibial plafond.    Mild tricompartmental degenerative arthritis of the knee. Mild degenerative arthritis of the midfoot. Small plantar and Achilles calcaneal enthesophytes.    Vascular calcifications.     Soft tissue edema about the ankle.     NOTE: ABNORMAL REPORT    THE DICTATION ABOVE DESCRIBES AN ABNORMALITY FOR WHICH FOLLOW-UP IS NEEDED.     XR Tibia and Fibula Right 2 Views    Impression    IMPRESSION: Osseous demineralization. Acute mildly comminuted oblique fracture of the distal fibular metadiaphysis with slight apex posterior angulation. Mildly displaced intra-articular medial malleolus fracture. Lateral and anterior subluxation of the   talus in relation to the tibial  plafond.    Mild tricompartmental degenerative arthritis of the knee. Mild degenerative arthritis of the midfoot. Small plantar and Achilles calcaneal enthesophytes.    Vascular calcifications.     Soft tissue edema about the ankle.     NOTE: ABNORMAL REPORT    THE DICTATION ABOVE DESCRIBES AN ABNORMALITY FOR WHICH FOLLOW-UP IS NEEDED.     XR Chest Port 1 View    Impression    IMPRESSION: Cardiomediastinal silhouette is normal. Normal vasculature. Lungs and pleural spaces are clear.   CT Ankle Right w/o Contrast    Impression    IMPRESSION:  1.  Distal shaft fibular fracture proximal to the distal tibiofibular syndesmosis. There is widening of the distal tibiofibular syndesmosis and there are tiny mildly displaced syndesmotic ligament avulsion fractures from the distal tibia and fibula.  2.  Posterior malleolar fracture.  3.  Medial malleolar fracture.  4.  Subluxation of the talus.  5.  Partially included in the field-of-view is a Lisfranc injury, with subluxation at the first and fourth TMT joints and intra-articular fracture of the distal end of the medial cuneiform. Dedicated foot CT could further evaluate.  6.  Extensive arterial calcifications.  7.  Diffuse muscle atrophy.     CT Foot Right w/o Contrast    Impression    IMPRESSION:  1.  Trimalleolar fracture of the ankle.  2.  Nondisplaced fracture of the medial cuneiform.  3.  Degenerative change first MTP joint.  4.  Plantar and Achilles calcaneal spurring.  5.  Soft tissue swelling about the midfoot.         SIGNIFICANT LABORATORY FINDINGS     Most Recent 3 CBC's:  Recent Labs   Lab Test 08/09/23  0547 08/08/23  0601 08/06/23  1552 04/09/19  0545 04/05/19  0640 04/01/19  0610   WBC  --   --  6.8 7.3  --  11.3*   HGB 11.0* 10.5* 17.1* 10.7*   < > 8.8*   MCV  --   --  95 99  --  94   PLT  --   --  153 333  --  196    < > = values in this interval not displayed.     Most Recent 3 BMP's:  Recent Labs   Lab Test 08/09/23  0851 08/08/23  0601 08/07/23  1123  "03/31/19  0742 03/31/19  0631 03/28/19  0624 03/28/19  0600 03/26/19  0754 03/26/19  0550   NA  --   --   --   --  140  --  138  --  138   POTASSIUM  --   --   --   --  3.7  --  4.0  --  4.4   CHLORIDE  --   --   --   --  109*  --  107  --  106   CO2  --   --   --   --  27  --  23  --  25   BUN  --   --   --   --  11  --  8  --  6*   CR  --   --   --   --  0.61  --  0.66  --  0.70   ANIONGAP  --   --   --   --  4*  --  8  --  7   SONY  --   --   --   --  8.4*  --  8.6  --  8.9   * 125* 112*   < > 126*   < > 163*   < > 199*    < > = values in this interval not displayed.       Discharge Orders        Reason for your hospital stay    S/p right ankle ORIF     When to call - Contact Surgeon Team    You may experience symptoms that require follow-up before your scheduled appointment. Refer to the \"Stoplight Tool\" for instructions on when to contact your Surgeon Team if you are concerned about pain control, blood clots, constipation, or if you are unable to urinate.     When to call - Reach out to Urgent Care    If you are not able to reach your Surgeon Team and you need immediate care, go to the Orthopedic Walk-in Clinic or Urgent Care at your Surgeon's office.  Do NOT go to the Emergency Room unless you have shortness of breath, chest pain, or other signs of a medical emergency.     When to call - Reasons to Call 911    Call 911 immediately if you experience sudden-onset chest pain, arm weakness/numbness, slurred speech, or shortness of breath     Discharge Instruction - Breathing exercises    Perform breathing exercises using your Incentive Spirometer 10 times per hour while awake for 2 weeks.     Symptoms - Fever Management    A low grade fever can be expected after surgery.  Use acetaminophen (TYLENOL) as needed for fever management.  Contact your Surgeon Team if you have a fever greater than 101.5 F, chills, and/or night sweats.     Symptoms - Constipation management    Constipation (hard, dry bowel movements) " is expected after surgery due to the combination of being less active, the anesthetic, and the opioid pain medication.  You can do the following to help reduce constipation:  ~  FLUIDS:  Drink clear liquids (water or Gatorade), or juice (apple/prune).  ~  DIET:  Eat a fiber rich diet.    ~  ACTIVITY:  Get up and move around several times a day.  Increase your activity as you are able.  MEDICATIONS:  Reduce the risk of constipation by starting medications before you are constipated.  You can take Miralax   (1 packet as directed) and/or a stool softener (Senokot 1-2 tablets 1-2 times a day).  If you already have constipation and these medications are not working, you can get magnesium citrate and use as directed.  If you continue to have constipation you can try an over the counter suppository or enema.  Call your Surgeon Team if it has been greater than 3 days since your last bowel movement.     Symptoms - Reduced Urine Output    Changes in the amount of fluids you drank before and after surgery may result in problems urinating.  It is important to stay well-hydrated after surgery and drink plenty of water. If it has been greater than 8 hours since you have urinated despite drinking plenty of water, call your Surgeon Team.     Activity - Exercises to prevent blood clots    Unless otherwise directed by your Surgeon team, perform the following exercises at least three times per day for the first four weeks after surgery to prevent blood clots in your legs: 1) Point and flex your feet (Ankle Pumps), 2) Move your ankle around in big circles, 3) Wiggle your toes, 4) Walk, even for short distances, several times a day, will help decrease the risk of blood clots.     Comfort and Pain Management - Pain after Surgery    Pain after surgery is normal and expected.  You will have some amount of pain for several weeks after surgery.  Your pain will improve with time.  There are several things you can do to help reduce your pain  including: rest, ice, elevation, and using pain medications as needed. Contact your Surgeon Team if you have pain that persists or worsens after surgery despite rest, ice, elevation, and taking your medication(s) as prescribed. Contact your Surgeon Team if you have new numbness, tingling, or weakness in your operative extremity.     Comfort and Pain Management - Swelling after Surgery    Swelling and/or bruising of the surgical extremity is common and may persist for several months after surgery. In addition to frequent icing and elevation, gentle compressive support with an ACE wrap or tubigrip may help with swelling. Apply compression regularly, removing at least twice daily to perform skin checks. Contact your Surgeon Team if your swelling increases and is NOT associated with an increase in your activity level, or if your swelling increases and is associated with redness and pain.     Comfort and Pain Management - Cold therapy    Ice can be used to control swelling and discomfort after surgery. Place a thin towel over your operative site and apply the ice pack overtop. Leave ice pack in place for 20 minutes, then remove for 20 minutes. Repeat this 20 minutes on/20 minutes off routine as often as tolerated.     Medication Instructions - Acetaminophen (TYLENOL) Instructions    You were discharged with acetaminophen (TYLENOL) for pain management after surgery. Acetaminophen most effectively manages pain symptoms when it is taken on a schedule without missing doses (every four, six, or eight hours). Your Provider will prescribe a safe daily dose between 3000 - 4000 mg.  Do NOT exceed this daily dose. Most patients use acetaminophen for pain control for the first four weeks after surgery.  You can wean from this medication as your pain decreases.     Medication Instructions - Opioids - Tapering Instructions    In the first three days following surgery, your symptoms may warrant use of the narcotic pain medication  "every four to six hours as prescribed. This is normal. As your pain symptoms improve, focus your efforts on decreasing (tapering) use of narcotic medications. The most successful tapering strategy is to first, decrease the number of tablets you take every 4-6 hours to the minimum prescribed. Then, increase the amount of time between doses.  For example:  First, taper to   or 1 tablet every 4-6 hours.  Then, taper to   or 1 tablet every 6-8 hours.  Then, taper to   or 1 tablet every 8-10 hours.  Then, taper to   or 1 tablet every 10-12 hours.  Then, taper to   or 1 tablet at bedtime.  The bedtime dose can help with comfort during sleep and is typically the last dose to be discontinued after surgery.     Medication instructions -  Anticoagulation - aspirin    Take the aspirin as prescribed for a total of four weeks after surgery.  This is given to help minimize your risk of blood clot.     Comfort and Pain Management - LOWER Extremity Elevation    Swelling is expected for several months after surgery. This type of swelling is usually associated with gravity and activity, and can be improved with elevation.   The best way to do this is to get your \"toes above your nose\" by laying down and placing several pillows lengthwise under your calf and heel. When elevating your leg keep your knee completely straight. Perform this elevation as often as possible especially for the first two weeks after surgery.     Medication Instructions - Opioid Instructions (0.5 - 1 tablet Q 4-6 hours, MAX 4 tablets)    You were discharged with an opioid medication (hydromorphone, oxycodone, hydrocodone, or tramadol). This medication should only be taken for breakthrough pain that is not controlled with acetaminophen (TYLENOL). If you rate your pain less than 3 you do not need this medication.  Pain rating 0-3:  You do not need this medication  Pain rating 4-6:  Take 1/2 tablet every 4-6 hours as needed  Pain rating 7-10:  Take 1 tablet every " 4-6 hours as needed  Do not exceed 4 tablets per day     NO range of motion     Return to Driving    Return to driving - Driving is NOT permitted until directed by your provider. Under no circumstance are you permitted to drive while using narcotic pain medications.     NO Precautions    No precautions directed by your Provider.  You may perform range of motion activities as tolerated.     NO weight bearing    No weight bearing on your operative extremity.     Splint / Cast    Do NOT remove your splint/cast.  Keep your splint/cast clean and dry.  If your splint/cast gets wet, contact your surgeon team.     Follow Up Care    Please follow-up with Dr. Amaya's team in 2 weeks at [facility]. Call our scheduling line at [phone] to make an appointment, if you do not already have one scheduled.    Advised TCU provider to check CBC, BMP in a week and follow the results.     General info for SNF    Length of Stay Estimate: Short Term Care: Estimated # of Days <30  Condition at Discharge: Improving  Level of care:skilled   Rehabilitation Potential: Fair  Admission H&P remains valid and up-to-date: Yes  Recent Chemotherapy: N/A  Use Nursing Home Standing Orders: Yes     Mantoux instructions    Give two-step Mantoux (PPD) Per Facility Policy Yes     Follow Up and recommended labs and tests    Follow up with Nursing home physician.     Reason for your hospital stay    Patient admitted for ankle fracture underwent surgery.     Glucose monitor nursing POCT    Before meals and at bedtime.  Please call TCU provider if blood sugar less than 70 or more than 300.     Physical Therapy Adult Consult    Evaluate and treat as clinically indicated.    Reason: Ankle surgery     Occupational Therapy Adult Consult    Evaluate and treat as clinically indicated.    Reason: Ankle surgery     Fall precautions     Walker DME    DME Documentation: Describe the reason for need to support medical necessity: Impaired gait due to Foot/Ankle  surgery. Anticipated length of need: 3 months     Discharge Instruction - Regular Diet Adult    Return to your pre-surgery diet unless instructed otherwise     Diet    Follow this diet upon discharge: -Diabetic diet       Examination   Physical Exam   Temp:  [97.4  F (36.3  C)-98.5  F (36.9  C)] 98.5  F (36.9  C)  Pulse:  [85-88] 85  Resp:  [18-20] 18  BP: (107-139)/(53-65) 126/65  SpO2:  [94 %-97 %] 94 %  Wt Readings from Last 1 Encounters:   08/06/23 88.5 kg (195 lb)       Patient seen and examined by this examiner for the first time on the day of discharge.  Notes, labs, imaging report personally reviewed.  Patient's daughter at bedside help with interpretation.  Patient has underlying dementia and cannot provide detailed ROS.  Complaining of left ankle pain but fairly controlled with current pain regimen.  No GI/respiratory/cardiac/ symptoms reported.  Discussed with patient's nurse at bedside.  Discussed with care manager/.      General: Not in obvious distress.  HEENT: Normocephalic, supple neck  Chest: Clear to auscultation bilateral anteriorly, no wheezing  Heart: S1S2 normal, regular  Abdomen: Soft. NT, ND. Bowel sounds- active.  Extremities: Right ankle with Ace wrap  Neuro: Awake, follows simple commands, moves all extremities           Please see EMR for more detailed significant labs, imaging, consultant notes etc.    Rey BERNARD MD, personally saw the patient today and spent greater than 30 minutes discharging this patient.    Rey SNYDER MD  [facility]    CC:Sarah Quijano

## 2023-08-09 NOTE — PROGRESS NOTES
"Orthopedic Progress Note      Assessment: 2 Days Post-Op  S/P Procedure(s):  OPEN REDUCTION INTERNAL FIXATION, FRACTURE, ANKLE, EXAM UNDER ANETHESIA RIGHT FOOT     Plan:   - Continue PT/OT  - Weightbearing status: NWB RLE  - Activity: Up with assist and assistive device until independent.  - Anticoagulation:  PO in addition to SCDs, adam stockings and early ambulation.  - Antibiotics: None  - Pain Management; continue current regimen - utilize oxycodone 2.5 mg as tylenol alone doesn't seem to be controlling pain  - Diet: progress diet as tolerated  - Dressing: Keep dry and intact  - Elevation: Elevate RLE on pillow to keep above the level of the heart as much as possible  - Follow-up: Outpatient follow up in 2 weeks  - Disposition: Anticipate discharge pending work with therapies, medical stability.      Subjective:  Pain: moderate  Nausea, Vomiting:  No  Lightheadedness, Dizziness:  No  Neuro:  Patient denies new onset numbness or paresthesias  Fever, chills: No  Chest pain: No  SOB: No    Patient reports feeling well today. Patient reports pain is tolerable with current pain regimen. Patient eating and drinking well. Patient voiding and passing gas however no BM.  Feels like she could have a BM this morning. All questions/concerns answered.      Objective:  /65 (BP Location: Right arm)   Pulse 85   Temp 98.5  F (36.9  C) (Oral)   Resp 18   Ht 1.499 m (4' 11\")   Wt 88.5 kg (195 lb)   SpO2 94%   BMI 39.39 kg/m      The patient is A&Ox3. Appears comfortable, sitting up in bed  Calves without tenderness, neg Luly's  Brisk capillary refill in the toes.   Pulses not palpable due to splint  Right foot warm & well-perfused.  Sensation is intact to light touch & equal bilaterally in the femoral, DP, SP & tibial nerve distributions.  ROM: Appropriately flexes & extends all toes bilaterally.   Leg lengths equal.  Dressing C/D/I without strikethrough, no surrounding erythema.  Splint in place      No drain "     Pertinent Labs   Lab Results: personally reviewed.   Lab Results   Component Value Date    INR 1.07 08/06/2023    INR 1.04 03/26/2019     Lab Results   Component Value Date    WBC 6.8 08/06/2023    HGB 11.0 (L) 08/09/2023    HCT 50.5 (H) 08/06/2023    MCV 95 08/06/2023     08/06/2023     Lab Results   Component Value Date     03/31/2019    CO2 27 03/31/2019         Report completed by:  DONALD TAVERA PA-C  Date: 08/09/2023  [facility]

## 2023-08-09 NOTE — PLAN OF CARE
"  Problem: Plan of Care - These are the overarching goals to be used throughout the patient stay.    Goal: Plan of Care Review  Description: The Plan of Care Review/Shift note should be completed every shift.  The Outcome Evaluation is a brief statement about your assessment that the patient is improving, declining, or no change.  This information will be displayed automatically on your shift note.  Outcome: Progressing  Goal: Patient-Specific Goal (Individualized)  Description: You can add care plan individualizations to a care plan. Examples of Individualization might be:  \"Parent requests to be called daily at 9am for status\", \"I have a hard time hearing out of my right ear\", or \"Do not touch me to wake me up as it startles me\".  Outcome: Progressing  Goal: Absence of Hospital-Acquired Illness or Injury  Outcome: Progressing  Intervention: Identify and Manage Fall Risk  Recent Flowsheet Documentation  Taken 8/8/2023 1600 by Dru Restrepo RN  Safety Promotion/Fall Prevention: activity supervised  Intervention: Prevent and Manage VTE (Venous Thromboembolism) Risk  Recent Flowsheet Documentation  Taken 8/8/2023 1600 by Dru Restrepo RN  VTE Prevention/Management: SCDs (sequential compression devices) on  Goal: Optimal Comfort and Wellbeing  Outcome: Progressing  Goal: Readiness for Transition of Care  Outcome: Progressing     Problem: Risk for Delirium  Goal: Optimal Coping  Outcome: Progressing  Goal: Improved Behavioral Control  Outcome: Progressing  Intervention: Minimize Safety Risk  Recent Flowsheet Documentation  Taken 8/8/2023 1600 by Dru Restrepo RN  Enhanced Safety Measures: room near unit station  Goal: Improved Attention and Thought Clarity  Outcome: Progressing  Intervention: Maximize Cognitive Function  Recent Flowsheet Documentation  Taken 8/8/2023 1600 by Dru Restrepo RN  Reorientation Measures:   calendar in view   clock in view  Goal: Improved Sleep  Outcome: Progressing   "   Problem: Surgery Nonspecified  Goal: Absence of Bleeding  Outcome: Progressing  Goal: Effective Bowel Elimination  Outcome: Progressing  Goal: Fluid and Electrolyte Balance  Outcome: Progressing  Goal: Blood Glucose Level Within Targeted Range  Outcome: Progressing  Goal: Absence of Infection Signs and Symptoms  Outcome: Progressing  Goal: Anesthesia/Sedation Recovery  Outcome: Progressing  Intervention: Optimize Anesthesia Recovery  Recent Flowsheet Documentation  Taken 8/8/2023 1600 by Dru Restrepo RN  Safety Promotion/Fall Prevention: activity supervised  Reorientation Measures:   calendar in view   clock in view  Goal: Optimal Pain Control and Function  Outcome: Progressing  Goal: Nausea and Vomiting Relief  Outcome: Progressing  Goal: Effective Urinary Elimination  Outcome: Progressing  Goal: Effective Oxygenation and Ventilation  Outcome: Progressing     Problem: Pain Acute  Goal: Optimal Pain Control and Function  Outcome: Progressing  Intervention: Prevent or Manage Pain  Recent Flowsheet Documentation  Taken 8/8/2023 1600 by Dru Restrepo RN  Medication Review/Management: medications reviewed   Goal Outcome Evaluation:       Pt is alert and some confusion, denies pain, daughter was at the bed side with her and helped with interpretation. Dressing is clean dry and intact. daughter brought home food for the pt and ate good amount, VSS, and will continue to monitor

## 2023-08-09 NOTE — PLAN OF CARE
Problem: Plan of Care - These are the overarching goals to be used throughout the patient stay.    Goal: Plan of Care Review  Description: The Plan of Care Review/Shift note should be completed every shift.  The Outcome Evaluation is a brief statement about your assessment that the patient is improving, declining, or no change.  This information will be displayed automatically on your shift note.  Outcome: Progressing   Goal Outcome Evaluation:  Pt ready for discharge to TCU. VSS-pt under control with tylenol. She is disoriented at baseline-per daughter. Spends day in bed or wc at home. Lift to chair here-turned side to side while in bed.  CMS intact to toes-right leg in fiberglass cast elevated on pillows. Tolerating regular diet brought in by daughter. Incontinent of bowel and bladder at baseline. Purewick in place here.Pleasant and cooperative with cares.

## 2023-08-09 NOTE — PLAN OF CARE
"Goal Outcome Evaluation:                        Problem: Plan of Care - These are the overarching goals to be used throughout the patient stay.    Goal: Plan of Care Review  Description: The Plan of Care Review/Shift note should be completed every shift.  The Outcome Evaluation is a brief statement about your assessment that the patient is improving, declining, or no change.  This information will be displayed automatically on your shift note.  Outcome: Progressing  Goal: Patient-Specific Goal (Individualized)  Description: You can add care plan individualizations to a care plan. Examples of Individualization might be:  \"Parent requests to be called daily at 9am for status\", \"I have a hard time hearing out of my right ear\", or \"Do not touch me to wake me up as it startles me\".  Outcome: Progressing  Goal: Absence of Hospital-Acquired Illness or Injury  Outcome: Progressing  Intervention: Identify and Manage Fall Risk  Recent Flowsheet Documentation  Taken 8/9/2023 0200 by Candi Davison RN  Safety Promotion/Fall Prevention: activity supervised  Intervention: Prevent Skin Injury  Recent Flowsheet Documentation  Taken 8/9/2023 0200 by Candi Davison RN  Body Position:   right   turned  Goal: Optimal Comfort and Wellbeing  Outcome: Progressing  Goal: Readiness for Transition of Care  Outcome: Progressing     Problem: Risk for Delirium  Goal: Optimal Coping  Outcome: Progressing  Goal: Improved Behavioral Control  Outcome: Progressing  Intervention: Minimize Safety Risk  Recent Flowsheet Documentation  Taken 8/9/2023 0200 by Candi Davison RN  Enhanced Safety Measures: room near unit station  Goal: Improved Attention and Thought Clarity  Outcome: Progressing  Intervention: Maximize Cognitive Function  Recent Flowsheet Documentation  Taken 8/9/2023 0200 by Candi Davison RN  Reorientation Measures:   calendar in view   clock in view  Goal: Improved Sleep  Outcome: Progressing     Problem: Surgery Nonspecified  Goal: " Absence of Bleeding  Outcome: Progressing  Goal: Effective Bowel Elimination  Outcome: Progressing  Goal: Fluid and Electrolyte Balance  Outcome: Progressing  Goal: Blood Glucose Level Within Targeted Range  Outcome: Progressing  Goal: Absence of Infection Signs and Symptoms  Outcome: Progressing  Goal: Anesthesia/Sedation Recovery  Outcome: Progressing  Intervention: Optimize Anesthesia Recovery  Recent Flowsheet Documentation  Taken 8/9/2023 0200 by Candi Davison RN  Safety Promotion/Fall Prevention: activity supervised  Reorientation Measures:   calendar in view   clock in view  Goal: Optimal Pain Control and Function  Outcome: Progressing  Goal: Nausea and Vomiting Relief  Outcome: Progressing  Goal: Effective Urinary Elimination  Outcome: Progressing  Goal: Effective Oxygenation and Ventilation  Outcome: Progressing  Intervention: Optimize Oxygenation and Ventilation  Recent Flowsheet Documentation  Taken 8/9/2023 0200 by Candi Davison RN  Head of Bed (HOB) Positioning: HOB at 20-30 degrees     Problem: Pain Acute  Goal: Optimal Pain Control and Function  Outcome: Progressing  Intervention: Prevent or Manage Pain  Recent Flowsheet Documentation  Taken 8/9/2023 0200 by Candi Davison RN  Medication Review/Management: medications reviewed

## 2023-08-09 NOTE — PROGRESS NOTES
"[facility]    Medicine Progress Note - Hospitalist Service    Date of Admission:  8/6/2023    Assessment & Plan     79 yo F with h/o DM2, spinal stenosis, and dementia who presented after a fall in the shower with an trimalleolar ankle fracture.  Went to OR 8/7/23 for ORIF with plates/screws.  Pain control, PT/OT, likely will need TCU - ready for discharge when bed available.     Principal Problem:    Trimalleolar fracture of right ankle - as above, pain control, PT/OT     Active Problems:    Anemia acute blood loss post-op: likely initial hgb of 17 was concentrated due to volume depletion but certainly did have some blood loss periop.  Follow hgb.    Type 2 diabetes mellitus with hyperglycemia - follow accuchecks and use sliding scale insulin. Usually takes trulicity at home and metformin, continue metformin and will just follow with sliding scale insulin as her A1c on above regimen is 6.1.      Spinal stenosis    Alzheimer's dementia - may need TCU likely at discharge.  Patient is pleasantly confused.    Dyslipidemia - continue statin as at home    Depression - continue celexa as at home     Diet: Advance Diet as Tolerated: Regular Diet Adult  Discharge Instruction - Regular Diet Adult    DVT Prophylaxis: Enoxaparin (Lovenox) subcutaneous - start in AM  Uribe Catheter: Not present  Lines: None     Cardiac Monitoring: None  Code Status:  full    Clinically Significant Risk Factors                      # Dementia: noted on problem list    # Obesity: Estimated body mass index is 39.39 kg/m  as calculated from the following:    Height as of this encounter: 1.499 m (4' 11\").    Weight as of this encounter: 88.5 kg (195 lb)., PRESENT ON ADMISSION          Disposition Plan      Expected Discharge Date: 08/09/2023    Discharge Delays: PT Disposition recs needed  OT Disposition recs needed              Jaerk Knight MD  Hospitalist Service  [facility]  Securely message with " [employer] (more info)  Text page via [facility] Paging/Directory   ______________________________________________________________________    Interval History   No changes overnight.  Having some pain per nursing    Physical Exam   Vital Signs: Temp: 97.4  F (36.3  C) Temp src: Oral BP: 107/53 Pulse: 85   Resp: 18 SpO2: 95 % O2 Device: None (Room air)    Weight: 195 lbs 0 oz    General Appearance: Elderly F in NAD  Respiratory:  occas rhonchi  Cardiovascular: RRR S1S2  GI: +BS, soft, NT/ND  Skin: no rashes or lesions on exposed areas  Neuro: Alert, generally nonfocal on motor and sensory testing       Medical Decision Making       35 MINUTES SPENT BY ME on the date of service doing chart review, history, exam, documentation & further activities per the note.  Discussed with CM and PT.    Data     I have personally reviewed the following data over the past 24 hrs:    N/A  \   10.5 (L)   / N/A     N/A N/A N/A /  125 (H)   N/A N/A N/A \       Imaging results reviewed over the past 24 hrs:   No results found for this or any previous visit (from the past 24 hour(s)).

## 2023-08-14 ENCOUNTER — TRANSITIONAL CARE UNIT VISIT (OUTPATIENT)
Dept: GERIATRICS | Facility: CLINIC | Age: 78
End: 2023-08-14
Payer: COMMERCIAL

## 2023-08-14 DIAGNOSIS — F02.B18 MODERATE EARLY ONSET ALZHEIMER'S DEMENTIA WITH OTHER BEHAVIORAL DISTURBANCE (H): ICD-10-CM

## 2023-08-14 DIAGNOSIS — S82.851S CLOSED TRIMALLEOLAR FRACTURE OF RIGHT ANKLE, SEQUELA: Primary | ICD-10-CM

## 2023-08-14 DIAGNOSIS — E11.65 TYPE 2 DIABETES MELLITUS WITH HYPERGLYCEMIA, UNSPECIFIED WHETHER LONG TERM INSULIN USE (H): ICD-10-CM

## 2023-08-14 DIAGNOSIS — G30.0 MODERATE EARLY ONSET ALZHEIMER'S DEMENTIA WITH OTHER BEHAVIORAL DISTURBANCE (H): ICD-10-CM

## 2023-08-14 PROCEDURE — 99309 SBSQ NF CARE MODERATE MDM 30: CPT | Performed by: NURSE PRACTITIONER
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
## 2023-08-15 ENCOUNTER — LAB REQUISITION (OUTPATIENT)
Dept: LAB | Facility: CLINIC | Age: 78
End: 2023-08-15
Payer: COMMERCIAL

## 2023-08-15 DIAGNOSIS — E11.65 TYPE 2 DIABETES MELLITUS WITH HYPERGLYCEMIA (H): ICD-10-CM

## 2023-08-16 LAB
ANION GAP SERPL CALCULATED.3IONS-SCNC: 11 MMOL/L (ref 7–15)
BUN SERPL-MCNC: 11.3 MG/DL (ref 8–23)
CALCIUM SERPL-MCNC: 8.8 MG/DL (ref 8.8–10.2)
CHLORIDE SERPL-SCNC: 104 MMOL/L (ref 98–107)
CREAT SERPL-MCNC: 0.63 MG/DL (ref 0.51–0.95)
DEPRECATED HCO3 PLAS-SCNC: 21 MMOL/L (ref 22–29)
ERYTHROCYTE [DISTWIDTH] IN BLOOD BY AUTOMATED COUNT: 12.6 % (ref 10–15)
GFR SERPL CREATININE-BSD FRML MDRD: >90 ML/MIN/1.73M2
GLUCOSE SERPL-MCNC: 103 MG/DL (ref 70–99)
HCT VFR BLD AUTO: 34.3 % (ref 35–47)
HGB BLD-MCNC: 11 G/DL (ref 11.7–15.7)
MCH RBC QN AUTO: 32.1 PG (ref 26.5–33)
MCHC RBC AUTO-ENTMCNC: 32.1 G/DL (ref 31.5–36.5)
MCV RBC AUTO: 100 FL (ref 78–100)
PLATELET # BLD AUTO: 283 10E3/UL (ref 150–450)
POTASSIUM SERPL-SCNC: 4.6 MMOL/L (ref 3.4–5.3)
RBC # BLD AUTO: 3.43 10E6/UL (ref 3.8–5.2)
SODIUM SERPL-SCNC: 136 MMOL/L (ref 136–145)
WBC # BLD AUTO: 6 10E3/UL (ref 4–11)

## 2023-08-16 PROCEDURE — 36415 COLL VENOUS BLD VENIPUNCTURE: CPT | Mod: ORL | Performed by: NURSE PRACTITIONER

## 2023-08-16 PROCEDURE — 80048 BASIC METABOLIC PNL TOTAL CA: CPT | Mod: ORL | Performed by: NURSE PRACTITIONER

## 2023-08-16 PROCEDURE — 85027 COMPLETE CBC AUTOMATED: CPT | Mod: ORL | Performed by: NURSE PRACTITIONER

## 2023-08-16 PROCEDURE — P9603 ONE-WAY ALLOW PRORATED MILES: HCPCS | Mod: ORL | Performed by: NURSE PRACTITIONER

## 2023-08-17 ENCOUNTER — TELEPHONE (OUTPATIENT)
Dept: GERIATRICS | Facility: CLINIC | Age: 78
End: 2023-08-17
Payer: COMMERCIAL

## 2023-08-17 PROCEDURE — 87186 SC STD MICRODIL/AGAR DIL: CPT | Mod: ORL | Performed by: NURSE PRACTITIONER

## 2023-08-17 PROCEDURE — 81001 URINALYSIS AUTO W/SCOPE: CPT | Mod: ORL | Performed by: NURSE PRACTITIONER

## 2023-08-17 NOTE — TELEPHONE ENCOUNTER
[facility] Triage Nurse Telephone Encounter    Provider: MEET Thomas CNP   Facility: [facility] Facility Type:  TCU    Caller: Chris  Call Back Number: [phone]    Allergies:  No Known Allergies     Reason for call: Facility requesting Tylenol be scheduled instead of just PRN for pain control. Also reporting urinary frequency and urgency. When asked about further symptoms, pt felt insulted and would not answer.    Verbal Order/Direction given by Provider:   1) Tylenol 650 mg PO TID  2) Obtain UA/UC    Provider giving Order:  MEET Thomas CNP     Verbal Order given to: Chris Tovar RN

## 2023-08-18 ENCOUNTER — LAB REQUISITION (OUTPATIENT)
Dept: LAB | Facility: CLINIC | Age: 78
End: 2023-08-18
Payer: COMMERCIAL

## 2023-08-18 DIAGNOSIS — N39.0 URINARY TRACT INFECTION, SITE NOT SPECIFIED: ICD-10-CM

## 2023-08-18 LAB
ALBUMIN UR-MCNC: 50 MG/DL
APPEARANCE UR: ABNORMAL
BACTERIA #/AREA URNS HPF: ABNORMAL /HPF
BILIRUB UR QL STRIP: NEGATIVE
CAOX CRY #/AREA URNS HPF: ABNORMAL /HPF
COLOR UR AUTO: YELLOW
GLUCOSE UR STRIP-MCNC: NEGATIVE MG/DL
HGB UR QL STRIP: NEGATIVE
HYALINE CASTS: 9 /LPF
KETONES UR STRIP-MCNC: NEGATIVE MG/DL
LEUKOCYTE ESTERASE UR QL STRIP: ABNORMAL
MUCOUS THREADS #/AREA URNS LPF: PRESENT /LPF
NITRATE UR QL: POSITIVE
PH UR STRIP: 5.5 [PH] (ref 5–7)
RBC URINE: 1 /HPF
SP GR UR STRIP: 1.03 (ref 1–1.03)
SQUAMOUS EPITHELIAL: 9 /HPF
UROBILINOGEN UR STRIP-MCNC: NORMAL MG/DL
WBC URINE: 18 /HPF

## 2023-08-19 LAB — BACTERIA UR CULT: ABNORMAL

## 2023-08-21 ENCOUNTER — TRANSITIONAL CARE UNIT VISIT (OUTPATIENT)
Dept: GERIATRICS | Facility: CLINIC | Age: 78
End: 2023-08-21
Payer: COMMERCIAL

## 2023-08-21 VITALS
RESPIRATION RATE: 18 BRPM | WEIGHT: 186.2 LBS | SYSTOLIC BLOOD PRESSURE: 112 MMHG | HEART RATE: 67 BPM | TEMPERATURE: 97.7 F | OXYGEN SATURATION: 95 % | DIASTOLIC BLOOD PRESSURE: 65 MMHG | BODY MASS INDEX: 37.54 KG/M2 | HEIGHT: 59 IN

## 2023-08-21 DIAGNOSIS — Z16.12 URINARY TRACT INFECTION DUE TO EXTENDED-SPECTRUM BETA LACTAMASE (ESBL) PRODUCING ESCHERICHIA COLI: Primary | ICD-10-CM

## 2023-08-21 DIAGNOSIS — N39.0 URINARY TRACT INFECTION DUE TO EXTENDED-SPECTRUM BETA LACTAMASE (ESBL) PRODUCING ESCHERICHIA COLI: Primary | ICD-10-CM

## 2023-08-21 DIAGNOSIS — E11.65 TYPE 2 DIABETES MELLITUS WITH HYPERGLYCEMIA, UNSPECIFIED WHETHER LONG TERM INSULIN USE (H): ICD-10-CM

## 2023-08-21 DIAGNOSIS — F02.B18 MODERATE EARLY ONSET ALZHEIMER'S DEMENTIA WITH OTHER BEHAVIORAL DISTURBANCE (H): ICD-10-CM

## 2023-08-21 DIAGNOSIS — G30.0 MODERATE EARLY ONSET ALZHEIMER'S DEMENTIA WITH OTHER BEHAVIORAL DISTURBANCE (H): ICD-10-CM

## 2023-08-21 DIAGNOSIS — B96.29 URINARY TRACT INFECTION DUE TO EXTENDED-SPECTRUM BETA LACTAMASE (ESBL) PRODUCING ESCHERICHIA COLI: Primary | ICD-10-CM

## 2023-08-21 DIAGNOSIS — S82.851S CLOSED TRIMALLEOLAR FRACTURE OF RIGHT ANKLE, SEQUELA: ICD-10-CM

## 2023-08-21 PROCEDURE — 99309 SBSQ NF CARE MODERATE MDM 30: CPT | Performed by: NURSE PRACTITIONER

## 2023-08-21 RX ORDER — NITROFURANTOIN 25; 75 MG/1; MG/1
100 CAPSULE ORAL 2 TIMES DAILY
Qty: 28 CAPSULE | Refills: 0
Start: 2023-08-21 | End: 2023-09-04

## 2023-08-21 NOTE — LETTER
"    8/21/2023        RE: Trinidad Brown  [address]  [place] [address]        [facility] GERIATRICS    Chief Complaint   Patient presents with     RECHECK     HPI:  Trinidad Brown is a 78 year old  (1945), who is being seen today for an episodic care visit at: [facility] ([facility]) [[id number]]. [facility] stay 8/6/23 through 8/9/23. Patient with PMH dementia, DM2, and spinal stenosis presented after a fall at home in the shower. She was found to have a right trimalleolar ankle fracture. On 8/7/23 she underwent ORIF. Post-op course uncomplicated.     Today's concern is:   Patient has been frequently agitated, takes herself to the bathroom, cannot follow her weight bearing restriction, has very poor memory. UA/UC was sent due to staff noting very frequent trips to the bathroom. She has had UTIs occasionally in the past. Culture came back with >100,000 ESBL producing E. Coli. Patient is seen with phone . She is again calm and pleasant with provider but is not a reliable historian.     Allergies, and PMH/PSH reviewed in EPIC today.  REVIEW OF SYSTEMS:  Limited secondary to cognitive impairment but today pt reports and 4 point ROS including Respiratory, CV, GI and , other than that noted in the HPI,  is negative    Objective:   /65   Pulse 67   Temp 97.7  F (36.5  C)   Resp 18   Ht 1.499 m (4' 11\")   Wt 84.5 kg (186 lb 3.2 oz)   SpO2 95%   BMI 37.61 kg/m    GENERAL APPEARANCE:  Laying bed, arouses easily, no apparent distress  EYES:  EOM normal, conjunctiva and lids normal  RESP:  no respiratory distress  M/S:   right lower leg in padding and ace wrap  PSYCH:  insight and judgement impaired, memory impaired     Recent labs in EPIC reviewed by me today.     Assessment/Plan:  (N39.0,  B96.29,  Z16.12) Urinary tract infection due to extended-spectrum beta lactamase (ESBL) producing Escherichia coli  (primary encounter diagnosis)  Comment: Per chart review, she " does not have a history of ESBL. Would consider this an acute infection and not a carrier. She likely had a catheter during her hospitalization, could be related to that. Her frequent trips to bathroom certainly put more stress on her ankle. This could worsen her cognitive status as well.   Plan: Macrobid 100mg BID x 14 days    (S82.851S) Closed trimalleolar fracture of right ankle, sequela  Comment: s/p ORIF. Pain controlled. No s/sx DVT. No s/sx poor wound healing. Goal is to discharge home when PT/OT goals met. This will be very challenging as long as she is NWB. Expect she will need to stay in TCU for several weeks.   Plan: PT/OT eval and treat, discharge planning per their recommendation. Continue ASA, monitor s/sx DVT. Continue oxycodone prn for pain, monitor pain severity. F/u ortho as scheduled.    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 Sitting in a wheelchair by the nursing station all day is not recommended as this will cause too much swelling and pressure on her ankle. She does seem to be calming down some.   Plan: Continue current POC with no changes at this time and adjustments as needed.    (E11.65) Type 2 diabetes mellitus with hyperglycemia, unspecified whether long term insulin use (H)  Comment: Blood sugars improved, now 100-180  Plan: Continue current POC with no changes at this time and adjustments as needed.      Orders:  Macrobid 100mg BID x 14 days    Electronically signed by: MEET Mg CNP           Sincerely,        MEET Mg CNP

## 2023-08-21 NOTE — PROGRESS NOTES
"[facility] GERIATRICS    Chief Complaint   Patient presents with    RECHECK     HPI:  Trinidad Brown is a 78 year old  (1945), who is being seen today for an episodic care visit at: [facility] ([facility]) [[id number]]. [facility] stay 8/6/23 through 8/9/23. Patient with PMH dementia, DM2, and spinal stenosis presented after a fall at home in the shower. She was found to have a right trimalleolar ankle fracture. On 8/7/23 she underwent ORIF. Post-op course uncomplicated.     Today's concern is:   Patient has been frequently agitated, takes herself to the bathroom, cannot follow her weight bearing restriction, has very poor memory. UA/UC was sent due to staff noting very frequent trips to the bathroom. She has had UTIs occasionally in the past. Culture came back with >100,000 ESBL producing E. Coli. Patient is seen with phone . She is again calm and pleasant with provider but is not a reliable historian.     Allergies, and PMH/PSH reviewed in EPIC today.  REVIEW OF SYSTEMS:  Limited secondary to cognitive impairment but today pt reports and 4 point ROS including Respiratory, CV, GI and , other than that noted in the HPI,  is negative    Objective:   /65   Pulse 67   Temp 97.7  F (36.5  C)   Resp 18   Ht 1.499 m (4' 11\")   Wt 84.5 kg (186 lb 3.2 oz)   SpO2 95%   BMI 37.61 kg/m    GENERAL APPEARANCE:  Laying bed, arouses easily, no apparent distress  EYES:  EOM normal, conjunctiva and lids normal  RESP:  no respiratory distress  M/S:   right lower leg in padding and ace wrap  PSYCH:  insight and judgement impaired, memory impaired     Recent labs in EPIC reviewed by me today.     Assessment/Plan:  (N39.0,  B96.29,  Z16.12) Urinary tract infection due to extended-spectrum beta lactamase (ESBL) producing Escherichia coli  (primary encounter diagnosis)  Comment: Per chart review, she does not have a history of ESBL. Would consider this an acute infection and not a " carrier. She likely had a catheter during her hospitalization, could be related to that. Her frequent trips to bathroom certainly put more stress on her ankle. This could worsen her cognitive status as well.   Plan: Macrobid 100mg BID x 14 days    (S82.851S) Closed trimalleolar fracture of right ankle, sequela  Comment: s/p ORIF. Pain controlled. No s/sx DVT. No s/sx poor wound healing. Goal is to discharge home when PT/OT goals met. This will be very challenging as long as she is NWB. Expect she will need to stay in TCU for several weeks.   Plan: PT/OT eval and treat, discharge planning per their recommendation. Continue ASA, monitor s/sx DVT. Continue oxycodone prn for pain, monitor pain severity. F/u ortho as scheduled.    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 Sitting in a wheelchair by the nursing station all day is not recommended as this will cause too much swelling and pressure on her ankle. She does seem to be calming down some.   Plan: Continue current POC with no changes at this time and adjustments as needed.    (E11.65) Type 2 diabetes mellitus with hyperglycemia, unspecified whether long term insulin use (H)  Comment: Blood sugars improved, now 100-180  Plan: Continue current POC with no changes at this time and adjustments as needed.      Orders:  Macrobid 100mg BID x 14 days    Electronically signed by: MEET Mg CNP

## 2023-08-24 ENCOUNTER — TELEPHONE (OUTPATIENT)
Dept: GERIATRICS | Facility: CLINIC | Age: 78
End: 2023-08-24
Payer: COMMERCIAL

## 2023-08-24 NOTE — PROGRESS NOTES
[facility] GERIATRICS  INITIAL VISIT NOTE  August 25, 2023      PRIMARY CARE PROVIDER AND CLINIC:  Sarah Quijano [facility] [address] / [facility] [address]    [facility] Medical Record Number:  [id number]  Place of Service where encounter took place:  [facility] ([facility]) [[id number]]    Chief Complaint   Patient presents with    Hospital F/U       HPI:    Trinidad Brown is a 78 year old  (1945) female seen today at [facility]. Medical history is notable for DM II, spinal stenosis and dementia. She was hospitalized at [facility] from 8/6/23 to 8/9/23 where she presented after a fall and was found to have a R ankle trimalleolar fracture s/p ORIF (8/7/23). She was admitted to this facility for  rehab, medical management, and nursing care.      History obtained from: facility chart records, facility staff, and [facility] chart review.      At [facility] was found to have a UTI, currently on antibiotics.     Today, Ms. Brown is seen in her room laying in her bed. She is a limited historian due ot dementia (BIMS 4/15). I did not have an . Talked with nursing - she tried to leave last week. Pain seems controlled.     CODE STATUS: CPR/Full code     ALLERGIES:  No Known Allergies    PAST MEDICAL HISTORY:   Past Medical History:   Diagnosis Date    Alzheimer's dementia (H) 08/06/2023    Spinal stenosis 03/25/2019    Type 2 diabetes mellitus with hyperglycemia (H) 12/09/2015       PAST SURGICAL HISTORY:   Past Surgical History:   Procedure Laterality Date    BACK SURGERY      tumor removal    LUMBAR LAMINECTOMY Bilateral 3/29/2019    Procedure: Lumbar 3- Lumbar-4, Lumbar 4-Lumbar -5, Lumbar 5-Sacral 1,  decompressive laminectomy.;  Surgeon: Naeem Thomas MD;  Location: [facility] Main OR;  Service: Spine    OPEN REDUCTION INTERNAL FIXATION ANKLE Right 8/7/2023    Procedure: OPEN REDUCTION INTERNAL FIXATION, FRACTURE, ANKLE, EXAM UNDER ANETHESIA RIGHT FOOT;  Surgeon: Jose Luis  "Jatin Llanes MD;  Location: [facility] Main OR       FAMILY HISTORY:   Family History   Problem Relation Age of Onset    Cerebrovascular Disease Daughter     Hypertension Daughter     No Known Problems Son        MEDICATIONS:  Post Discharge Medication Reconciliation Status: discharge medications reconciled and changed, per note/orders.  Current Outpatient Medications   Medication Sig Dispense Refill    acetaminophen (TYLENOL) 325 MG tablet Take 2 tablets (650 mg) by mouth every 4 hours as needed for mild pain (Patient taking differently: Take 650 mg by mouth 3 times daily And q4h PRN) 60 tablet 0    aspirin (ASA) 325 MG EC tablet Take 1 tablet (325 mg) by mouth daily 30 tablet 0    atorvastatin (LIPITOR) 40 MG tablet [ATORVASTATIN (LIPITOR) 40 MG TABLET] Take 40 mg by mouth at bedtime.      citalopram (CELEXA) 10 MG tablet Take 10 mg by mouth daily      dulaglutide (TRULICITY) 0.75 MG/0.5ML pen Inject 0.75 mg Subcutaneous every 7 days      metFORMIN (GLUCOPHAGE) 1000 MG tablet [METFORMIN (GLUCOPHAGE) 1000 MG TABLET] Take 1,000 mg by mouth 2 (two) times a day with meals.      nitroFURantoin macrocrystal-monohydrate (MACROBID) 100 MG capsule Take 1 capsule (100 mg) by mouth 2 times daily for 14 days 28 capsule 0    oxyCODONE (ROXICODONE) 5 MG tablet Take 0.5 tablets (2.5 mg) by mouth every 4 hours as needed for moderate pain 30 tablet 0    senna-docusate (SENOKOT-S/PERICOLACE) 8.6-50 MG tablet Take 2 tablets by mouth 2 times daily 30 tablet 0       ROS:  Unable to obtain due to cognitive impairment or aphasia. BIMS 4/15.     PHYSICAL EXAM:  /61   Pulse 82   Temp 97.9  F (36.6  C)   Resp 18   Ht 1.499 m (4' 11\")   Wt 84.5 kg (186 lb 3.2 oz)   SpO2 97%   BMI 37.61 kg/m    Gen: laying in bed, alert and in no acute distress  Resp: breathing non labored, no tachypnea   Ext: no LLE edema; RLE in purple short leg fiberglass cast   Neuro: CX II-XII grossly in tact; ROM in all four extremities grossly in " tact  Psych: memory, judgement and insight impaired.       LABORATORY/IMAGING DATA:  Reviewed as per [facility] and/or [facility]    ASSESSMENT/PLAN:    Right Ankle Fracture s/p ORIF (8/7/23)  Secondary to a fall  -- NWB  -- analgesia with APAP 650 mg TID and q4h PRN, oxycodone 2.5 mg q4h PRN   -- DVT ppx with  mg daily per ortho  -- PT/OT  -- follow up with ortho as scheduled     E Coli UTI   Afebrile  -- completing course of nitrofurantoin  -- follow for clinical signs of infection     DM, Type II  Hgb A1c 6.1. Sugars 110s-180s, most <150 (am), 120-210 (most <160),  - labile (lynsey).   -- metformin 1000 mg BID  -- dulaglutide 0.75 mg subQ weekly     Dementia Without Behavioral Disturbance  Mild Major Recurrent Depression  [facility] 4/15  -- citalopram 10 mg daily  -- OT following    HLD  -- atorvastatin 40 mg daily     Slow Transit Constipation  -- Senna-S 2 tabs BID   -- adjust bowel regimen as needed    Physical Deconditioning  In setting of hospitalization and underlying medical conditions  -- ongoing PT/OT      Electronically signed by:  Abeba Anderson MD

## 2023-08-24 NOTE — TELEPHONE ENCOUNTER
[facility] GERIATRIC SERVICES NON-EMERGENT ENCOUNTER    Trinidad Brown is a 78 year old  (1945), call received today regarding:   Unwitnessed fall    Disposition  Nurse called to report that patient was heard yelling for help from her room.  Staff noted patient to be sitting on the floor next to the toilet.  Patient self transferred to the bathroom by self which nurse stated is one of patient's behaviors.  Patient is to be non-weight bearing due to recent surgery.  No injuries noted.  ROM intact.  Patient denies head strike. VS: 128/71, 97, 18, 97.4, and O2 96% on RA.  Facility will continue to monitor per protocol.       Requests  FYI      Electronically signed by:   Marti Montemayor RN

## 2023-08-25 ENCOUNTER — TRANSITIONAL CARE UNIT VISIT (OUTPATIENT)
Dept: GERIATRICS | Facility: CLINIC | Age: 78
End: 2023-08-25
Payer: COMMERCIAL

## 2023-08-25 VITALS
WEIGHT: 186.2 LBS | DIASTOLIC BLOOD PRESSURE: 61 MMHG | HEIGHT: 59 IN | HEART RATE: 82 BPM | SYSTOLIC BLOOD PRESSURE: 106 MMHG | OXYGEN SATURATION: 97 % | RESPIRATION RATE: 18 BRPM | BODY MASS INDEX: 37.54 KG/M2 | TEMPERATURE: 97.9 F

## 2023-08-25 DIAGNOSIS — K59.01 SLOW TRANSIT CONSTIPATION: ICD-10-CM

## 2023-08-25 DIAGNOSIS — S82.851S CLOSED TRIMALLEOLAR FRACTURE OF RIGHT ANKLE, SEQUELA: Primary | ICD-10-CM

## 2023-08-25 DIAGNOSIS — E11.65 TYPE 2 DIABETES MELLITUS WITH HYPERGLYCEMIA, UNSPECIFIED WHETHER LONG TERM INSULIN USE (H): ICD-10-CM

## 2023-08-25 DIAGNOSIS — R53.81 PHYSICAL DECONDITIONING: ICD-10-CM

## 2023-08-25 DIAGNOSIS — F03.90 DEMENTIA WITHOUT BEHAVIORAL DISTURBANCE, PSYCHOTIC DISTURBANCE, MOOD DISTURBANCE, OR ANXIETY, UNSPECIFIED DEMENTIA SEVERITY, UNSPECIFIED DEMENTIA TYPE (H): ICD-10-CM
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
## 2023-08-25 DIAGNOSIS — F33.0 MILD RECURRENT MAJOR DEPRESSION (H): ICD-10-CM

## 2023-08-25 PROCEDURE — 99305 1ST NF CARE MODERATE MDM 35: CPT | Performed by: INTERNAL MEDICINE

## 2023-08-25 NOTE — LETTER
8/25/2023        RE: Trinidad Brown  [address]  [place] [address]        [facility] GERIATRICS  INITIAL VISIT NOTE  August 25, 2023      PRIMARY CARE PROVIDER AND CLINIC:  Quijano, Sarah [facility] [address] / [place] [address]    [facility] Medical Record Number:  [id number]  Place of Service where encounter took place:  [facility] ([facility]) [[id number]]    Chief Complaint   Patient presents with     Hospital F/U       HPI:    Trinidad Brown is a 78 year old  (1945) female seen today at [facility]. Medical history is notable for DM II, spinal stenosis and dementia. She was hospitalized at [facility] from 8/6/23 to 8/9/23 where she presented after a fall and was found to have a R ankle trimalleolar fracture s/p ORIF (8/7/23). She was admitted to this facility for  rehab, medical management, and nursing care.      History obtained from: facility chart records, facility staff, and [facility] chart review.      At [facility] was found to have a UTI, currently on antibiotics.     Today, Ms. Brown is seen in her room laying in her bed. She is a limited historian due ot dementia (BIMS 4/15). I did not have an . Talked with nursing - she tried to leave last week. Pain seems controlled.     CODE STATUS: CPR/Full code     ALLERGIES:  No Known Allergies    PAST MEDICAL HISTORY:   Past Medical History:   Diagnosis Date     Alzheimer's dementia (H) 08/06/2023     Spinal stenosis 03/25/2019     Type 2 diabetes mellitus with hyperglycemia (H) 12/09/2015       PAST SURGICAL HISTORY:   Past Surgical History:   Procedure Laterality Date     BACK SURGERY      tumor removal     LUMBAR LAMINECTOMY Bilateral 3/29/2019    Procedure: Lumbar 3- Lumbar-4, Lumbar 4-Lumbar -5, Lumbar 5-Sacral 1,  decompressive laminectomy.;  Surgeon: Naeem Thomas MD;  Location: [facility] Main OR;  Service: Spine     OPEN REDUCTION INTERNAL FIXATION ANKLE Right 8/7/2023    Procedure: OPEN REDUCTION  "INTERNAL FIXATION, FRACTURE, ANKLE, EXAM UNDER ANETHESIA RIGHT FOOT;  Surgeon: Jatin Amaya MD;  Location: [facility] Main OR       FAMILY HISTORY:   Family History   Problem Relation Age of Onset     Cerebrovascular Disease Daughter      Hypertension Daughter      No Known Problems Son        MEDICATIONS:  Post Discharge Medication Reconciliation Status: discharge medications reconciled and changed, per note/orders.  Current Outpatient Medications   Medication Sig Dispense Refill     acetaminophen (TYLENOL) 325 MG tablet Take 2 tablets (650 mg) by mouth every 4 hours as needed for mild pain (Patient taking differently: Take 650 mg by mouth 3 times daily And q4h PRN) 60 tablet 0     aspirin (ASA) 325 MG EC tablet Take 1 tablet (325 mg) by mouth daily 30 tablet 0     atorvastatin (LIPITOR) 40 MG tablet [ATORVASTATIN (LIPITOR) 40 MG TABLET] Take 40 mg by mouth at bedtime.       citalopram (CELEXA) 10 MG tablet Take 10 mg by mouth daily       dulaglutide (TRULICITY) 0.75 MG/0.5ML pen Inject 0.75 mg Subcutaneous every 7 days       metFORMIN (GLUCOPHAGE) 1000 MG tablet [METFORMIN (GLUCOPHAGE) 1000 MG TABLET] Take 1,000 mg by mouth 2 (two) times a day with meals.       nitroFURantoin macrocrystal-monohydrate (MACROBID) 100 MG capsule Take 1 capsule (100 mg) by mouth 2 times daily for 14 days 28 capsule 0     oxyCODONE (ROXICODONE) 5 MG tablet Take 0.5 tablets (2.5 mg) by mouth every 4 hours as needed for moderate pain 30 tablet 0     senna-docusate (SENOKOT-S/PERICOLACE) 8.6-50 MG tablet Take 2 tablets by mouth 2 times daily 30 tablet 0       ROS:  Unable to obtain due to cognitive impairment or aphasia. BIMS 4/15.     PHYSICAL EXAM:  /61   Pulse 82   Temp 97.9  F (36.6  C)   Resp 18   Ht 1.499 m (4' 11\")   Wt 84.5 kg (186 lb 3.2 oz)   SpO2 97%   BMI 37.61 kg/m    Gen: laying in bed, alert and in no acute distress  Resp: breathing non labored, no tachypnea   Ext: no LLE edema; RLE in purple short leg " fiberglass cast   Neuro: CX II-XII grossly in tact; ROM in all four extremities grossly in tact  Psych: memory, judgement and insight impaired.       LABORATORY/IMAGING DATA:  Reviewed as per [facility] and/or [facility]    ASSESSMENT/PLAN:    Right Ankle Fracture s/p ORIF (8/7/23)  Secondary to a fall  -- NWB  -- analgesia with APAP 650 mg TID and q4h PRN, oxycodone 2.5 mg q4h PRN   -- DVT ppx with  mg daily per ortho  -- PT/OT  -- follow up with ortho as scheduled     E Coli UTI   Afebrile  -- completing course of nitrofurantoin  -- follow for clinical signs of infection     DM, Type II  Hgb A1c 6.1. Sugars 110s-180s, most <150 (am), 120-210 (most <160),  - labile (lynsey).   -- metformin 1000 mg BID  -- dulaglutide 0.75 mg subQ weekly     Dementia Without Behavioral Disturbance  Mild Major Recurrent Depression  [facility] 4/15  -- citalopram 10 mg daily  -- OT following    HLD  -- atorvastatin 40 mg daily     Slow Transit Constipation  -- Senna-S 2 tabs BID   -- adjust bowel regimen as needed    Physical Deconditioning  In setting of hospitalization and underlying medical conditions  -- ongoing PT/OT      Electronically signed by:  Abeba Anderson MD                          Sincerely,        Abeba Anderson MD

## 2023-09-01 ENCOUNTER — TRANSITIONAL CARE UNIT VISIT (OUTPATIENT)
Dept: GERIATRICS | Facility: CLINIC | Age: 78
End: 2023-09-01
Payer: COMMERCIAL

## 2023-09-01 DIAGNOSIS — F03.C11 SEVERE DEMENTIA WITH AGITATION, UNSPECIFIED DEMENTIA TYPE (H): ICD-10-CM

## 2023-09-01 DIAGNOSIS — S82.851S: Primary | ICD-10-CM

## 2023-09-01 PROCEDURE — 99309 SBSQ NF CARE MODERATE MDM 30: CPT | Performed by: NURSE PRACTITIONER

## 2023-09-01 RX ORDER — AMOXICILLIN 250 MG
2 CAPSULE ORAL DAILY PRN
Start: 2023-09-01 | End: 2023-11-06

## 2023-09-01 NOTE — LETTER
9/1/2023        RE: Trinidad Brown  [address]  [place] [address]        [facility] GERIATRICS    Chief Complaint   Patient presents with     RECHECK     HPI:  Trinidad Brown is a 78 year old  (1945), who is being seen today for an episodic care visit at: [facility] ([facility]) [[id number]]. [facility] stay 8/6/23 through 8/9/23. Patient with PMH dementia, DM2, and spinal stenosis presented after a fall at home in the shower. She was found to have a right trimalleolar ankle fracture. On 8/7/23 she underwent ORIF. Post-op course uncomplicated.     Today's concern is:   Patient is not a reliable historian. She does not remember why she is at [facility]. When reminded and advised of her restrictions, she says she understands. She does not have pain right now. Nursing reports that she continues to have aggressive behavior at times, refusing cares, even hitting staff. She frequently tries to self-transfer. She has had at least 2 falls since admission. They are wondering if she could benefit from antipsychotic medication.     Allergies, and PMH/PSH reviewed in EPIC today.  REVIEW OF SYSTEMS:  Limited secondary to cognitive impairment but today pt reports 4 point ROS including Respiratory, CV, GI and , other than that noted in the HPI,  is negative    Objective:   GENERAL APPEARANCE:  Alert, in no distress  RESP:  no respiratory distress  PSYCH:  insight and judgement impaired, memory impaired , affect and mood normal      Assessment/Plan:  (O02.383M) Fracture of ankle, trimalleolar, right, closed, sequela  (primary encounter diagnosis)  Comment: Rehab has been challenging due to dementia and NWB orders. She cannot follow the NWB, so staff have no choice but to use a Isabela for transfers, which she refuses. Hopefully her fracture is still healing well as it will be much safer for her once she can bear weight.   Plan: Continue current POC with no changes at this time and adjustments as  needed.    (F03.C11) Severe dementia with agitation, unspecified dementia type (H)  Comment: Patient does have behaviors that are indicative of emotional distress, however starting antipsychotic medications could increase her fall risk. Given that she had 2 falls in one week, would prefer to avoid increasing her risk further. It is not clear from the charting if her behavior is continuous throughout the day or only occasional. Staff will try charting more clearly and will reassess next week        Electronically signed by: MEET Mg CNP           Sincerely,        MEET Mg CNP

## 2023-09-01 NOTE — PROGRESS NOTES
[facility] GERIATRICS    Chief Complaint   Patient presents with    RECHECK     HPI:  Trinidad Brown is a 78 year old  (1945), who is being seen today for an episodic care visit at: [facility] ([facility]) [[id number]]. [facility] stay 8/6/23 through 8/9/23. Patient with PMH dementia, DM2, and spinal stenosis presented after a fall at home in the shower. She was found to have a right trimalleolar ankle fracture. On 8/7/23 she underwent ORIF. Post-op course uncomplicated.     Today's concern is:   Patient is not a reliable historian. She does not remember why she is at [facility]. When reminded and advised of her restrictions, she says she understands. She does not have pain right now. Nursing reports that she continues to have aggressive behavior at times, refusing cares, even hitting staff. She frequently tries to self-transfer. She has had at least 2 falls since admission. They are wondering if she could benefit from antipsychotic medication.     Allergies, and PMH/PSH reviewed in EPIC today.  REVIEW OF SYSTEMS:  Limited secondary to cognitive impairment but today pt reports 4 point ROS including Respiratory, CV, GI and , other than that noted in the HPI,  is negative    Objective:   GENERAL APPEARANCE:  Alert, in no distress  RESP:  no respiratory distress  PSYCH:  insight and judgement impaired, memory impaired , affect and mood normal      Assessment/Plan:  (J93.308L) Fracture of ankle, trimalleolar, right, closed, sequela  (primary encounter diagnosis)  Comment: Rehab has been challenging due to dementia and NWB orders. She cannot follow the NWB, so staff have no choice but to use a Isabela for transfers, which she refuses. Hopefully her fracture is still healing well as it will be much safer for her once she can bear weight.   Plan: Continue current POC with no changes at this time and adjustments as needed.    (F03.C11) Severe dementia with agitation, unspecified dementia type  (H)  Comment: Patient does have behaviors that are indicative of emotional distress, however starting antipsychotic medications could increase her fall risk. Given that she had 2 falls in one week, would prefer to avoid increasing her risk further. It is not clear from the charting if her behavior is continuous throughout the day or only occasional. Staff will try charting more clearly and will reassess next week        Electronically signed by: MEET Mg CNP

## 2023-09-27 ENCOUNTER — TRANSITIONAL CARE UNIT VISIT (OUTPATIENT)
Dept: GERIATRICS | Facility: CLINIC | Age: 78
End: 2023-09-27
Payer: COMMERCIAL

## 2023-09-27 VITALS
HEIGHT: 59 IN | DIASTOLIC BLOOD PRESSURE: 78 MMHG | WEIGHT: 185.4 LBS | BODY MASS INDEX: 37.38 KG/M2 | RESPIRATION RATE: 18 BRPM | SYSTOLIC BLOOD PRESSURE: 127 MMHG | HEART RATE: 62 BPM | TEMPERATURE: 97 F | OXYGEN SATURATION: 96 %

## 2023-09-27 DIAGNOSIS — E11.9 TYPE 2 DIABETES MELLITUS WITHOUT COMPLICATION, WITHOUT LONG-TERM CURRENT USE OF INSULIN (H): ICD-10-CM

## 2023-09-27 DIAGNOSIS — S82.851S: Primary | ICD-10-CM

## 2023-09-27 DIAGNOSIS — F03.C11 SEVERE DEMENTIA WITH AGITATION, UNSPECIFIED DEMENTIA TYPE (H): ICD-10-CM

## 2023-09-27 PROCEDURE — 99309 SBSQ NF CARE MODERATE MDM 30: CPT | Performed by: NURSE PRACTITIONER

## 2023-09-27 NOTE — PROGRESS NOTES
"[facility] GERIATRICS    Chief Complaint   Patient presents with    RECHECK     HPI:  Trinidad Brown is a 78 year old  (1945), who is being seen today for an episodic care visit at: [facility] ([facility]) [[id number]]. [facility] stay 8/6/23 through 8/9/23. Patient with PMH dementia, DM2, and spinal stenosis presented after a fall at home in the shower. She was found to have a right trimalleolar ankle fracture. On 8/7/23 she underwent ORIF. Post-op course uncomplicated.     Today's concern is:   Patient's behavioral issues have generally improved. She has not fallen recently. She will smile and wave at staff. Staff reported that she is refusing blood sugar checks, not eating well, having mostly sweets. There are BG documented QID, they run 100-110s. She remains on a therapy hold due to NWB. Next ortho appointment is 10/4/23    Allergies, and PMH/PSH reviewed in [facility] today.  REVIEW OF SYSTEMS:  Limited secondary to cognitive impairment but today pt reports 4 point ROS including Respiratory, CV, GI and , other than that noted in the HPI,  is negative    Objective:   /78   Pulse 62   Temp 97  F (36.1  C)   Resp 18   Ht 1.499 m (4' 11\")   Wt 84.1 kg (185 lb 6.4 oz)   SpO2 96%   BMI 37.45 kg/m    GENERAL APPEARANCE:  Alert, in no distress  RESP:  no respiratory distress  PSYCH:  insight and judgement impaired, memory impaired       Assessment/Plan:  (S82.801S) Fracture of ankle, trimalleolar, right, closed, sequela  (primary encounter diagnosis)  Comment: Patient is unable to maintain NWB due to dementia, therefore it was not safe to continue attempting therapy.   Plan: PT/OT eval and treat once WBAT, discharge planning per their recommendation. F/u ortho as scheduled.    (F03.C11) Severe dementia with agitation, unspecified dementia type (H)  Comment: Emotional distress and agitation has improved, likely due to settling in here  Plan: Continue current POC with no changes " at this time and adjustments as needed.    (E11.9) Type 2 diabetes mellitus without complication, without long-term current use of insulin (H)  Comment: Well controlled. Will reduce frequency of BG checks      Orders:  Change BGM to BID on M,W,F    Electronically signed by: MEET Mg CNP

## 2023-09-27 NOTE — LETTER
"    9/27/2023        RE: Trinidad Brown  [address]  [place] [address]        [facility] GERIATRICS    Chief Complaint   Patient presents with     RECHECK     HPI:  Trinidad Brown is a 78 year old  (1945), who is being seen today for an episodic care visit at: [facility] ([facility]) [[id number]]. [facility] stay 8/6/23 through 8/9/23. Patient with PMH dementia, DM2, and spinal stenosis presented after a fall at home in the shower. She was found to have a right trimalleolar ankle fracture. On 8/7/23 she underwent ORIF. Post-op course uncomplicated.     Today's concern is:   Patient's behavioral issues have generally improved. She has not fallen recently. She will smile and wave at staff. Staff reported that she is refusing blood sugar checks, not eating well, having mostly sweets. There are BG documented QID, they run 100-110s. She remains on a therapy hold due to NWB. Next ortho appointment is 10/4/23    Allergies, and PMH/PSH reviewed in [facility] today.  REVIEW OF SYSTEMS:  Limited secondary to cognitive impairment but today pt reports 4 point ROS including Respiratory, CV, GI and , other than that noted in the HPI,  is negative    Objective:   /78   Pulse 62   Temp 97  F (36.1  C)   Resp 18   Ht 1.499 m (4' 11\")   Wt 84.1 kg (185 lb 6.4 oz)   SpO2 96%   BMI 37.45 kg/m    GENERAL APPEARANCE:  Alert, in no distress  RESP:  no respiratory distress  PSYCH:  insight and judgement impaired, memory impaired       Assessment/Plan:  (C79.722T) Fracture of ankle, trimalleolar, right, closed, sequela  (primary encounter diagnosis)  Comment: Patient is unable to maintain NWB due to dementia, therefore it was not safe to continue attempting therapy.   Plan: PT/OT eval and treat once WBAT, discharge planning per their recommendation. F/u ortho as scheduled.    (F03.C11) Severe dementia with agitation, unspecified dementia type (H)  Comment: Emotional distress and agitation has " improved, likely due to settling in here  Plan: Continue current POC with no changes at this time and adjustments as needed.    (E11.9) Type 2 diabetes mellitus without complication, without long-term current use of insulin (H)  Comment: Well controlled. Will reduce frequency of BG checks      Orders:  Change BGM to BID on M,W,F    Electronically signed by: MEET Mg CNP           Sincerely,        MEET Mg CNP

## 2023-10-12 ENCOUNTER — TRANSITIONAL CARE UNIT VISIT (OUTPATIENT)
Dept: GERIATRICS | Facility: CLINIC | Age: 78
End: 2023-10-12
Payer: COMMERCIAL

## 2023-10-12 DIAGNOSIS — S82.851S: Primary | ICD-10-CM

## 2023-10-12 PROCEDURE — 99207 PR NO BILLABLE SERVICE THIS VISIT: CPT | Performed by: INTERNAL MEDICINE

## 2023-10-12 NOTE — PROGRESS NOTES
[facility] GERIATRICS  REGULATORY VISIT  October 12, 2023      [facility] Medical Record Number:  [id number]  Place of Service where encounter took place:  [facility] ([facility]) [[id number]]    Chief Complaint   Patient presents with    senior living Regulatory       HPI:    Trinidad Brown is a 78 year old  (1945), who is being seen today for a federally mandated E/M visit at [facility] where she is doing rehab after a hospitalization with a fall and R ankle trimalleolar fracture s/p ORIF (8/7/23).    Today, Ms. Brown is seen in her room. She was sound asleep. Talked with nursing, no acute concerns today. No charge LOS to satisfy regulatory requirement of this visit.     ALLERGIES:  No Known Allergies     Past Medical, Surgical, Family and Social History: Reviewed and updated in EPIC.    MEDICATIONS:  Current Outpatient Medications   Medication Sig Dispense Refill    acetaminophen (TYLENOL) 325 MG tablet Take 2 tablets (650 mg) by mouth every 4 hours as needed for mild pain (Patient taking differently: Take 650 mg by mouth 3 times daily And q4h PRN) 60 tablet 0    aspirin (ASA) 325 MG EC tablet Take 1 tablet (325 mg) by mouth daily 30 tablet 0    atorvastatin (LIPITOR) 40 MG tablet [ATORVASTATIN (LIPITOR) 40 MG TABLET] Take 40 mg by mouth at bedtime.      citalopram (CELEXA) 10 MG tablet Take 10 mg by mouth daily      dulaglutide (TRULICITY) 0.75 MG/0.5ML pen Inject 0.75 mg Subcutaneous every 7 days      metFORMIN (GLUCOPHAGE) 1000 MG tablet [METFORMIN (GLUCOPHAGE) 1000 MG TABLET] Take 1,000 mg by mouth 2 (two) times a day with meals.      senna-docusate (SENOKOT-S/PERICOLACE) 8.6-50 MG tablet Take 2 tablets by mouth daily as needed for constipation       Medications reviewed:  Medications reconciled to facility chart and changes were made to reflect current medications as identified as above med list. Below are the changes that were made:   Medications stopped since last EPIC medication  "reconciliation:   Medications Discontinued During This Encounter   Medication Reason    oxyCODONE (ROXICODONE) 5 MG tablet Med Rec(No AVS / No eCancel)     Medications started since last [facility] medication reconciliation:  No orders of the defined types were placed in this encounter.      REVIEW OF SYSTEMS:  4 point ROS neg other than the symptoms noted above in the HPI.  Unable to be obtained - she was sound asleep    PHYSICAL EXAM:  /78   Pulse 62   Temp 97.4  F (36.3  C)   Resp 17   Ht 1.575 m (5' 2\")   Wt 80.6 kg (177 lb 12.8 oz)   SpO2 97%   BMI 32.52 kg/m    Gen: laying in bed sound asleep in no acute distress  Resp: breathing non labored, no tachypnea     LABS/IMAGING: Reviewed as per Epic and/or [facility]    ASSESSMENT / PLAN:    Right Ankle Fracture s/p ORIF (8/7/23)  Secondary to a fall  -- NWB  -- analgesia with APAP 650 mg TID and q4h PRN, oxycodone 2.5 mg q4h PRN   -- DVT ppx with  mg daily per ortho  -- PT/OT  -- follow up with ortho as scheduled      DM, Type II  Hgb A1c 6.1. Sugars 100s-110s (am and hs)  -- metformin 1000 mg BID  -- dulaglutide 0.75 mg subQ weekly      Dementia Without Behavioral Disturbance  Mild Major Recurrent Depression  BIMS 4/15  -- citalopram 10 mg daily  -- OT following    Fall  Physical Deconditioning  In setting of hospitalization and underlying medical conditions  -- ongoing PT/OT      Electronically signed by  Abeba Anderson MD          "

## 2023-10-12 NOTE — LETTER
10/12/2023        RE: Trinidad Brown  [address]  [place] [address]        [facility] GERIATRICS  REGULATORY VISIT  October 12, 2023      [facility] Medical Record Number:  [id number]  Place of Service where encounter took place:  [facility] ([facility]) [[id number]]    Chief Complaint   Patient presents with     snf Regulatory       HPI:    Trinidad Brown is a 78 year old  (1945), who is being seen today for a federally mandated E/M visit at [facility] where she is doing rehab after a hospitalization with a fall and R ankle trimalleolar fracture s/p ORIF (8/7/23).    Today, Ms. Brown is seen in her room. She was sound asleep. Talked with nursing, no acute concerns today. No charge LOS to satisfy regulatory requirement of this visit.     ALLERGIES:  No Known Allergies     Past Medical, Surgical, Family and Social History: Reviewed and updated in EPIC.    MEDICATIONS:  Current Outpatient Medications   Medication Sig Dispense Refill     acetaminophen (TYLENOL) 325 MG tablet Take 2 tablets (650 mg) by mouth every 4 hours as needed for mild pain (Patient taking differently: Take 650 mg by mouth 3 times daily And q4h PRN) 60 tablet 0     aspirin (ASA) 325 MG EC tablet Take 1 tablet (325 mg) by mouth daily 30 tablet 0     atorvastatin (LIPITOR) 40 MG tablet [ATORVASTATIN (LIPITOR) 40 MG TABLET] Take 40 mg by mouth at bedtime.       citalopram (CELEXA) 10 MG tablet Take 10 mg by mouth daily       dulaglutide (TRULICITY) 0.75 MG/0.5ML pen Inject 0.75 mg Subcutaneous every 7 days       metFORMIN (GLUCOPHAGE) 1000 MG tablet [METFORMIN (GLUCOPHAGE) 1000 MG TABLET] Take 1,000 mg by mouth 2 (two) times a day with meals.       senna-docusate (SENOKOT-S/PERICOLACE) 8.6-50 MG tablet Take 2 tablets by mouth daily as needed for constipation       Medications reviewed:  Medications reconciled to facility chart and changes were made to reflect current medications as identified as above med list.  "Below are the changes that were made:   Medications stopped since last EPIC medication reconciliation:   Medications Discontinued During This Encounter   Medication Reason     oxyCODONE (ROXICODONE) 5 MG tablet Med Rec(No AVS / No eCancel)     Medications started since last [facility] medication reconciliation:  No orders of the defined types were placed in this encounter.      REVIEW OF SYSTEMS:  4 point ROS neg other than the symptoms noted above in the HPI.  Unable to be obtained - she was sound asleep    PHYSICAL EXAM:  /78   Pulse 62   Temp 97.4  F (36.3  C)   Resp 17   Ht 1.575 m (5' 2\")   Wt 80.6 kg (177 lb 12.8 oz)   SpO2 97%   BMI 32.52 kg/m    Gen: laying in bed sound asleep in no acute distress  Resp: breathing non labored, no tachypnea     LABS/IMAGING: Reviewed as per Epic and/or [facility]    ASSESSMENT / PLAN:    Right Ankle Fracture s/p ORIF (8/7/23)  Secondary to a fall  -- NWB  -- analgesia with APAP 650 mg TID and q4h PRN, oxycodone 2.5 mg q4h PRN   -- DVT ppx with  mg daily per ortho  -- PT/OT  -- follow up with ortho as scheduled      DM, Type II  Hgb A1c 6.1. Sugars 100s-110s (am and hs)  -- metformin 1000 mg BID  -- dulaglutide 0.75 mg subQ weekly      Dementia Without Behavioral Disturbance  Mild Major Recurrent Depression  BIMS 4/15  -- citalopram 10 mg daily  -- OT following    Fall  Physical Deconditioning  In setting of hospitalization and underlying medical conditions  -- ongoing PT/OT      Electronically signed by  Abeba Anderson MD              Sincerely,        Abeba Anderson MD      "

## 2023-10-15 VITALS
TEMPERATURE: 97.4 F | SYSTOLIC BLOOD PRESSURE: 121 MMHG | HEIGHT: 62 IN | RESPIRATION RATE: 17 BRPM | DIASTOLIC BLOOD PRESSURE: 78 MMHG | WEIGHT: 177.8 LBS | HEART RATE: 62 BPM | OXYGEN SATURATION: 97 % | BODY MASS INDEX: 32.72 KG/M2

## 2023-10-18 ENCOUNTER — TRANSITIONAL CARE UNIT VISIT (OUTPATIENT)
Dept: GERIATRICS | Facility: CLINIC | Age: 78
End: 2023-10-18
Payer: COMMERCIAL

## 2023-10-18 DIAGNOSIS — G30.1 MODERATE LATE ONSET ALZHEIMER'S DEMENTIA WITH AGITATION (H): ICD-10-CM

## 2023-10-18 DIAGNOSIS — F02.B11 MODERATE LATE ONSET ALZHEIMER'S DEMENTIA WITH AGITATION (H): ICD-10-CM

## 2023-10-18 DIAGNOSIS — S82.841S CLOSED BIMALLEOLAR FRACTURE OF RIGHT ANKLE, SEQUELA: Primary | ICD-10-CM

## 2023-10-18 PROCEDURE — 99308 SBSQ NF CARE LOW MDM 20: CPT | Performed by: NURSE PRACTITIONER

## 2023-10-18 NOTE — PROGRESS NOTES
[facility] GERIATRICS    Chief Complaint   Patient presents with    RECHECK     HPI:  Trinidad Brown is a 78 year old  (1945), who is being seen today for an episodic care visit at: [facility] ([facility]) [[id number]]. [facility] stay 8/6/23 through 8/9/23. Patient with PMH dementia, DM2, and spinal stenosis presented after a fall at home in the shower. She was found to have a right trimalleolar ankle fracture. On 8/7/23 she underwent ORIF. Post-op course uncomplicated.     Today's concern is:   Patient was not able to maintain NWB due to dementia. She just recently saw ortho and is cleared to WBAT. Therapy has been trying to work with her, but she is not following directions well. Even with an , she does not understand. She actually is very resistant to putting her right foot on the ground now. They likely will need to end next week. Plan is to schedule caregiver training with her family for next week.     Allergies, and PMH/PSH reviewed in EPIC today.  REVIEW OF SYSTEMS:  Unobtainable secondary to cognitive impairment.     Objective:   There were no vitals taken for this visit.  GENERAL APPEARANCE:  Alert, in no distress  RESP:  no respiratory distress  PSYCH:  insight and judgement impaired, memory impaired       Assessment/Plan:  (A83.753T) Closed bimalleolar fracture of right ankle, sequela  (primary encounter diagnosis)  Comment: Patient initially was bearing weight on her leg quite often. It was too difficult for staff to prevent this due to her dementia. Despite this, it seems it has healed well. Unfortunately, she now seems fearful of bearing weight. This likely will improve, but possibly not until she gets home.   Plan: PT/OT eval and treat, discharge planning per their recommendations.    (G30.1,  F02.B11) Moderate late onset Alzheimer's dementia with agitation (H)  Comment: Chronic, progressive. Requires 24 hour skilled nursing care. HPI/ROS difficult to obtain  with cognitive impairment. Expect further functional and cognitive decline. Expect weight loss.  Plan: Continue 24 hour care. Monitor weight. Monitor functional status. Monitor for behavioral disturbances.      Electronically signed by: MEET Mg CNP

## 2023-10-18 NOTE — LETTER
10/18/2023        RE: Trinidad Brown  [address]  [place] [address]        [facility] GERIATRICS    Chief Complaint   Patient presents with     RECHECK     HPI:  Trinidad Brown is a 78 year old  (1945), who is being seen today for an episodic care visit at: [facility] ([facility]) [[id number]]. [facility] stay 8/6/23 through 8/9/23. Patient with PMH dementia, DM2, and spinal stenosis presented after a fall at home in the shower. She was found to have a right trimalleolar ankle fracture. On 8/7/23 she underwent ORIF. Post-op course uncomplicated.     Today's concern is:   Patient was not able to maintain NWB due to dementia. She just recently saw ortho and is cleared to WBAT. Therapy has been trying to work with her, but she is not following directions well. Even with an , she does not understand. She actually is very resistant to putting her right foot on the ground now. They likely will need to end next week. Plan is to schedule caregiver training with her family for next week.     Allergies, and PMH/PSH reviewed in EPIC today.  REVIEW OF SYSTEMS:  Unobtainable secondary to cognitive impairment.     Objective:   There were no vitals taken for this visit.  GENERAL APPEARANCE:  Alert, in no distress  RESP:  no respiratory distress  PSYCH:  insight and judgement impaired, memory impaired       Assessment/Plan:  (P07.739W) Closed bimalleolar fracture of right ankle, sequela  (primary encounter diagnosis)  Comment: Patient initially was bearing weight on her leg quite often. It was too difficult for staff to prevent this due to her dementia. Despite this, it seems it has healed well. Unfortunately, she now seems fearful of bearing weight. This likely will improve, but possibly not until she gets home.   Plan: PT/OT eval and treat, discharge planning per their recommendations.    (G30.1,  F02.B11) Moderate late onset Alzheimer's dementia with agitation (H)  Comment: Chronic,  progressive. Requires 24 hour skilled nursing care. HPI/ROS difficult to obtain with cognitive impairment. Expect further functional and cognitive decline. Expect weight loss.  Plan: Continue 24 hour care. Monitor weight. Monitor functional status. Monitor for behavioral disturbances.      Electronically signed by: MEET Mg CNP           Sincerely,        MEET Mg CNP

## 2023-10-25 ENCOUNTER — TRANSITIONAL CARE UNIT VISIT (OUTPATIENT)
Dept: GERIATRICS | Facility: CLINIC | Age: 78
End: 2023-10-25
Payer: COMMERCIAL

## 2023-10-25 DIAGNOSIS — G30.1 MODERATE LATE ONSET ALZHEIMER'S DEMENTIA WITH AGITATION (H): ICD-10-CM

## 2023-10-25 DIAGNOSIS — F02.B11 MODERATE LATE ONSET ALZHEIMER'S DEMENTIA WITH AGITATION (H): ICD-10-CM

## 2023-10-25 DIAGNOSIS — S82.841S CLOSED BIMALLEOLAR FRACTURE OF RIGHT ANKLE, SEQUELA: ICD-10-CM

## 2023-10-25 DIAGNOSIS — S82.843A CLOSED BIMALLEOLAR FRACTURE, UNSPECIFIED LATERALITY, INITIAL ENCOUNTER: ICD-10-CM

## 2023-10-25 DIAGNOSIS — E11.649 TYPE 2 DIABETES MELLITUS WITH HYPOGLYCEMIA WITHOUT COMA, WITHOUT LONG-TERM CURRENT USE OF INSULIN (H): Primary | ICD-10-CM

## 2023-10-25 PROCEDURE — 99309 SBSQ NF CARE MODERATE MDM 30: CPT | Performed by: NURSE PRACTITIONER

## 2023-10-25 RX ORDER — ACETAMINOPHEN 325 MG/1
650 TABLET ORAL 3 TIMES DAILY
Start: 2023-10-25

## 2023-10-25 NOTE — PROGRESS NOTES
[facility] GERIATRICS    Chief Complaint   Patient presents with    RECHECK     HPI:  Trinidad Brown is a 78 year old  (1945), who is being seen today for an episodic care visit at: [facility] ([facility]) [[id number]].     HPI obtained from review of nursing home record, discussion with facility staff, and Epic review. Patient does not communicate using video .     Today's concern is:   Therapy has had a difficult time getting patient to work with them. She will not follow instructions from the video . The  who speaks Hmong has gotten her to do some activity. She is now trying to coordinate with therapy so she can help them. Therapy has tried contacting family for caregiver training, but they are not returning phone calls.   -130s/60-70s  HR 70-80s  BG 90s-100s, occasional 80s. Staff report poor intake    Allergies, and PMH/PSH reviewed in EPIC today.  REVIEW OF SYSTEMS:  Unobtainable secondary to cognitive impairment.     Objective:   There were no vitals taken for this visit.  GENERAL APPEARANCE:  Alert, in no distress  EYES:  EOM normal, conjunctiva and lids normal  PSYCH:  not cooperative with , mood and affect flat      Assessment/Plan:  (E11.649) Type 2 diabetes mellitus with hypoglycemia without coma, without long-term current use of insulin (H)  (primary encounter diagnosis)  Comment: Patient with consistent hypoglycemia, likely due to poor intake. Will stop Trulicity as this can certainly reduce appetite. Will also decrease metformin for now. If her appetite improves and sugars start rising, will increase metformin back    (S82.1S) Closed bimalleolar fracture of right ankle, sequela  Comment: Patient initially was getting up on her own frequently, bearing weight when she was not supposed to. Now staff struggle to get her out of bed or out of the wheelchair. She may have some fear of fall or some lingering pain. Hopefully this will improve with  time  Plan: PT/OT eval and treat, discharge planning per their recommendations.    (G30.1,  F02.B11) Moderate late onset Alzheimer's dementia with agitation (H)  Comment: Patient requires a high level of care and assistance. It would be reasonable if family has decided that they can no longer care for her. Social work will reach out periodically. Patient can move into LTC if/when needed  Plan: Continue current POC with no changes at this time and adjustments as needed.      Orders:  Discontinue Trulicity  Decrease metformin to 500mg BID    Electronically signed by: MEET Mg CNP

## 2023-10-25 NOTE — LETTER
10/25/2023        RE: Trniidad Brown  [address]  [place] [address]        [facility] GERIATRICS    Chief Complaint   Patient presents with     RECHECK     HPI:  Trinidad Brown is a 78 year old  (1945), who is being seen today for an episodic care visit at: [facility] ([facility]) [[id number]].     HPI obtained from review of nursing home record, discussion with facility staff, and Epic review. Patient does not communicate using video .     Today's concern is:   Therapy has had a difficult time getting patient to work with them. She will not follow instructions from the video . The  who speaks Hmong has gotten her to do some activity. She is now trying to coordinate with therapy so she can help them. Therapy has tried contacting family for caregiver training, but they are not returning phone calls.   -130s/60-70s  HR 70-80s  BG 90s-100s, occasional 80s. Staff report poor intake    Allergies, and PMH/PSH reviewed in EPIC today.  REVIEW OF SYSTEMS:  Unobtainable secondary to cognitive impairment.     Objective:   There were no vitals taken for this visit.  GENERAL APPEARANCE:  Alert, in no distress  EYES:  EOM normal, conjunctiva and lids normal  PSYCH:  not cooperative with , mood and affect flat      Assessment/Plan:  (E11.649) Type 2 diabetes mellitus with hypoglycemia without coma, without long-term current use of insulin (H)  (primary encounter diagnosis)  Comment: Patient with consistent hypoglycemia, likely due to poor intake. Will stop Trulicity as this can certainly reduce appetite. Will also decrease metformin for now. If her appetite improves and sugars start rising, will increase metformin back    (S82.841S) Closed bimalleolar fracture of right ankle, sequela  Comment: Patient initially was getting up on her own frequently, bearing weight when she was not supposed to. Now staff struggle to get her out of bed or out of the wheelchair. She may have  some fear of fall or some lingering pain. Hopefully this will improve with time  Plan: PT/OT eval and treat, discharge planning per their recommendations.    (G30.1,  F02.B11) Moderate late onset Alzheimer's dementia with agitation (H)  Comment: Patient requires a high level of care and assistance. It would be reasonable if family has decided that they can no longer care for her. Social work will reach out periodically. Patient can move into LTC if/when needed  Plan: Continue current POC with no changes at this time and adjustments as needed.      Orders:  Discontinue Trulicity  Decrease metformin to 500mg BID    Electronically signed by: MEET Mg CNP           Sincerely,        MEET Mg CNP

## 2023-11-01 ENCOUNTER — TRANSITIONAL CARE UNIT VISIT (OUTPATIENT)
Dept: GERIATRICS | Facility: CLINIC | Age: 78
End: 2023-11-01
Payer: COMMERCIAL

## 2023-11-01 VITALS
DIASTOLIC BLOOD PRESSURE: 78 MMHG | SYSTOLIC BLOOD PRESSURE: 121 MMHG | RESPIRATION RATE: 17 BRPM | HEART RATE: 62 BPM | TEMPERATURE: 97.4 F | OXYGEN SATURATION: 97 %

## 2023-11-01 DIAGNOSIS — G30.1 MODERATE LATE ONSET ALZHEIMER'S DEMENTIA WITHOUT BEHAVIORAL DISTURBANCE, PSYCHOTIC DISTURBANCE, MOOD DISTURBANCE, OR ANXIETY (H): ICD-10-CM

## 2023-11-01 DIAGNOSIS — F02.B0 MODERATE LATE ONSET ALZHEIMER'S DEMENTIA WITHOUT BEHAVIORAL DISTURBANCE, PSYCHOTIC DISTURBANCE, MOOD DISTURBANCE, OR ANXIETY (H): ICD-10-CM

## 2023-11-01 DIAGNOSIS — E11.649 TYPE 2 DIABETES MELLITUS WITH HYPOGLYCEMIA WITHOUT COMA, WITHOUT LONG-TERM CURRENT USE OF INSULIN (H): ICD-10-CM

## 2023-11-01 DIAGNOSIS — S82.841S CLOSED BIMALLEOLAR FRACTURE OF RIGHT ANKLE, SEQUELA: Primary | ICD-10-CM

## 2023-11-01 PROCEDURE — 99309 SBSQ NF CARE MODERATE MDM 30: CPT | Performed by: NURSE PRACTITIONER

## 2023-11-01 NOTE — PROGRESS NOTES
[facility] GERIATRICS    Chief Complaint   Patient presents with    RECHECK     HPI:  Trinidad Brown is a 78 year old  (1945), who is being seen today for an episodic care visit at: [facility] ([facility]) [[id number]].     Today's concern is:   Family did attend caregiver training and patient has been more cooperative with them present. They are planning to take her home. Last day of therapy will be 11/6/23. She is max assist for ADLs, walked 10 feet with a walker. On 10/25/23 order was given to stop Trulicity and decrease metformin. BG remain 80s-120s.     Allergies, and PMH/PSH reviewed in EPIC today.  REVIEW OF SYSTEMS:  Unobtainable secondary to cognitive impairment.     Objective:   /78   Pulse 62   Temp 97.4  F (36.3  C)   Resp 17   SpO2 97%   GENERAL APPEARANCE:  Alert, in no distress  RESP:  no respiratory distress      Assessment/Plan:  (S82.991S) Closed bimalleolar fracture of right ankle, sequela  (primary encounter diagnosis)  Comment: Therapy has been challenging due to dementia. Deconditioning is severe due to prolonged time of inactivity. Family can certainly continue to work on exercises at home if she is cooperative. Expect that home PT/OT will be ordered as well.   Plan: PT/OT eval and treat, discharge planning per their recommendation. No s/sx DVT, will stop ASA now.     (E11.649) Type 2 diabetes mellitus with hypoglycemia without coma, without long-term current use of insulin (H)  Comment: Patient could still be experiencing the effects of Trulicity. Would not recommend restarting this due to poor appetite. Will leave metformin in place as this has minimal contribution to hypoglycemia. She hopefully will have improved intake at home  Plan: Continue current POC with no changes at this time and adjustments as needed.    (G30.1,  F02.B0) Moderate late onset Alzheimer's dementia without behavioral disturbance, psychotic disturbance, mood disturbance, or anxiety (H)  Comment: Severe,  chronic, progressive. Requires 24 hour supervision      Orders:  Discontinue ASA  Discontinue imodium and senna (not used in the past month)      Electronically signed by: MEET Mg CNP

## 2023-11-01 NOTE — LETTER
11/1/2023        RE: Trinidad Brown  [address]  [place] [address]        [facility] GERIATRICS    Chief Complaint   Patient presents with     RECHECK     HPI:  Trinidad Brown is a 78 year old  (1945), who is being seen today for an episodic care visit at: [facility] ([facility]) [[id number]].     Today's concern is:   Family did attend caregiver training and patient has been more cooperative with them present. They are planning to take her home. Last day of therapy will be 11/6/23. She is max assist for ADLs, walked 10 feet with a walker. On 10/25/23 order was given to stop Trulicity and decrease metformin. BG remain 80s-120s.     Allergies, and PMH/PSH reviewed in [facility] today.  REVIEW OF SYSTEMS:  Unobtainable secondary to cognitive impairment.     Objective:   /78   Pulse 62   Temp 97.4  F (36.3  C)   Resp 17   SpO2 97%   GENERAL APPEARANCE:  Alert, in no distress  RESP:  no respiratory distress      Assessment/Plan:  (S82.841S) Closed bimalleolar fracture of right ankle, sequela  (primary encounter diagnosis)  Comment: Therapy has been challenging due to dementia. Deconditioning is severe due to prolonged time of inactivity. Family can certainly continue to work on exercises at home if she is cooperative. Expect that home PT/OT will be ordered as well.   Plan: PT/OT eval and treat, discharge planning per their recommendation. No s/sx DVT, will stop ASA now.     (E11.649) Type 2 diabetes mellitus with hypoglycemia without coma, without long-term current use of insulin (H)  Comment: Patient could still be experiencing the effects of Trulicity. Would not recommend restarting this due to poor appetite. Will leave metformin in place as this has minimal contribution to hypoglycemia. She hopefully will have improved intake at home  Plan: Continue current POC with no changes at this time and adjustments as needed.    (G30.1,  F02.B0) Moderate late onset Alzheimer's dementia without behavioral  disturbance, psychotic disturbance, mood disturbance, or anxiety (H)  Comment: Severe, chronic, progressive. Requires 24 hour supervision      Orders:  Discontinue ASA  Discontinue imodium and senna (not used in the past month)      Electronically signed by: MEET Mg CNP           Sincerely,        MEET Mg CNP

## 2023-11-06 ENCOUNTER — DISCHARGE SUMMARY NURSING HOME (OUTPATIENT)
Dept: GERIATRICS | Facility: CLINIC | Age: 78
End: 2023-11-06
Payer: COMMERCIAL

## 2023-11-06 DIAGNOSIS — G30.1 MODERATE LATE ONSET ALZHEIMER'S DEMENTIA WITH OTHER BEHAVIORAL DISTURBANCE (H): ICD-10-CM

## 2023-11-06 DIAGNOSIS — E11.649 TYPE 2 DIABETES MELLITUS WITH HYPOGLYCEMIA WITHOUT COMA, WITHOUT LONG-TERM CURRENT USE OF INSULIN (H): ICD-10-CM

## 2023-11-06 DIAGNOSIS — F02.B18 MODERATE LATE ONSET ALZHEIMER'S DEMENTIA WITH OTHER BEHAVIORAL DISTURBANCE (H): ICD-10-CM

## 2023-11-06 DIAGNOSIS — S82.841S CLOSED BIMALLEOLAR FRACTURE OF RIGHT ANKLE, SEQUELA: Primary | ICD-10-CM

## 2023-11-06 PROCEDURE — 99315 NF DSCHRG MGMT 30 MIN/LESS: CPT | Performed by: NURSE PRACTITIONER

## 2023-11-06 NOTE — LETTER
11/6/2023        RE: Trinidad Brown  [address]  [place] [address]        [facility] GERIATRICS DISCHARGE SUMMARY  PATIENT'S NAME: Trinidad Brown  YOB: 1945  MEDICAL RECORD NUMBER:  [id number]  Place of Service where encounter took place:  [facility] ([facility]) [[id number]]    PRIMARY CARE PROVIDER AND CLINIC RESPONSIBLE AFTER TRANSFER:   Sarah Quijano MD, [facility] [address] / [place] [address]    Non-[facility] Provider     Transferring providers: MEET Mg CNP, bAeba Anderson MD  Recent Hospitalization/ED:  Hospital  [facility] stay 8/6/23 to 8/9/23.  Date of SNF Admission: August 09, 2023  Date of SNF (anticipated) Discharge: November 07, 2023  Discharged to: with family   Cognitive Scores: Unable to test due to cooperation      CODE STATUS/ADVANCE DIRECTIVES DISCUSSION:  Full Code   ALLERGIES: Patient has no known allergies.    NURSING FACILITY COURSE   Medication Changes/Rationale:   Trulicity stopped due to poor appetite and hypoglycemia  Metformin reduced    Summary of nursing facility stay:   Trinidad Brown  is a 78 year old  (1945), admitted to the above facility from  [facility]. Hospital stay 8/6/23 through 8/9/23. Patient with PMH dementia, DM2, and spinal stenosis presented after a fall at home in the shower. She was found to have a right trimalleolar ankle fracture. On 8/7/23 she underwent ORIF. Post-op course uncomplicated.     Closed bimalleolar fracture of right ankle, sequela  Rehab has been very challenging due to dementia. Patient was not able to maintain NWB, therefore therapy had to place her on a hold until she received WBAT orders. During her time of NWB, she would frequently get up on her own, had a few falls. Once she was cleared to bear weight, therapy could not persuade her to cooperate. She would not get up at all for some time. She does not work well with the video . She would only  cooperate if her family or the Hmong-speaking  could be there to encourage her, and even then it was limited. Family has worked with therapy on caregiver training. Patient is max assist for ADLs. She can only walk a few feet.     Type 2 diabetes mellitus with hypoglycemia without coma, without long-term current use of insulin (H)  Appetite/intake has been very poor for weeks. Trulicity was stopped and metformin reduced on 10/25/23. BG remain well controlled. Her intake could improve once she gets home, recommend f/up with PCP as needed    Moderate late onset Alzheimer's dementia with other behavioral disturbance (H)  Patient is rarely cooperative with staff. For the first few weeks, she was even physically aggressive with them. This has improved now, but she continues to refuse most cares and therapy      Discharge Medications:  Current Outpatient Medications   Medication Sig Dispense Refill     acetaminophen (TYLENOL) 325 MG tablet Take 2 tablets (650 mg) by mouth 3 times daily And q4h PRN       citalopram (CELEXA) 10 MG tablet Take 10 mg by mouth daily       metFORMIN (GLUCOPHAGE) 500 MG tablet Take 1 tablet (500 mg) by mouth 2 times daily (with meals)          Controlled medications:   not applicable/none     Past Medical History:   Past Medical History:   Diagnosis Date     Alzheimer's dementia (H) 08/06/2023     Spinal stenosis 03/25/2019     Type 2 diabetes mellitus with hyperglycemia (H) 12/09/2015     Physical Exam:   GENERAL APPEARANCE:  Alert, in no distress  ENT:  Mouth and posterior oropharynx normal, moist mucous membranes  EYES:  EOM normal, conjunctiva and lids normal  RESP:  no respiratory distress     SNF labs: Labs done in SNF are in [place] EPIC. Please refer to them using EPIC/Care Everywhere.    DISCHARGE PLAN:  Follow up labs: No labs orders/due  Medical Follow Up:      Follow up with primary care provider in 2-3 weeks  Discharge Services: Patient/family deny home care  services      TOTAL DISCHARGE TIME:   Less than or equal to 30 minutes  Electronically signed by:  MEET gM CNP                     Sincerely,        MEET Mg CNP

## (undated) DEVICE — DRSG GAUZE 4X4" TRAY

## (undated) DEVICE — DRILL BIT ARTHREX 2.5MM AR-8943-30

## (undated) DEVICE — GLOVE BIOGEL INDICATOR 7.5 LF 41675

## (undated) DEVICE — DRAPE C-ARM 60X42" 1013

## (undated) DEVICE — SU VICRYL 3-0 CT-2 27" UND J232H

## (undated) DEVICE — SOLIDIFIER FLD 3.2OZ  CL-FREE F/ BIOHZRD WASTE 3000ML CANIST

## (undated) DEVICE — GUIDE WIRE TROCAR TIP 1.35MM AR-8943-01

## (undated) DEVICE — PLATE GROUNDING ADULT W/CORD 9165L

## (undated) DEVICE — ESU PENCIL SMOKE EVAC W/ROCKER SWITCH 0703-047-000

## (undated) DEVICE — ALCOHOL ISOPROPYL 4 OZ 70% IA7004

## (undated) DEVICE — CAST PADDING 3" STERILE 9043S

## (undated) DEVICE — BANDAGE ELASTIC 4X550 LF DBL 593-94LF

## (undated) DEVICE — GLOVE UNDER INDICATOR PI SZ 6.5 LF 41665

## (undated) DEVICE — Device

## (undated) DEVICE — GOWN LG DISP 9515

## (undated) DEVICE — SUTURE VICRYL+ 2-0 CT-2 27" UND VCP269H

## (undated) DEVICE — GLOVE SURGEON PI ORTHO SZ 7 LF

## (undated) DEVICE — SOL NACL 0.9% IRRIG 1000ML BOTTLE 2F7124

## (undated) DEVICE — DRAPE STOCKINETTE IMPERVIOUS 12" 1587

## (undated) DEVICE — DRILL BIT ARTHREX 2.5MM AR-8943-42

## (undated) DEVICE — CUSTOM PACK LOWER EXTREMITY SOP5BLEHEA

## (undated) DEVICE — DRSG ADAPTIC 3X3" 6112

## (undated) DEVICE — SUCTION MANIFOLD NEPTUNE 2 SYS 1 PORT 702-025-000

## (undated) DEVICE — PREP CHLORAPREP 26ML TINTED HI-LITE ORANGE 930815

## (undated) DEVICE — DRSG ABD TNDRSRB WET PRUF 8IN X 10IN STRL  9194A

## (undated) DEVICE — SOL WATER IRRIG 1000ML BOTTLE 2F7114

## (undated) DEVICE — GLOVE BIOGEL PI ULTRATOUCH G SZ 6.0 42160

## (undated) DEVICE — DRSG ADAPTIC 3X8" 6113

## (undated) DEVICE — SU MONOCRYL 3-0 PS-2 18" UND MCP497G

## (undated) DEVICE — SU ETHILON 3-0 PS-1 18" 1663G

## (undated) DEVICE — DRILL BIT ARTHREX LPS CAN 3.5MM AR-4160-35

## (undated) DEVICE — IMPLANTABLE DEVICE: Type: IMPLANTABLE DEVICE | Site: ANKLE | Status: NON-FUNCTIONAL

## (undated) RX ORDER — PROPOFOL 10 MG/ML
INJECTION, EMULSION INTRAVENOUS
Status: DISPENSED
Start: 2023-08-07

## (undated) RX ORDER — FENTANYL CITRATE 50 UG/ML
INJECTION, SOLUTION INTRAMUSCULAR; INTRAVENOUS
Status: DISPENSED
Start: 2023-08-07